# Patient Record
Sex: MALE | Race: WHITE | Employment: OTHER | URBAN - NONMETROPOLITAN AREA
[De-identification: names, ages, dates, MRNs, and addresses within clinical notes are randomized per-mention and may not be internally consistent; named-entity substitution may affect disease eponyms.]

---

## 2017-02-13 RX ORDER — ERGOCALCIFEROL 1.25 MG/1
CAPSULE ORAL
Qty: 4 CAPSULE | Refills: 5 | Status: SHIPPED | OUTPATIENT
Start: 2017-02-13 | End: 2017-08-29 | Stop reason: SDUPTHER

## 2017-02-20 ENCOUNTER — OFFICE VISIT (OUTPATIENT)
Dept: FAMILY MEDICINE CLINIC | Age: 57
End: 2017-02-20
Payer: MEDICAID

## 2017-02-20 ENCOUNTER — HOSPITAL ENCOUNTER (OUTPATIENT)
Dept: OTHER | Age: 57
Discharge: HOME | End: 2017-02-20
Attending: NURSE PRACTITIONER | Admitting: NURSE PRACTITIONER

## 2017-02-20 VITALS
BODY MASS INDEX: 33.41 KG/M2 | HEIGHT: 69 IN | OXYGEN SATURATION: 97 % | RESPIRATION RATE: 18 BRPM | DIASTOLIC BLOOD PRESSURE: 70 MMHG | HEART RATE: 88 BPM | WEIGHT: 225.6 LBS | SYSTOLIC BLOOD PRESSURE: 126 MMHG

## 2017-02-20 DIAGNOSIS — I10 ESSENTIAL HYPERTENSION: ICD-10-CM

## 2017-02-20 DIAGNOSIS — E78.2 MIXED HYPERLIPIDEMIA: ICD-10-CM

## 2017-02-20 DIAGNOSIS — E78.2 MIXED HYPERLIPIDEMIA: Primary | ICD-10-CM

## 2017-02-20 DIAGNOSIS — G62.9 PERIPHERAL POLYNEUROPATHY: ICD-10-CM

## 2017-02-20 DIAGNOSIS — F41.9 ANXIETY: ICD-10-CM

## 2017-02-20 PROCEDURE — 99214 OFFICE O/P EST MOD 30 MIN: CPT | Performed by: NURSE PRACTITIONER

## 2017-02-20 RX ORDER — LORAZEPAM 0.5 MG/1
0.5 TABLET ORAL EVERY 8 HOURS PRN
Qty: 30 TABLET | Refills: 0 | Status: SHIPPED | OUTPATIENT
Start: 2017-02-20 | End: 2017-03-27 | Stop reason: SDUPTHER

## 2017-02-20 RX ORDER — ROPINIROLE 2 MG/1
2 TABLET, FILM COATED ORAL NIGHTLY
Qty: 30 TABLET | Refills: 5 | Status: SHIPPED | OUTPATIENT
Start: 2017-02-20 | End: 2017-08-29 | Stop reason: SDUPTHER

## 2017-02-20 RX ORDER — LISINOPRIL 10 MG/1
10 TABLET ORAL DAILY
Qty: 30 TABLET | Refills: 5 | Status: SHIPPED | OUTPATIENT
Start: 2017-02-20 | End: 2017-04-24

## 2017-02-20 RX ORDER — CEPHALEXIN 500 MG/1
500 CAPSULE ORAL 3 TIMES DAILY
Qty: 30 CAPSULE | Refills: 0 | Status: CANCELLED | OUTPATIENT
Start: 2017-02-20

## 2017-02-20 RX ORDER — ESCITALOPRAM OXALATE 10 MG/1
10 TABLET ORAL DAILY
Qty: 30 TABLET | Refills: 5 | Status: SHIPPED | OUTPATIENT
Start: 2017-02-20 | End: 2017-08-29 | Stop reason: SDUPTHER

## 2017-02-20 RX ORDER — FLUTICASONE PROPIONATE 50 MCG
2 SPRAY, SUSPENSION (ML) NASAL NIGHTLY PRN
Qty: 1 BOTTLE | Refills: 5 | Status: SHIPPED | OUTPATIENT
Start: 2017-02-20 | End: 2017-08-29 | Stop reason: SDUPTHER

## 2017-02-20 RX ORDER — ASPIRIN 81 MG/1
81 TABLET, CHEWABLE ORAL DAILY
Qty: 30 TABLET | Refills: 11 | COMMUNITY
Start: 2017-02-20 | End: 2018-11-26 | Stop reason: SDUPTHER

## 2017-02-20 RX ORDER — SIMVASTATIN 10 MG
TABLET ORAL
Qty: 30 TABLET | Refills: 5 | Status: SHIPPED | OUTPATIENT
Start: 2017-02-20 | End: 2017-08-29 | Stop reason: SDUPTHER

## 2017-02-20 RX ORDER — LISINOPRIL 10 MG/1
1 TABLET ORAL DAILY
Refills: 0 | COMMUNITY
Start: 2017-01-24 | End: 2017-02-20 | Stop reason: SDUPTHER

## 2017-02-21 LAB
A/G RATIO: 1.7 (ref 0.8–2)
ALBUMIN SERPL-MCNC: 4.3 G/DL (ref 3.4–4.8)
ALP BLD-CCNC: 118 U/L (ref 25–100)
ALT SERPL-CCNC: 39 U/L (ref 4–36)
ANION GAP SERPL CALCULATED.3IONS-SCNC: 12 MMOL/L (ref 3–16)
AST SERPL-CCNC: 29 U/L (ref 8–33)
BILIRUB SERPL-MCNC: 0.3 MG/DL (ref 0.3–1.2)
BUN BLDV-MCNC: 10 MG/DL (ref 6–20)
CALCIUM SERPL-MCNC: 9.8 MG/DL (ref 8.5–10.5)
CHLORIDE BLD-SCNC: 102 MMOL/L (ref 98–107)
CHOLESTEROL, TOTAL: 168 MG/DL (ref 0–200)
CO2: 29 MMOL/L (ref 20–30)
CREAT SERPL-MCNC: 0.8 MG/DL (ref 0.4–1.2)
GFR AFRICAN AMERICAN: >59
GFR NON-AFRICAN AMERICAN: >59
GLOBULIN: 2.5 G/DL
GLUCOSE BLD-MCNC: 98 MG/DL (ref 74–106)
HDLC SERPL-MCNC: 56 MG/DL (ref 40–60)
LDL CHOLESTEROL CALCULATED: 86 MG/DL
POTASSIUM SERPL-SCNC: 5.1 MMOL/L (ref 3.4–5.1)
SODIUM BLD-SCNC: 143 MMOL/L (ref 136–145)
TOTAL PROTEIN: 6.8 G/DL (ref 6.4–8.3)
TRIGL SERPL-MCNC: 130 MG/DL (ref 0–249)
VLDLC SERPL CALC-MCNC: 26 MG/DL

## 2017-02-27 ASSESSMENT — ENCOUNTER SYMPTOMS
RESPIRATORY NEGATIVE: 1
EYES NEGATIVE: 1
HEARTBURN: 1
BACK PAIN: 1

## 2017-03-28 RX ORDER — LORAZEPAM 0.5 MG/1
TABLET ORAL
Qty: 30 TABLET | Refills: 0 | Status: SHIPPED | OUTPATIENT
Start: 2017-03-28 | End: 2017-04-27 | Stop reason: SDUPTHER

## 2017-04-03 ENCOUNTER — TELEPHONE (OUTPATIENT)
Dept: FAMILY MEDICINE CLINIC | Age: 57
End: 2017-04-03

## 2017-04-17 DIAGNOSIS — R19.5 POSITIVE FIT (FECAL IMMUNOCHEMICAL TEST): Primary | ICD-10-CM

## 2017-04-17 DIAGNOSIS — Z12.11 SCREENING FOR COLON CANCER: Primary | ICD-10-CM

## 2017-04-17 LAB
CONTROL: ABNORMAL
HEMOCCULT STL QL: ABNORMAL

## 2017-04-17 PROCEDURE — 82274 ASSAY TEST FOR BLOOD FECAL: CPT | Performed by: NURSE PRACTITIONER

## 2017-04-24 ENCOUNTER — OFFICE VISIT (OUTPATIENT)
Dept: SURGERY | Age: 57
End: 2017-04-24
Payer: MEDICAID

## 2017-04-24 ENCOUNTER — SURG/PROC ORDERS (OUTPATIENT)
Dept: SURGERY | Age: 57
End: 2017-04-24

## 2017-04-24 ENCOUNTER — HOSPITAL ENCOUNTER (OUTPATIENT)
Dept: OTHER | Age: 57
Discharge: HOME | End: 2017-04-24
Attending: SURGERY | Admitting: SURGERY

## 2017-04-24 VITALS
HEIGHT: 69 IN | HEART RATE: 90 BPM | WEIGHT: 230.9 LBS | BODY MASS INDEX: 34.2 KG/M2 | TEMPERATURE: 98.7 F | DIASTOLIC BLOOD PRESSURE: 89 MMHG | SYSTOLIC BLOOD PRESSURE: 154 MMHG

## 2017-04-24 DIAGNOSIS — R19.5 HEME POSITIVE STOOL: Primary | ICD-10-CM

## 2017-04-24 PROCEDURE — 99244 OFF/OP CNSLTJ NEW/EST MOD 40: CPT | Performed by: SURGERY

## 2017-04-24 RX ORDER — SODIUM CHLORIDE 0.9 % (FLUSH) 0.9 %
10 SYRINGE (ML) INJECTION PRN
Status: CANCELLED | OUTPATIENT
Start: 2017-04-24

## 2017-04-24 RX ORDER — SODIUM CHLORIDE 0.9 % (FLUSH) 0.9 %
10 SYRINGE (ML) INJECTION EVERY 12 HOURS SCHEDULED
Status: CANCELLED | OUTPATIENT
Start: 2017-04-24

## 2017-04-24 RX ORDER — SODIUM CHLORIDE, SODIUM LACTATE, POTASSIUM CHLORIDE, CALCIUM CHLORIDE 600; 310; 30; 20 MG/100ML; MG/100ML; MG/100ML; MG/100ML
INJECTION, SOLUTION INTRAVENOUS CONTINUOUS
Status: CANCELLED | OUTPATIENT
Start: 2017-04-24

## 2017-04-24 ASSESSMENT — ENCOUNTER SYMPTOMS
RESPIRATORY NEGATIVE: 1
GASTROINTESTINAL NEGATIVE: 1
EYES NEGATIVE: 1

## 2017-04-27 RX ORDER — LORAZEPAM 0.5 MG/1
TABLET ORAL
Qty: 30 TABLET | Refills: 0 | Status: SHIPPED | OUTPATIENT
Start: 2017-04-27 | End: 2017-05-29 | Stop reason: SDUPTHER

## 2017-05-26 RX ORDER — PANTOPRAZOLE SODIUM 40 MG/1
TABLET, DELAYED RELEASE ORAL
Qty: 30 TABLET | Refills: 5 | Status: SHIPPED | OUTPATIENT
Start: 2017-05-26 | End: 2017-11-24 | Stop reason: SDUPTHER

## 2017-05-30 RX ORDER — PANTOPRAZOLE SODIUM 40 MG/1
TABLET, DELAYED RELEASE ORAL
Qty: 30 TABLET | Refills: 5 | OUTPATIENT
Start: 2017-05-30

## 2017-05-30 RX ORDER — LORAZEPAM 0.5 MG/1
TABLET ORAL
Qty: 30 TABLET | Refills: 0 | Status: SHIPPED | OUTPATIENT
Start: 2017-05-30 | End: 2017-06-29 | Stop reason: SDUPTHER

## 2017-06-29 RX ORDER — LORAZEPAM 0.5 MG/1
TABLET ORAL
Qty: 30 TABLET | Refills: 0 | Status: SHIPPED | OUTPATIENT
Start: 2017-06-29 | End: 2017-08-29 | Stop reason: SDUPTHER

## 2017-08-29 ENCOUNTER — OFFICE VISIT (OUTPATIENT)
Dept: FAMILY MEDICINE CLINIC | Age: 57
End: 2017-08-29
Payer: MEDICAID

## 2017-08-29 ENCOUNTER — HOSPITAL ENCOUNTER (OUTPATIENT)
Dept: OTHER | Age: 57
Discharge: HOME | End: 2017-08-29
Attending: NURSE PRACTITIONER | Admitting: NURSE PRACTITIONER

## 2017-08-29 VITALS
HEIGHT: 69 IN | OXYGEN SATURATION: 98 % | BODY MASS INDEX: 35.58 KG/M2 | SYSTOLIC BLOOD PRESSURE: 100 MMHG | HEART RATE: 83 BPM | DIASTOLIC BLOOD PRESSURE: 70 MMHG | WEIGHT: 240.2 LBS | RESPIRATION RATE: 12 BRPM

## 2017-08-29 DIAGNOSIS — E78.2 MIXED HYPERLIPIDEMIA: ICD-10-CM

## 2017-08-29 DIAGNOSIS — M25.50 POLYARTHRALGIA: ICD-10-CM

## 2017-08-29 DIAGNOSIS — Z23 NEED FOR INFLUENZA VACCINATION: ICD-10-CM

## 2017-08-29 DIAGNOSIS — E78.2 MIXED HYPERLIPIDEMIA: Primary | ICD-10-CM

## 2017-08-29 DIAGNOSIS — R60.9 EDEMA, UNSPECIFIED TYPE: ICD-10-CM

## 2017-08-29 DIAGNOSIS — G62.9 PERIPHERAL POLYNEUROPATHY: ICD-10-CM

## 2017-08-29 LAB
A/G RATIO: 1.8 (ref 0.8–2)
ALBUMIN SERPL-MCNC: 4.4 G/DL (ref 3.4–4.8)
ALP BLD-CCNC: 141 U/L (ref 25–100)
ALT SERPL-CCNC: 48 U/L (ref 4–36)
ANION GAP SERPL CALCULATED.3IONS-SCNC: 10 MMOL/L (ref 3–16)
AST SERPL-CCNC: 27 U/L (ref 8–33)
BILIRUB SERPL-MCNC: 0.4 MG/DL (ref 0.3–1.2)
BUN BLDV-MCNC: 21 MG/DL (ref 6–20)
CALCIUM SERPL-MCNC: 9.7 MG/DL (ref 8.5–10.5)
CHLORIDE BLD-SCNC: 103 MMOL/L (ref 98–107)
CHOLESTEROL, TOTAL: 174 MG/DL (ref 0–200)
CO2: 27 MMOL/L (ref 20–30)
CREAT SERPL-MCNC: 0.9 MG/DL (ref 0.4–1.2)
GFR AFRICAN AMERICAN: >59
GFR NON-AFRICAN AMERICAN: >59
GLOBULIN: 2.4 G/DL
GLUCOSE BLD-MCNC: 106 MG/DL (ref 74–106)
HDLC SERPL-MCNC: 48 MG/DL (ref 40–60)
LDL CHOLESTEROL CALCULATED: 86 MG/DL
POTASSIUM SERPL-SCNC: 5.1 MMOL/L (ref 3.4–5.1)
SODIUM BLD-SCNC: 140 MMOL/L (ref 136–145)
TOTAL PROTEIN: 6.8 G/DL (ref 6.4–8.3)
TRIGL SERPL-MCNC: 201 MG/DL (ref 0–249)
TSH SERPL DL<=0.05 MIU/L-ACNC: 1.35 UIU/ML (ref 0.35–5.5)
VLDLC SERPL CALC-MCNC: 40 MG/DL

## 2017-08-29 PROCEDURE — 99214 OFFICE O/P EST MOD 30 MIN: CPT | Performed by: NURSE PRACTITIONER

## 2017-08-29 PROCEDURE — 90471 IMMUNIZATION ADMIN: CPT | Performed by: NURSE PRACTITIONER

## 2017-08-29 PROCEDURE — 90688 IIV4 VACCINE SPLT 0.5 ML IM: CPT | Performed by: NURSE PRACTITIONER

## 2017-08-29 RX ORDER — ERGOCALCIFEROL 1.25 MG/1
CAPSULE ORAL
Qty: 4 CAPSULE | Refills: 5 | Status: SHIPPED | OUTPATIENT
Start: 2017-08-29 | End: 2017-12-05 | Stop reason: SDUPTHER

## 2017-08-29 RX ORDER — ESCITALOPRAM OXALATE 10 MG/1
10 TABLET ORAL DAILY
Qty: 30 TABLET | Refills: 5 | Status: SHIPPED | OUTPATIENT
Start: 2017-08-29 | End: 2017-12-05 | Stop reason: SDUPTHER

## 2017-08-29 RX ORDER — FLUTICASONE PROPIONATE 50 MCG
2 SPRAY, SUSPENSION (ML) NASAL NIGHTLY PRN
Qty: 1 BOTTLE | Refills: 5 | Status: SHIPPED | OUTPATIENT
Start: 2017-08-29 | End: 2017-12-05 | Stop reason: SDUPTHER

## 2017-08-29 RX ORDER — SIMVASTATIN 10 MG
TABLET ORAL
Qty: 30 TABLET | Refills: 5 | Status: SHIPPED | OUTPATIENT
Start: 2017-08-29 | End: 2017-12-05 | Stop reason: SDUPTHER

## 2017-08-29 RX ORDER — LORAZEPAM 0.5 MG/1
TABLET ORAL
Qty: 30 TABLET | Refills: 0 | Status: SHIPPED | OUTPATIENT
Start: 2017-08-29 | End: 2017-10-30 | Stop reason: SDUPTHER

## 2017-08-29 RX ORDER — ROPINIROLE 2 MG/1
2 TABLET, FILM COATED ORAL NIGHTLY
Qty: 30 TABLET | Refills: 5 | Status: SHIPPED | OUTPATIENT
Start: 2017-08-29 | End: 2017-12-05 | Stop reason: SDUPTHER

## 2017-08-29 ASSESSMENT — PATIENT HEALTH QUESTIONNAIRE - PHQ9
SUM OF ALL RESPONSES TO PHQ QUESTIONS 1-9: 0
2. FEELING DOWN, DEPRESSED OR HOPELESS: 0
1. LITTLE INTEREST OR PLEASURE IN DOING THINGS: 0
SUM OF ALL RESPONSES TO PHQ9 QUESTIONS 1 & 2: 0

## 2017-09-02 ASSESSMENT — ENCOUNTER SYMPTOMS
EYES NEGATIVE: 1
BACK PAIN: 1
RESPIRATORY NEGATIVE: 1
HEARTBURN: 1

## 2017-10-31 RX ORDER — LORAZEPAM 0.5 MG/1
TABLET ORAL
Qty: 30 TABLET | Refills: 0 | Status: SHIPPED | OUTPATIENT
Start: 2017-10-31 | End: 2017-12-05 | Stop reason: SDUPTHER

## 2017-10-31 NOTE — TELEPHONE ENCOUNTER
Refill request received from pharmacy.  Medication pending for approval.     Patient information below:      Hemoglobin A1C (%)   Date Value   07/12/2016 5.7     LDL Calculated (mg/dL)   Date Value   08/29/2017 86     AST (U/L)   Date Value   08/29/2017 27     ALT (U/L)   Date Value   08/29/2017 48 (H)     BUN (mg/dL)   Date Value   08/29/2017 21 (H)      (goal A1C is < 7)   (goal LDL is <100) need 30-50% reduction from baseline     BP Readings from Last 3 Encounters:   08/29/17 100/70   05/03/17 121/86   04/24/17 (!) 154/89    (goal /80)      All Future Testing planned in [facility]:  Lab Frequency Next Occurrence   Protime         Last Office Visit With PCP:  8/29/2017      Next Visit Date:  Future Appointments  Date Time Provider Octavio Camargo   11/27/2017 8:45 AM VITO Tate [address] [place] [address]            Patient Active Problem List:     DVT (deep venous thrombosis) ([address]     Generalized OA     DDD (degenerative disc disease)     Dyspepsia     Incisional hernia     Peripheral neuropathy ([address]     Hyperkalemia     Hyperlipidemia     Polyarthralgia     Edema     Vitamin D deficiency     B12 deficiency     Colon cancer screening     Internal hemorrhoid     Diverticular disease of large intestine without perforation or abscess

## 2017-11-27 RX ORDER — PANTOPRAZOLE SODIUM 40 MG/1
TABLET, DELAYED RELEASE ORAL
Qty: 30 TABLET | Refills: 5 | Status: SHIPPED | OUTPATIENT
Start: 2017-11-27 | End: 2018-05-07 | Stop reason: SDUPTHER

## 2017-12-05 ENCOUNTER — OFFICE VISIT (OUTPATIENT)
Dept: FAMILY MEDICINE CLINIC | Age: 57
End: 2017-12-05
Payer: MEDICAID

## 2017-12-05 ENCOUNTER — HOSPITAL ENCOUNTER (OUTPATIENT)
Dept: OTHER | Age: 57
Discharge: HOME | End: 2017-12-05
Attending: NURSE PRACTITIONER | Admitting: NURSE PRACTITIONER

## 2017-12-05 VITALS
RESPIRATION RATE: 18 BRPM | DIASTOLIC BLOOD PRESSURE: 70 MMHG | HEART RATE: 88 BPM | WEIGHT: 244 LBS | BODY MASS INDEX: 36.14 KG/M2 | SYSTOLIC BLOOD PRESSURE: 120 MMHG | HEIGHT: 69 IN | OXYGEN SATURATION: 98 %

## 2017-12-05 DIAGNOSIS — E78.2 MIXED HYPERLIPIDEMIA: ICD-10-CM

## 2017-12-05 DIAGNOSIS — G89.29 CHRONIC BILATERAL LOW BACK PAIN WITH BILATERAL SCIATICA: ICD-10-CM

## 2017-12-05 DIAGNOSIS — M54.42 CHRONIC BILATERAL LOW BACK PAIN WITH BILATERAL SCIATICA: ICD-10-CM

## 2017-12-05 DIAGNOSIS — R53.83 FATIGUE, UNSPECIFIED TYPE: ICD-10-CM

## 2017-12-05 DIAGNOSIS — E78.2 MIXED HYPERLIPIDEMIA: Primary | ICD-10-CM

## 2017-12-05 DIAGNOSIS — M54.41 CHRONIC BILATERAL LOW BACK PAIN WITH BILATERAL SCIATICA: ICD-10-CM

## 2017-12-05 LAB
A/G RATIO: 1.7 (ref 0.8–2)
ALBUMIN SERPL-MCNC: 4.1 G/DL (ref 3.4–4.8)
ALP BLD-CCNC: 90 U/L (ref 25–100)
ALT SERPL-CCNC: 38 U/L (ref 4–36)
ANION GAP SERPL CALCULATED.3IONS-SCNC: 12 MMOL/L (ref 3–16)
AST SERPL-CCNC: 23 U/L (ref 8–33)
BASOPHILS ABSOLUTE: 0 K/UL (ref 0–0.1)
BASOPHILS RELATIVE PERCENT: 0.6 %
BILIRUB SERPL-MCNC: 0.4 MG/DL (ref 0.3–1.2)
BUN BLDV-MCNC: 19 MG/DL (ref 6–20)
CALCIUM SERPL-MCNC: 9.6 MG/DL (ref 8.5–10.5)
CHLORIDE BLD-SCNC: 99 MMOL/L (ref 98–107)
CHOLESTEROL, TOTAL: 192 MG/DL (ref 0–200)
CO2: 28 MMOL/L (ref 20–30)
CREAT SERPL-MCNC: 0.9 MG/DL (ref 0.4–1.2)
EOSINOPHILS ABSOLUTE: 0.2 K/UL (ref 0–0.4)
EOSINOPHILS RELATIVE PERCENT: 2.8 %
GFR AFRICAN AMERICAN: >59
GFR NON-AFRICAN AMERICAN: >59
GLOBULIN: 2.4 G/DL
GLUCOSE BLD-MCNC: 107 MG/DL (ref 74–106)
HCT VFR BLD CALC: 45.8 % (ref 40–54)
HDLC SERPL-MCNC: 40 MG/DL (ref 40–60)
HEMOGLOBIN: 14.6 G/DL (ref 13–18)
LDL CHOLESTEROL CALCULATED: 103 MG/DL
LYMPHOCYTES ABSOLUTE: 2.5 K/UL (ref 1.5–4)
LYMPHOCYTES RELATIVE PERCENT: 39.3 %
MCH RBC QN AUTO: 31.3 PG (ref 27–32)
MCHC RBC AUTO-ENTMCNC: 31.9 G/DL (ref 31–35)
MCV RBC AUTO: 98.1 FL (ref 80–100)
MONOCYTES ABSOLUTE: 0.8 K/UL (ref 0.2–0.8)
MONOCYTES RELATIVE PERCENT: 12.3 %
NEUTROPHILS ABSOLUTE: 2.8 K/UL (ref 2–7.5)
NEUTROPHILS RELATIVE PERCENT: 44.8 %
PDW BLD-RTO: 14.6 % (ref 11–16)
PLATELET # BLD: 402 K/UL (ref 150–400)
PMV BLD AUTO: 10.7 FL (ref 6–10)
POTASSIUM SERPL-SCNC: 4.7 MMOL/L (ref 3.4–5.1)
RBC # BLD: 4.67 M/UL (ref 4.5–6)
SODIUM BLD-SCNC: 139 MMOL/L (ref 136–145)
TOTAL PROTEIN: 6.5 G/DL (ref 6.4–8.3)
TRIGL SERPL-MCNC: 243 MG/DL (ref 0–249)
VLDLC SERPL CALC-MCNC: 49 MG/DL
WBC # BLD: 6.3 K/UL (ref 4–11)

## 2017-12-05 PROCEDURE — G8484 FLU IMMUNIZE NO ADMIN: HCPCS | Performed by: NURSE PRACTITIONER

## 2017-12-05 PROCEDURE — 1036F TOBACCO NON-USER: CPT | Performed by: NURSE PRACTITIONER

## 2017-12-05 PROCEDURE — 99214 OFFICE O/P EST MOD 30 MIN: CPT | Performed by: NURSE PRACTITIONER

## 2017-12-05 PROCEDURE — 3017F COLORECTAL CA SCREEN DOC REV: CPT | Performed by: NURSE PRACTITIONER

## 2017-12-05 PROCEDURE — G8417 CALC BMI ABV UP PARAM F/U: HCPCS | Performed by: NURSE PRACTITIONER

## 2017-12-05 PROCEDURE — G8427 DOCREV CUR MEDS BY ELIG CLIN: HCPCS | Performed by: NURSE PRACTITIONER

## 2017-12-05 RX ORDER — BUMETANIDE 2 MG/1
2 TABLET ORAL DAILY
Qty: 30 TABLET | Refills: 2 | Status: SHIPPED | OUTPATIENT
Start: 2017-12-05 | End: 2018-03-08 | Stop reason: SDUPTHER

## 2017-12-05 RX ORDER — BUMETANIDE 2 MG/1
2 TABLET ORAL 2 TIMES DAILY
Qty: 30 TABLET | Refills: 1 | Status: SHIPPED | OUTPATIENT
Start: 2017-12-05 | End: 2017-12-05 | Stop reason: CLARIF

## 2017-12-05 RX ORDER — FLUTICASONE PROPIONATE 50 MCG
2 SPRAY, SUSPENSION (ML) NASAL NIGHTLY PRN
Qty: 1 BOTTLE | Refills: 5 | Status: SHIPPED | OUTPATIENT
Start: 2017-12-05 | End: 2018-05-07 | Stop reason: SDUPTHER

## 2017-12-05 RX ORDER — ROPINIROLE 2 MG/1
2 TABLET, FILM COATED ORAL NIGHTLY
Qty: 30 TABLET | Refills: 5 | Status: SHIPPED | OUTPATIENT
Start: 2017-12-05 | End: 2018-05-07 | Stop reason: SDUPTHER

## 2017-12-05 RX ORDER — LISINOPRIL 10 MG/1
10 TABLET ORAL DAILY
Refills: 0 | COMMUNITY
Start: 2017-11-10 | End: 2018-01-09

## 2017-12-05 RX ORDER — ERGOCALCIFEROL 1.25 MG/1
CAPSULE ORAL
Qty: 4 CAPSULE | Refills: 5 | Status: SHIPPED | OUTPATIENT
Start: 2017-12-05 | End: 2018-05-07 | Stop reason: SDUPTHER

## 2017-12-05 RX ORDER — SIMVASTATIN 10 MG
TABLET ORAL
Qty: 30 TABLET | Refills: 5 | Status: SHIPPED | OUTPATIENT
Start: 2017-12-05 | End: 2018-05-07 | Stop reason: SDUPTHER

## 2017-12-05 RX ORDER — LORAZEPAM 0.5 MG/1
TABLET ORAL
Qty: 30 TABLET | Refills: 0 | Status: SHIPPED | OUTPATIENT
Start: 2017-12-05 | End: 2018-01-27 | Stop reason: SDUPTHER

## 2017-12-05 RX ORDER — MORPHINE SULFATE 15 MG/1
15 TABLET, FILM COATED, EXTENDED RELEASE ORAL 2 TIMES DAILY
Refills: 0 | COMMUNITY
Start: 2017-11-13 | End: 2018-05-07 | Stop reason: DRUGHIGH

## 2017-12-05 RX ORDER — ESCITALOPRAM OXALATE 10 MG/1
10 TABLET ORAL DAILY
Qty: 30 TABLET | Refills: 5 | Status: SHIPPED | OUTPATIENT
Start: 2017-12-05 | End: 2018-05-07 | Stop reason: SDUPTHER

## 2017-12-05 ASSESSMENT — ENCOUNTER SYMPTOMS
RESPIRATORY NEGATIVE: 1
BACK PAIN: 1
EYES NEGATIVE: 1
HEARTBURN: 1

## 2017-12-05 NOTE — PATIENT INSTRUCTIONS
We are committed to providing you with the best care possible. In order to help us achieve these goals please remember to bring all medications, herbal products, and over the counter supplements with you to each visit. If your provider has ordered testing for you, please be sure to follow up with our office if you have not received results within 7 days after the testing took place. *If you receive a survey after visiting one of our offices, please take time to share your experience concerning your physician office visit. These surveys are confidential and no health information about you is shared. We are eager to improve for you and we are counting on your feedback to help make that happen. · Keep a list of your medicines with you. List all of the prescription medicines, nonprescription medicines, supplements, natural remedies, and vitamins that you take. Tell your healthcare providers who treat you about all of the products you are taking. Your provider can provide you with a form to keep track of them. Just ask. · Follow the directions that come with your medicine, including information about food or alcohol. Make sure you know how and when to take your medicine. Do not take more or less than you are supposed to take. · Keep all medicines out of the reach of children. · Store medicines according to the directions on the label. · Monitor yourself. Learn to know how your body reacts to your new medicine and keep track of how it makes you feel before attempting (If your provider has allowed you to do so) to drive or go to work. · Seek emergency medical attention if you think you have used too much of this medicine. An overdose of any prescription medicine can be fatal. Overdose symptoms may include extreme drowsiness, muscle weakness, confusion, cold and clammy skin, pinpoint pupils, shallow breathing, slow heart rate, fainting, or coma.   · Don't share prescription medicines with others, even when they seem to have the same symptoms. What may be good for you may be harmful to others. · If you are no longer taking a prescribed medication and you have pills left please take your pills out of their original containers. Mix crushed pills with an undesirable substance, such as cat litter or used coffee grounds. Put the mixture into a disposable container with a lid, such as an empty margarine tub, or into a sealable bag. Cover up or remove any of your personal information on the empty containers by covering it with black permanent marker or duct tape. Place the sealed container with the mixture, and the empty drug containers, in the trash. · If you use a medication that is in the form of a patch, dispose of used patches by folding them in half so that the sticky sides meet, and then flushing them down a toilet. They should not be placed in the household trash where children or pets can find them. · If you have any questions, ask your provider or pharmacist for more information. · Be sure to keep all appointments for provider visits or tests.    Counseling given: Not Answered

## 2017-12-05 NOTE — PROGRESS NOTES
Have you seen any other physician or provider since your last visit yes - pain clinic    Have you had any other diagnostic tests since your last visit? yes - UDS    Have you changed or stopped any medications since your last visit?  yes - morphine adjustment

## 2017-12-05 NOTE — PROGRESS NOTES
SUBJECTIVE:  Loida Means is a 56 y.o. male that presents with   Chief Complaint   Patient presents with    Hyperlipidemia    Hypertension   . HPI:    Hyperlipidemia   This is a chronic problem. The current episode started more than 1 year ago. The problem is controlled. Recent lipid tests were reviewed and are normal. Factors aggravating his hyperlipidemia include fatty foods. Associated symptoms include leg pain and myalgias. Current antihyperlipidemic treatment includes statins and diet change. The current treatment provides moderate improvement of lipids. Compliance problems include adherence to exercise (very limited for exercise). Risk factors for coronary artery disease include dyslipidemia, family history, male sex, obesity, a sedentary lifestyle and stress. Gastroesophageal Reflux   He complains of early satiety and heartburn. This is a new problem. The current episode started 1 to 4 weeks ago. The problem occurs frequently. The problem has been gradually improving. The heartburn is located in the substernum. The heartburn is of moderate intensity. The heartburn does not wake him from sleep. The heartburn does not limit his activity. The heartburn doesn't change with position. The symptoms are aggravated by certain foods. Associated symptoms include fatigue. Back Pain   This is a chronic (sees pain management) problem. The current episode started more than 1 year ago. The problem occurs constantly. The pain is present in the lumbar spine, sacro-iliac, gluteal and thoracic spine. The quality of the pain is described as aching, burning, cramping and stabbing. The pain radiates to the left foot, left knee and left thigh. The pain is at a severity of 7/10. The pain is moderate. The pain is the same all the time. The symptoms are aggravated by bending, position and twisting. Stiffness is present all day, at night and in the morning. Associated symptoms include leg pain, numbness, paresis and weakness. and tenderness. Cervical back: He exhibits decreased range of motion, tenderness and pain. Thoracic back: He exhibits decreased range of motion and tenderness. Lumbar back: He exhibits decreased range of motion, tenderness and pain. Neurological: He is alert and oriented to person, place, and time. Skin: Skin is warm, dry and intact. Psychiatric: He has a normal mood and affect. His behavior is normal. Judgment and thought content normal.   Nursing note and vitals reviewed. No results found for requested labs within last 30 days. Hemoglobin A1C (%)   Date Value   07/12/2016 5.7     LDL Calculated (mg/dL)   Date Value   08/29/2017 86         Lab Results   Component Value Date    WBC 7.0 10/12/2016    NEUTROABS 3.6 10/12/2016    HGB 14.4 10/12/2016    HCT 44.1 10/12/2016    MCV 92.6 10/12/2016     10/12/2016       Lab Results   Component Value Date    TSH 1.35 08/29/2017         ASSESSMENT/PLAN:  1. Mixed hyperlipidemia  Lipid Panel    Comprehensive Metabolic Panel   2. Fatigue, unspecified type  CBC Auto Differential   3. Chronic bilateral low back pain with bilateral sciatica          Orders Placed This Encounter   Procedures    Lipid Panel     Standing Status:   Future     Number of Occurrences:   1     Standing Expiration Date:   12/5/2018     Order Specific Question:   Is Patient Fasting?/# of Hours     Answer:   6    Comprehensive Metabolic Panel     Standing Status:   Future     Number of Occurrences:   1     Standing Expiration Date:   12/5/2018    CBC Auto Differential     Standing Status:   Future     Number of Occurrences:   1     Standing Expiration Date:   12/5/2018        Outpatient Encounter Prescriptions as of 12/5/2017   Medication Sig Dispense Refill    lisinopril (PRINIVIL;ZESTRIL) 10 MG tablet Take 10 mg by mouth daily  0    morphine (MS CONTIN) 15 MG extended release tablet Take 15 mg by mouth 2 times daily .   0    LORazepam (ATIVAN) 0.5 MG

## 2017-12-14 ENCOUNTER — TELEPHONE (OUTPATIENT)
Dept: FAMILY MEDICINE CLINIC | Age: 57
End: 2017-12-14

## 2017-12-15 RX ORDER — LEVOFLOXACIN 500 MG/1
500 TABLET, FILM COATED ORAL DAILY
Qty: 10 TABLET | Refills: 0 | Status: SHIPPED | OUTPATIENT
Start: 2017-12-15 | End: 2017-12-25

## 2018-01-08 NOTE — TELEPHONE ENCOUNTER
Refill request received from pharmacy.  Medication pending for approval.     Patient information below:      Hemoglobin A1C (%)   Date Value   07/12/2016 5.7     LDL Calculated (mg/dL)   Date Value   12/05/2017 103     AST (U/L)   Date Value   12/05/2017 23     ALT (U/L)   Date Value   12/05/2017 38 (H)     BUN (mg/dL)   Date Value   12/05/2017 19      (goal A1C is < 7)   (goal LDL is <100) need 30-50% reduction from baseline     BP Readings from Last 3 Encounters:   12/05/17 120/70   08/29/17 100/70   05/03/17 121/86    (goal /80)      All Future Testing planned in [facility]:  Lab Frequency Next Occurrence   Protime         Last Office Visit With PCP:  12/14/2017      Next Visit Date:  Future Appointments  Date Time Provider Octavio Camargo   4/5/2018 8:30 AM VITO Daigle [address] [place] [address]            Patient Active Problem List:     DVT (deep venous thrombosis) ([address]     Generalized OA     DDD (degenerative disc disease)     Dyspepsia     Incisional hernia     Peripheral neuropathy ([address]     Hyperkalemia     Hyperlipidemia     Polyarthralgia     Edema     Vitamin D deficiency     B12 deficiency     Colon cancer screening     Internal hemorrhoid     Diverticular disease of large intestine without perforation or abscess

## 2018-01-09 RX ORDER — LISINOPRIL 10 MG/1
TABLET ORAL
Qty: 30 TABLET | Refills: 5 | Status: SHIPPED | OUTPATIENT
Start: 2018-01-09 | End: 2018-07-06 | Stop reason: SDUPTHER

## 2018-01-28 RX ORDER — LORAZEPAM 0.5 MG/1
TABLET ORAL
Qty: 30 TABLET | Refills: 0 | Status: SHIPPED | OUTPATIENT
Start: 2018-01-28 | End: 2018-03-22 | Stop reason: SDUPTHER

## 2018-03-08 RX ORDER — BUMETANIDE 2 MG/1
TABLET ORAL
Qty: 30 TABLET | Refills: 2 | Status: SHIPPED | OUTPATIENT
Start: 2018-03-08 | End: 2018-06-06 | Stop reason: SDUPTHER

## 2018-03-22 RX ORDER — LORAZEPAM 0.5 MG/1
TABLET ORAL
Qty: 30 TABLET | Refills: 0 | Status: SHIPPED | OUTPATIENT
Start: 2018-03-22 | End: 2018-05-09 | Stop reason: SDUPTHER

## 2018-05-07 ENCOUNTER — HOSPITAL ENCOUNTER (OUTPATIENT)
Dept: OTHER | Age: 58
Discharge: HOME | End: 2018-05-07
Attending: NURSE PRACTITIONER | Admitting: NURSE PRACTITIONER

## 2018-05-07 ENCOUNTER — OFFICE VISIT (OUTPATIENT)
Dept: FAMILY MEDICINE CLINIC | Age: 58
End: 2018-05-07
Payer: MEDICAID

## 2018-05-07 VITALS
HEART RATE: 80 BPM | DIASTOLIC BLOOD PRESSURE: 62 MMHG | OXYGEN SATURATION: 97 % | RESPIRATION RATE: 18 BRPM | SYSTOLIC BLOOD PRESSURE: 122 MMHG | HEIGHT: 69 IN | WEIGHT: 241 LBS | BODY MASS INDEX: 35.7 KG/M2

## 2018-05-07 DIAGNOSIS — G25.81 RESTLESS LEGS SYNDROME: ICD-10-CM

## 2018-05-07 DIAGNOSIS — I82.509 CHRONIC DEEP VEIN THROMBOSIS (DVT) OF LOWER EXTREMITY, UNSPECIFIED LATERALITY, UNSPECIFIED VEIN (HCC): ICD-10-CM

## 2018-05-07 DIAGNOSIS — G62.9 PERIPHERAL POLYNEUROPATHY: ICD-10-CM

## 2018-05-07 DIAGNOSIS — R06.83 SNORES: ICD-10-CM

## 2018-05-07 DIAGNOSIS — R60.9 EDEMA, UNSPECIFIED TYPE: ICD-10-CM

## 2018-05-07 DIAGNOSIS — E78.2 MIXED HYPERLIPIDEMIA: ICD-10-CM

## 2018-05-07 DIAGNOSIS — G47.10 HYPERSOMNIA: ICD-10-CM

## 2018-05-07 DIAGNOSIS — E78.2 MIXED HYPERLIPIDEMIA: Primary | ICD-10-CM

## 2018-05-07 DIAGNOSIS — M25.50 POLYARTHRALGIA: ICD-10-CM

## 2018-05-07 PROCEDURE — 3017F COLORECTAL CA SCREEN DOC REV: CPT | Performed by: NURSE PRACTITIONER

## 2018-05-07 PROCEDURE — 99214 OFFICE O/P EST MOD 30 MIN: CPT | Performed by: NURSE PRACTITIONER

## 2018-05-07 PROCEDURE — G8427 DOCREV CUR MEDS BY ELIG CLIN: HCPCS | Performed by: NURSE PRACTITIONER

## 2018-05-07 PROCEDURE — 1036F TOBACCO NON-USER: CPT | Performed by: NURSE PRACTITIONER

## 2018-05-07 PROCEDURE — G8417 CALC BMI ABV UP PARAM F/U: HCPCS | Performed by: NURSE PRACTITIONER

## 2018-05-07 RX ORDER — ROPINIROLE 2 MG/1
2 TABLET, FILM COATED ORAL NIGHTLY
Qty: 30 TABLET | Refills: 5 | Status: SHIPPED | OUTPATIENT
Start: 2018-05-07 | End: 2018-11-26 | Stop reason: SDUPTHER

## 2018-05-07 RX ORDER — PANTOPRAZOLE SODIUM 40 MG/1
TABLET, DELAYED RELEASE ORAL
Qty: 30 TABLET | Refills: 5 | Status: SHIPPED | OUTPATIENT
Start: 2018-05-07 | End: 2018-11-03 | Stop reason: SDUPTHER

## 2018-05-07 RX ORDER — ESCITALOPRAM OXALATE 10 MG/1
10 TABLET ORAL DAILY
Qty: 30 TABLET | Refills: 5 | Status: SHIPPED | OUTPATIENT
Start: 2018-05-07 | End: 2018-11-26 | Stop reason: SDUPTHER

## 2018-05-07 RX ORDER — ERGOCALCIFEROL 1.25 MG/1
CAPSULE ORAL
Qty: 4 CAPSULE | Refills: 5 | Status: SHIPPED | OUTPATIENT
Start: 2018-05-07 | End: 2018-11-26 | Stop reason: SDUPTHER

## 2018-05-07 RX ORDER — MORPHINE SULFATE 15 MG/1
15 TABLET ORAL DAILY
COMMUNITY
End: 2019-07-15 | Stop reason: ALTCHOICE

## 2018-05-07 RX ORDER — SIMVASTATIN 10 MG
TABLET ORAL
Qty: 30 TABLET | Refills: 5 | Status: SHIPPED | OUTPATIENT
Start: 2018-05-07 | End: 2018-05-22

## 2018-05-07 RX ORDER — FLUTICASONE PROPIONATE 50 MCG
2 SPRAY, SUSPENSION (ML) NASAL NIGHTLY PRN
Qty: 1 BOTTLE | Refills: 5 | Status: SHIPPED | OUTPATIENT
Start: 2018-05-07 | End: 2018-11-26 | Stop reason: SDUPTHER

## 2018-05-07 ASSESSMENT — ENCOUNTER SYMPTOMS
EYES NEGATIVE: 1
HEARTBURN: 1
BACK PAIN: 1
RESPIRATORY NEGATIVE: 1

## 2018-05-08 LAB
A/G RATIO: 1.5 (ref 0.8–2)
ALBUMIN SERPL-MCNC: 4 G/DL (ref 3.4–4.8)
ALP BLD-CCNC: 106 U/L (ref 25–100)
ALT SERPL-CCNC: 34 U/L (ref 4–36)
ANION GAP SERPL CALCULATED.3IONS-SCNC: 11 MMOL/L (ref 3–16)
AST SERPL-CCNC: 25 U/L (ref 8–33)
BILIRUB SERPL-MCNC: 0.3 MG/DL (ref 0.3–1.2)
BUN BLDV-MCNC: 21 MG/DL (ref 6–20)
CALCIUM SERPL-MCNC: 9.7 MG/DL (ref 8.5–10.5)
CHLORIDE BLD-SCNC: 101 MMOL/L (ref 98–107)
CHOLESTEROL, TOTAL: 217 MG/DL (ref 0–200)
CO2: 28 MMOL/L (ref 20–30)
CREAT SERPL-MCNC: 1 MG/DL (ref 0.4–1.2)
GFR AFRICAN AMERICAN: >59
GFR NON-AFRICAN AMERICAN: >59
GLOBULIN: 2.7 G/DL
GLUCOSE BLD-MCNC: 94 MG/DL (ref 74–106)
HDLC SERPL-MCNC: 39 MG/DL (ref 40–60)
LDL CHOLESTEROL CALCULATED: 119 MG/DL
POTASSIUM SERPL-SCNC: 4.9 MMOL/L (ref 3.4–5.1)
SODIUM BLD-SCNC: 140 MMOL/L (ref 136–145)
TOTAL PROTEIN: 6.7 G/DL (ref 6.4–8.3)
TRIGL SERPL-MCNC: 295 MG/DL (ref 0–249)
TSH SERPL DL<=0.05 MIU/L-ACNC: 2.39 UIU/ML (ref 0.35–5.5)
VLDLC SERPL CALC-MCNC: 59 MG/DL

## 2018-05-10 ENCOUNTER — TELEPHONE (OUTPATIENT)
Dept: FAMILY MEDICINE CLINIC | Age: 58
End: 2018-05-10

## 2018-05-10 RX ORDER — LORAZEPAM 0.5 MG/1
TABLET ORAL
Qty: 30 TABLET | Refills: 0 | Status: SHIPPED | OUTPATIENT
Start: 2018-05-10 | End: 2018-06-27 | Stop reason: SDUPTHER

## 2018-05-16 RX ORDER — LORAZEPAM 0.5 MG/1
TABLET ORAL
Qty: 30 TABLET | Refills: 0 | OUTPATIENT
Start: 2018-05-16 | End: 2018-06-15

## 2018-05-22 RX ORDER — PRAVASTATIN SODIUM 40 MG
40 TABLET ORAL EVERY EVENING
Qty: 30 TABLET | Refills: 3 | Status: SHIPPED | OUTPATIENT
Start: 2018-05-22 | End: 2018-09-16 | Stop reason: SDUPTHER

## 2018-05-22 RX ORDER — PRAVASTATIN SODIUM 40 MG
40 TABLET ORAL EVERY EVENING
Qty: 30 TABLET | Refills: 3 | Status: SHIPPED | OUTPATIENT
Start: 2018-05-22 | End: 2018-05-22 | Stop reason: SDUPTHER

## 2018-06-06 RX ORDER — BUMETANIDE 2 MG/1
TABLET ORAL
Qty: 30 TABLET | Refills: 2 | Status: SHIPPED | OUTPATIENT
Start: 2018-06-06 | End: 2018-09-04 | Stop reason: SDUPTHER

## 2018-06-22 ENCOUNTER — HOSPITAL ENCOUNTER (OUTPATIENT)
Dept: NEUROLOGY | Age: 58
Discharge: HOME | End: 2018-06-22
Attending: INTERNAL MEDICINE | Admitting: INTERNAL MEDICINE

## 2018-06-27 DIAGNOSIS — F41.1 GAD (GENERALIZED ANXIETY DISORDER): Primary | ICD-10-CM

## 2018-06-28 PROBLEM — F41.1 GAD (GENERALIZED ANXIETY DISORDER): Status: ACTIVE | Noted: 2018-06-28

## 2018-06-28 RX ORDER — LORAZEPAM 0.5 MG/1
TABLET ORAL
Qty: 30 TABLET | Refills: 0 | Status: SHIPPED | OUTPATIENT
Start: 2018-06-28 | End: 2018-09-11 | Stop reason: SDUPTHER

## 2018-07-08 RX ORDER — LISINOPRIL 10 MG/1
TABLET ORAL
Qty: 30 TABLET | Refills: 5 | Status: SHIPPED | OUTPATIENT
Start: 2018-07-08 | End: 2018-11-26 | Stop reason: SDUPTHER

## 2018-07-24 ENCOUNTER — OFFICE VISIT (OUTPATIENT)
Dept: FAMILY MEDICINE CLINIC | Age: 58
End: 2018-07-24
Payer: MEDICAID

## 2018-07-24 VITALS
WEIGHT: 236 LBS | OXYGEN SATURATION: 97 % | DIASTOLIC BLOOD PRESSURE: 62 MMHG | BODY MASS INDEX: 34.96 KG/M2 | SYSTOLIC BLOOD PRESSURE: 128 MMHG | HEIGHT: 69 IN | HEART RATE: 64 BPM

## 2018-07-24 DIAGNOSIS — M54.5 CHRONIC MIDLINE LOW BACK PAIN, WITH SCIATICA PRESENCE UNSPECIFIED: Primary | ICD-10-CM

## 2018-07-24 DIAGNOSIS — G89.29 CHRONIC MIDLINE LOW BACK PAIN, WITH SCIATICA PRESENCE UNSPECIFIED: Primary | ICD-10-CM

## 2018-07-24 PROCEDURE — G8427 DOCREV CUR MEDS BY ELIG CLIN: HCPCS | Performed by: NURSE PRACTITIONER

## 2018-07-24 PROCEDURE — 3017F COLORECTAL CA SCREEN DOC REV: CPT | Performed by: NURSE PRACTITIONER

## 2018-07-24 PROCEDURE — G8417 CALC BMI ABV UP PARAM F/U: HCPCS | Performed by: NURSE PRACTITIONER

## 2018-07-24 PROCEDURE — 99213 OFFICE O/P EST LOW 20 MIN: CPT | Performed by: NURSE PRACTITIONER

## 2018-07-24 PROCEDURE — 1036F TOBACCO NON-USER: CPT | Performed by: NURSE PRACTITIONER

## 2018-07-24 RX ORDER — KETOROLAC TROMETHAMINE 30 MG/ML
60 INJECTION, SOLUTION INTRAMUSCULAR; INTRAVENOUS ONCE
Status: COMPLETED | OUTPATIENT
Start: 2018-07-24 | End: 2018-07-24

## 2018-07-24 RX ORDER — METHYLPREDNISOLONE SODIUM SUCCINATE 125 MG/2ML
125 INJECTION, POWDER, LYOPHILIZED, FOR SOLUTION INTRAMUSCULAR; INTRAVENOUS ONCE
Status: COMPLETED | OUTPATIENT
Start: 2018-07-24 | End: 2018-07-24

## 2018-07-24 RX ADMIN — KETOROLAC TROMETHAMINE 60 MG: 30 INJECTION, SOLUTION INTRAMUSCULAR; INTRAVENOUS at 18:39

## 2018-07-24 RX ADMIN — METHYLPREDNISOLONE SODIUM SUCCINATE 125 MG: 125 INJECTION, POWDER, LYOPHILIZED, FOR SOLUTION INTRAMUSCULAR; INTRAVENOUS at 18:39

## 2018-07-24 NOTE — PROGRESS NOTES
Have you seen any other physician or provider since your last visit yes - Dr. Roula Sorensen    Have you had any other diagnostic tests since your last visit? yes - Sleep Study    Have you changed or stopped any medications since your last visit? no     Per Missy Roll, APRN orders Hu Briones was given 125mg  of Solumedrol IM, in the Right Gluteal. No complications or reactions were noted. Patient tolerated procedure well. Per Missy Osorio, APRN orders Hu Briones was given 60mg  of Toradol IM, in the Left Gluteal. No complications or reactions were noted. Patient tolerated procedure well.

## 2018-07-26 ENCOUNTER — NURSE ONLY (OUTPATIENT)
Dept: FAMILY MEDICINE CLINIC | Age: 58
End: 2018-07-26
Payer: MEDICAID

## 2018-07-26 DIAGNOSIS — M54.5 LOW BACK PAIN, UNSPECIFIED BACK PAIN LATERALITY, UNSPECIFIED CHRONICITY, WITH SCIATICA PRESENCE UNSPECIFIED: Primary | ICD-10-CM

## 2018-07-26 PROCEDURE — 96372 THER/PROPH/DIAG INJ SC/IM: CPT | Performed by: NURSE PRACTITIONER

## 2018-07-26 RX ORDER — KETOROLAC TROMETHAMINE 30 MG/ML
60 INJECTION, SOLUTION INTRAMUSCULAR; INTRAVENOUS ONCE
Status: COMPLETED | OUTPATIENT
Start: 2018-07-26 | End: 2018-07-26

## 2018-07-26 RX ORDER — METHYLPREDNISOLONE SODIUM SUCCINATE 125 MG/2ML
125 INJECTION, POWDER, LYOPHILIZED, FOR SOLUTION INTRAMUSCULAR; INTRAVENOUS ONCE
Status: COMPLETED | OUTPATIENT
Start: 2018-07-26 | End: 2018-07-26

## 2018-07-26 RX ADMIN — METHYLPREDNISOLONE SODIUM SUCCINATE 125 MG: 125 INJECTION, POWDER, LYOPHILIZED, FOR SOLUTION INTRAMUSCULAR; INTRAVENOUS at 16:43

## 2018-07-26 RX ADMIN — KETOROLAC TROMETHAMINE 60 MG: 30 INJECTION, SOLUTION INTRAMUSCULAR; INTRAVENOUS at 16:43

## 2018-07-26 NOTE — PROGRESS NOTES
Administrations This Visit     ketorolac (TORADOL) injection 60 mg     Admin Date  07/26/2018  16:43 Action  Given Dose  60 mg Route  Intramuscular Site  Ventrogluteal Right Administered By  Christina Merchant MA    Ordering Provider:  VITO Ortiz    NDC:  19870-556-73    Lot#:  [id number]    :  [facility]    Patient Supplied?:  No          methylPREDNISolone sodium (SOLU-MEDROL) injection 125 mg     Admin Date  07/26/2018  16:43 Action  Given Dose  125 mg Route  Intravenous Site  Ventrogluteal Left  Administered By  Christina Merchant MA    Ordering Provider:  VITO Ortiz    NDC:  1829-1137-89    Lot#:  [id number]    :  [id number]     Patient Supplied?:  No

## 2018-08-03 ENCOUNTER — OFFICE VISIT (OUTPATIENT)
Dept: FAMILY MEDICINE CLINIC | Age: 58
End: 2018-08-03
Payer: MEDICAID

## 2018-08-03 VITALS
SYSTOLIC BLOOD PRESSURE: 138 MMHG | BODY MASS INDEX: 34.96 KG/M2 | HEIGHT: 69 IN | HEART RATE: 88 BPM | RESPIRATION RATE: 18 BRPM | WEIGHT: 236 LBS | DIASTOLIC BLOOD PRESSURE: 78 MMHG | OXYGEN SATURATION: 97 %

## 2018-08-03 DIAGNOSIS — M25.551 PAIN OF BOTH HIP JOINTS: ICD-10-CM

## 2018-08-03 DIAGNOSIS — M25.552 PAIN OF BOTH HIP JOINTS: ICD-10-CM

## 2018-08-03 DIAGNOSIS — M54.16 LUMBAR BACK PAIN WITH RADICULOPATHY AFFECTING RIGHT LOWER EXTREMITY: Primary | ICD-10-CM

## 2018-08-03 PROCEDURE — 1036F TOBACCO NON-USER: CPT | Performed by: NURSE PRACTITIONER

## 2018-08-03 PROCEDURE — 3017F COLORECTAL CA SCREEN DOC REV: CPT | Performed by: NURSE PRACTITIONER

## 2018-08-03 PROCEDURE — G8427 DOCREV CUR MEDS BY ELIG CLIN: HCPCS | Performed by: NURSE PRACTITIONER

## 2018-08-03 PROCEDURE — 99213 OFFICE O/P EST LOW 20 MIN: CPT | Performed by: NURSE PRACTITIONER

## 2018-08-03 PROCEDURE — G8417 CALC BMI ABV UP PARAM F/U: HCPCS | Performed by: NURSE PRACTITIONER

## 2018-08-03 RX ORDER — METHYLPREDNISOLONE SODIUM SUCCINATE 125 MG/2ML
125 INJECTION, POWDER, LYOPHILIZED, FOR SOLUTION INTRAMUSCULAR; INTRAVENOUS ONCE
Status: COMPLETED | OUTPATIENT
Start: 2018-08-03 | End: 2018-08-03

## 2018-08-03 RX ORDER — KETOROLAC TROMETHAMINE 30 MG/ML
60 INJECTION, SOLUTION INTRAMUSCULAR; INTRAVENOUS ONCE
Status: COMPLETED | OUTPATIENT
Start: 2018-08-03 | End: 2018-08-03

## 2018-08-03 RX ADMIN — KETOROLAC TROMETHAMINE 60 MG: 30 INJECTION, SOLUTION INTRAMUSCULAR; INTRAVENOUS at 11:41

## 2018-08-03 RX ADMIN — METHYLPREDNISOLONE SODIUM SUCCINATE 125 MG: 125 INJECTION, POWDER, LYOPHILIZED, FOR SOLUTION INTRAMUSCULAR; INTRAVENOUS at 11:41

## 2018-08-03 NOTE — PATIENT INSTRUCTIONS
surgery (coronary artery bypass graft, or CABG). Ketorolac may also cause stomach or intestinal bleeding, which can be fatal. These conditions can occur without warning while you are using ketorolac, especially in older adults. You should not take this medicine if you already have bleeding in your stomach or intestines. What is ketorolac? Ketorolac is a nonsteroidal anti-inflammatory drug (NSAID). Ketorolac works by reducing hormones that cause inflammation and pain in the body. Ketorolac is used short-term (5 days or less) to treat moderate to severe pain. Ketorolac may also be used for purposes not listed in this medication guide. What should I discuss with my healthcare provider before taking ketorolac? Ketorolac can increase your risk of fatal heart attack or stroke, especially if you use it long term or take high doses, or if you have heart disease. Even people without heart disease or risk factors could have a stroke or heart attack while taking this medicine. Do not use this medicine just before or after heart bypass surgery (coronary artery bypass graft, or CABG). Ketorolac may also cause stomach or intestinal bleeding, which can be fatal. These conditions can occur without warning while you are using ketorolac, especially in older adults. You should not use ketorolac if you are allergic to it, or if you have:  · active or recent stomach ulcer, stomach bleeding, or intestinal bleeding;  · a bleeding or blood-clotting disorder;  · a closed head injury or bleeding in your brain;  · bleeding from a recent surgery;  · severe kidney disease or dehydration;  · a history of asthma or severe allergic reaction after taking aspirin or an NSAID;  · if you are scheduled to have surgery (especially bypass surgery); or  · if you are in late pregnancy or you are breast-feeding a baby. Some medicines can cause unwanted or dangerous effects when used with ketorolac.  Your doctor may need to change your treatment prevents the release of substances in the body that cause inflammation. Methylprednisolone is used to treat many different inflammatory conditions such as arthritis, lupus, psoriasis, ulcerative colitis, allergic disorders, gland (endocrine) disorders, and conditions that affect the skin, eyes, lungs, stomach, nervous system, or blood cells. Methylprednisolone may also be used for purposes not listed in this medication guide. What should I discuss with my healthcare provider before using methylprednisolone? You should not use methylprednisolone if you are allergic to it, or if you have a fungal infection anywhere in your body. Methylprednisolone injection should not be given to a premature baby. Methylprednisolone can weaken your immune system, making it easier for you to get an infection. Steroids can also worsen an infection you already have, or reactivate an infection you recently had. Tell your doctor about any illness or infection you have had within the past several weeks.   To make sure methylprednisolone is safe for you, tell your doctor if you have:  · heart disease, high blood pressure;  · kidney disease;  · cirrhosis or other liver disease;  · past or present tuberculosis;  · glaucoma or cataracts;  · herpes infection of the eyes;  · seizures, epilepsy or recent head injury;  · stomach ulcers, ulcerative colitis, diverticulitis, or recent intestinal surgery;  · a parasite infection that causes diarrhea (pinworms, or threadworms);  · a thyroid disorder;  · osteoporosis or low bone mineral density (steroid medication can increase your risk of bone loss);  · depression or mental illness;  · a muscle disorder such as myasthenia gravis;  · an electrolyte imbalance (such as low levels of potassium or magnesium in your blood);  · high levels of calcium in the blood related to cancer (also called hypercalcemia of malignancy);  · if you use insulin or oral diabetes medication; or  · if you take aspirin on a daily basis or at high doses. It is not known whether methylprednisolone will harm an unborn baby. Tell your doctor if you are pregnant or plan to become pregnant while using this medication. It is not known whether methylprednisolone passes into breast milk or if it could harm a nursing baby. Tell your doctor if you are breast-feeding a baby. Steroids can affect growth in children. Talk with your doctor if you think your child is not growing at a normal rate while using this medication. Do not give this medicine to a child (especially a baby) without medical advice. How is methylprednisolone given? Follow all directions on your prescription label. Your doctor may occasionally change your dose to make sure you get the best results. Do not use this medicine in larger or smaller amounts or for longer than recommended. Methylprednisolone is injected into a muscle or soft tissue, into a skin lesion, into the space around a joint, or into a vein through an IV. A healthcare provider will give you this injection. If you have major surgery or a severe injury or infection, your methylprednisolone dose needs may change. Make sure any doctor caring for you knows you are using this medicine. Steroid medication can weaken your immune system, making it easier for you to get an infection. Call your doctor if you have any signs of infection (fever, chills, body aches). You should not stop using methylprednisolone suddenly, or you could have unpleasant withdrawal symptoms. Follow your doctor's instructions about tapering your dose. Wear a medical alert tag or carry an ID card stating that you use methylprednisolone. Any medical care provider who treats you should know that you use steroid medication. What happens if I miss a dose? Call your doctor for instructions if you miss a dose of methylprednisolone. What happens if I overdose? Seek emergency medical attention or call the Poison Help line at 1-367.312.9389.   An overdose of methylprednisolone is not expected to produce life threatening symptoms. However, long term use of high steroid doses can lead to symptoms such as thinning skin, easy bruising, changes in the shape or location of body fat (especially in your face, neck, back, and waist), increased acne or facial hair, menstrual problems, impotence, or loss of interest in sex. What should I avoid while using methylprednisolone? Do not receive a \"live\" vaccine while using methylprednisolone. Live vaccines include measles, mumps, rubella (MMR), rotavirus, typhoid, yellow fever, varicella (chickenpox), zoster (shingles), and nasal flu (influenza) vaccine. Avoid being near people who are sick or have infections. Call your doctor for preventive treatment if you are exposed to chicken pox or measles. These conditions can be serious or even fatal in people who are using steroid medication. What are the possible side effects of methylprednisolone? Get emergency medical help if you have signs of an allergic reaction: hives; difficulty breathing; swelling of your face, lips, tongue, or throat. Call your doctor at once if you have:  · blurred vision, tunnel vision, eye pain, or seeing halos around lights;  · shortness of breath (even with mild exertion), swelling, rapid weight gain;  · severe depression, changes in personality, unusual thoughts or behavior;  · new or unusual pain in an arm or leg or in your back;  · bloody or tarry stools, coughing up blood or vomit that looks like coffee grounds;  · a seizure (convulsions); or  · low potassium --leg cramps, constipation, irregular heartbeats, fluttering in your chest, increased thirst or urination, numbness or tingling, muscle weakness or limp feeling. Common side effects may include:  · headache;  · mild muscle pain or weakness; or  · stomach discomfort, bloating. This is not a complete list of side effects and others may occur.  Call your doctor for medical advice about

## 2018-08-03 NOTE — PROGRESS NOTES
Administrations This Visit     ketorolac (TORADOL) injection 60 mg     Admin Date  08/03/2018 Action  Given Dose  60 mg Route  Intramuscular Administered By  Christina Merchant MA          methylPREDNISolone sodium (SOLU-MEDROL) injection 125 mg     Admin Date  08/03/2018 Action  Given Dose  125 mg Route  Intramuscular Administered By  Christina Merchant MA

## 2018-08-04 NOTE — PROGRESS NOTES
SUBJECTIVE:  Rebecca Mckeon is a 56 y.o. y/o male that presents with Back Pain (Lumbar)  . HPI:  Symptoms have been present for 4 days at this level of pain. he describes the pain as sharp, dull, aching, burning, throbbing  in the lumbar region. Inciting injury or history of trauma? No  Pain is relieved by - pain med helps but not as much as it usually does so he wants a shot. Pain is aggravated by - activity  Radiation of the pain? No  Paresthesias of the extremities? No  Saddle anesthesia? No  Bowel or bladder incontinence? No  Treatments tried - using pain med and rest        OBJECTIVE:  /62   Pulse 64   Ht 5' 9\" (1.753 m)   Wt 236 lb (107 kg)   SpO2 97% Comment: room air  BMI 34.85 kg/m²   Physical Examination: General appearance - alert, well appearing, and in no distress  Chest - clear to auscultation, no wheezes, rales or rhonchi, symmetric air entry  Heart - normal rate, regular rhythm, normal S1, S2, no murmurs, rubs, clicks or gallops  Back exam - antalgic gait, limited range of motion, pain with motion noted during exam, sacroiliac joints and sciatic notches nontender,  5/3 strength globally and symmetrically in the LEs, 1+ patellar reflexes b/l  Extremities - peripheral pulses normal, no pedal edema, no clubbing or cyanosis  Skin -  Skin color, texture, turgor normal. No rashes or lesions. Psych - Affect normal, no evidence of depression or anxiety    ASSESSMENT & PLAN  Ab Segovia was seen today for back pain. Diagnoses and all orders for this visit:    Chronic midline low back pain, with sciatica presence unspecified  -     methylPREDNISolone sodium (SOLU-MEDROL) injection 125 mg; Inject 2 mLs into the muscle once  -     ketorolac (TORADOL) injection 60 mg; Inject 2 mLs into the muscle once        Return in about 3 months (around 10/24/2018), or if symptoms worsen or fail to improve.       Ab Segovia received counseling on the following healthy behaviors: medication adherence  Reviewed prior labs and health maintenance. Continue current medications, diet and exercise. Discussed use, benefit, and side effects of prescribed medications. Barriers to medication compliance addressed. Patient given educational materials - see patient instructions. All patient questions answered. Patient voiced understanding.

## 2018-08-06 ENCOUNTER — HOSPITAL ENCOUNTER (OUTPATIENT)
Dept: GENERAL RADIOLOGY | Facility: HOSPITAL | Age: 58
Discharge: HOME | End: 2018-08-06
Payer: MEDICAID

## 2018-08-06 ENCOUNTER — HOSPITAL ENCOUNTER (OUTPATIENT)
Facility: HOSPITAL | Age: 58
Discharge: HOME | End: 2018-08-06
Payer: MEDICAID

## 2018-08-06 DIAGNOSIS — M25.552 PAIN OF BOTH HIP JOINTS: ICD-10-CM

## 2018-08-06 DIAGNOSIS — M25.551 PAIN OF BOTH HIP JOINTS: ICD-10-CM

## 2018-08-06 PROCEDURE — 73522 X-RAY EXAM HIPS BI 3-4 VIEWS: CPT

## 2018-08-07 ENCOUNTER — TELEPHONE (OUTPATIENT)
Dept: FAMILY MEDICINE CLINIC | Age: 58
End: 2018-08-07

## 2018-08-14 ENCOUNTER — HOSPITAL ENCOUNTER (OUTPATIENT)
Dept: MRI IMAGING | Facility: HOSPITAL | Age: 58
Discharge: HOME | End: 2018-08-14
Payer: MEDICAID

## 2018-08-14 DIAGNOSIS — M54.16 LUMBAR BACK PAIN WITH RADICULOPATHY AFFECTING RIGHT LOWER EXTREMITY: ICD-10-CM

## 2018-08-14 PROCEDURE — 72148 MRI LUMBAR SPINE W/O DYE: CPT

## 2018-09-03 DIAGNOSIS — M54.16 SPINAL STENOSIS OF LUMBAR REGION WITH RADICULOPATHY: Primary | ICD-10-CM

## 2018-09-03 DIAGNOSIS — M48.061 SPINAL STENOSIS OF LUMBAR REGION WITH RADICULOPATHY: Primary | ICD-10-CM

## 2018-09-03 DIAGNOSIS — M25.552 BILATERAL HIP PAIN: Primary | ICD-10-CM

## 2018-09-03 DIAGNOSIS — M53.3 PAIN OF BOTH SACROILIAC JOINTS: ICD-10-CM

## 2018-09-03 DIAGNOSIS — M25.551 BILATERAL HIP PAIN: Primary | ICD-10-CM

## 2018-09-04 RX ORDER — BUMETANIDE 2 MG/1
TABLET ORAL
Qty: 30 TABLET | Refills: 2 | Status: SHIPPED | OUTPATIENT
Start: 2018-09-04 | End: 2018-11-26 | Stop reason: SDUPTHER

## 2018-09-04 NOTE — TELEPHONE ENCOUNTER
Refill request received from pharmacy.  Medication pending for approval.     Patient information below:      Hemoglobin A1C (%)   Date Value   07/12/2016 5.7     LDL Calculated (mg/dL)   Date Value   05/07/2018 119     AST (U/L)   Date Value   05/07/2018 25     ALT (U/L)   Date Value   05/07/2018 34     BUN (mg/dL)   Date Value   05/07/2018 21 (H)      (goal A1C is < 7)   (goal LDL is <100) need 30-50% reduction from baseline     BP Readings from Last 3 Encounters:   08/03/18 138/78   07/24/18 128/62   05/07/18 122/62    (goal /80)      All Future Testing planned in [facility]:  Lab Frequency Next Occurrence   Protime         Last Office Visit With PCP:  8/7/2018      Next Visit Date:  Future Appointments  Date Time Provider Octavio Camargo   10/24/2018 9:30 AM VITO Simpson [facility]   11/7/2018 9:00 AM VITO Simpson [address] [place] [address]            Patient Active Problem List:     DVT (deep venous thrombosis) ([facility]     Generalized OA     DDD (degenerative disc disease)     Dyspepsia     Incisional hernia     Peripheral neuropathy     Hyperkalemia     Hyperlipidemia     Polyarthralgia     Edema     Vitamin D deficiency     B12 deficiency     Colon cancer screening     Internal hemorrhoid     Diverticular disease of large intestine without perforation or abscess     SANTINO (generalized anxiety disorder)

## 2018-09-11 DIAGNOSIS — F41.1 GAD (GENERALIZED ANXIETY DISORDER): ICD-10-CM

## 2018-09-11 RX ORDER — LORAZEPAM 0.5 MG/1
TABLET ORAL
Qty: 30 TABLET | Refills: 0 | Status: SHIPPED | OUTPATIENT
Start: 2018-09-11 | End: 2018-11-08 | Stop reason: SDUPTHER

## 2018-09-11 NOTE — TELEPHONE ENCOUNTER
Refill request received from pharmacy.  Medication pending for approval.     Shiraz Whipple scanned for review    Patient information below:      Hemoglobin A1C (%)   Date Value   07/12/2016 5.7     LDL Calculated (mg/dL)   Date Value   05/07/2018 119     AST (U/L)   Date Value   05/07/2018 25     ALT (U/L)   Date Value   05/07/2018 34     BUN (mg/dL)   Date Value   05/07/2018 21 (H)      (goal A1C is < 7)   (goal LDL is <100) need 30-50% reduction from baseline     BP Readings from Last 3 Encounters:   08/03/18 138/78   07/24/18 128/62   05/07/18 122/62    (goal /80)      All Future Testing planned in [facility]:  Lab Frequency Next Occurrence   Protime         Last Office Visit With PCP:  9/4/2018      Next Visit Date:  Future Appointments  Date Time Provider Octavio Camargo   10/24/2018 9:30 AM VITO Eduardo [facility]   11/7/2018 9:00 AM VITO Eduardo [address] [place] [address]            Patient Active Problem List:     DVT (deep venous thrombosis) ([facility] [address]     Generalized OA     DDD (degenerative disc disease)     Dyspepsia     Incisional hernia     Peripheral neuropathy     Hyperkalemia     Hyperlipidemia     Polyarthralgia     Edema     Vitamin D deficiency     B12 deficiency     Colon cancer screening     Internal hemorrhoid     Diverticular disease of large intestine without perforation or abscess     SANTINO (generalized anxiety disorder)

## 2018-09-17 ENCOUNTER — TELEPHONE (OUTPATIENT)
Dept: FAMILY MEDICINE CLINIC | Age: 58
End: 2018-09-17

## 2018-09-17 RX ORDER — PRAVASTATIN SODIUM 40 MG
40 TABLET ORAL EVERY EVENING
Qty: 30 TABLET | Refills: 3 | Status: SHIPPED | OUTPATIENT
Start: 2018-09-17 | End: 2018-11-26 | Stop reason: SDUPTHER

## 2018-09-17 NOTE — TELEPHONE ENCOUNTER
Patient's referral, along with all pertinent medical records faxed to the attention of [facility] on 09/17/18, they will contact the patient and our office with appointment date/time/location.

## 2018-09-18 ENCOUNTER — HOSPITAL ENCOUNTER (OUTPATIENT)
Facility: HOSPITAL | Age: 58
Discharge: HOME | End: 2018-09-18
Payer: MEDICAID

## 2018-09-18 ENCOUNTER — TELEPHONE (OUTPATIENT)
Dept: FAMILY MEDICINE CLINIC | Age: 58
End: 2018-09-18

## 2018-09-18 ENCOUNTER — NURSE ONLY (OUTPATIENT)
Dept: FAMILY MEDICINE CLINIC | Age: 58
End: 2018-09-18
Payer: MEDICAID

## 2018-09-18 DIAGNOSIS — Z23 NEED FOR INFLUENZA VACCINATION: Primary | ICD-10-CM

## 2018-09-18 LAB
ANION GAP SERPL CALCULATED.3IONS-SCNC: 11 MMOL/L (ref 3–16)
BUN BLDV-MCNC: 14 MG/DL (ref 6–20)
CALCIUM SERPL-MCNC: 10 MG/DL (ref 8.5–10.5)
CHLORIDE BLD-SCNC: 104 MMOL/L (ref 98–107)
CO2: 25 MMOL/L (ref 20–30)
CREAT SERPL-MCNC: 0.9 MG/DL (ref 0.4–1.2)
GFR AFRICAN AMERICAN: >59
GFR NON-AFRICAN AMERICAN: >59
GLUCOSE BLD-MCNC: 124 MG/DL (ref 74–106)
POTASSIUM SERPL-SCNC: 5.2 MMOL/L (ref 3.4–5.1)
SODIUM BLD-SCNC: 140 MMOL/L (ref 136–145)

## 2018-09-18 PROCEDURE — 36415 COLL VENOUS BLD VENIPUNCTURE: CPT

## 2018-09-18 PROCEDURE — 80048 BASIC METABOLIC PNL TOTAL CA: CPT

## 2018-09-18 PROCEDURE — 90688 IIV4 VACCINE SPLT 0.5 ML IM: CPT | Performed by: NURSE PRACTITIONER

## 2018-09-18 PROCEDURE — 90471 IMMUNIZATION ADMIN: CPT | Performed by: NURSE PRACTITIONER

## 2018-09-18 NOTE — PROGRESS NOTES
Immunizations     Name Date Dose Route    Influenza, Yovany Manley, 3 Years and older, IM (Fluzone 3 yrs and older or Afluria 5 yrs and older) 9/18/2018 0.5 mL Intramuscular    Site: Deltoid- Right    Lot: Hubert Specter: [id number]

## 2018-09-27 NOTE — TELEPHONE ENCOUNTER
Patient scheduled to see [facility] on 10/15/18 at 8:15. Patient's referral, along with all pertinent medical records faxed to the attention of scheduling on 09/17/18. Patient contacted advised of appointment date/time/location. Patient verbalized understanding of all instructions.  (Per Ortho office)

## 2018-10-03 ENCOUNTER — OFFICE VISIT (OUTPATIENT)
Dept: NEUROSURGERY | Facility: CLINIC | Age: 58
End: 2018-10-03

## 2018-10-03 VITALS — BODY MASS INDEX: 34.36 KG/M2 | HEIGHT: 69 IN | WEIGHT: 232 LBS | TEMPERATURE: 98.1 F

## 2018-10-03 DIAGNOSIS — M51.36 DEGENERATIVE DISC DISEASE, LUMBAR: ICD-10-CM

## 2018-10-03 DIAGNOSIS — M54.9 MECHANICAL BACK PAIN: Primary | ICD-10-CM

## 2018-10-03 DIAGNOSIS — M47.816 FACET ARTHRITIS OF LUMBAR REGION: ICD-10-CM

## 2018-10-03 PROCEDURE — 99243 OFF/OP CNSLTJ NEW/EST LOW 30: CPT | Performed by: NEUROLOGICAL SURGERY

## 2018-10-03 RX ORDER — LISINOPRIL 10 MG/1
TABLET ORAL
COMMUNITY
Start: 2018-07-08

## 2018-10-03 RX ORDER — MORPHINE SULFATE 15 MG/1
15 TABLET ORAL
COMMUNITY

## 2018-10-03 RX ORDER — ASPIRIN 81 MG/1
81 TABLET, CHEWABLE ORAL
COMMUNITY
Start: 2017-02-20

## 2018-10-03 RX ORDER — ROPINIROLE 2 MG/1
TABLET, FILM COATED ORAL
Refills: 0 | COMMUNITY
Start: 2018-09-17

## 2018-10-03 RX ORDER — BUMETANIDE 2 MG/1
2 TABLET ORAL DAILY
Refills: 0 | COMMUNITY
Start: 2018-09-04

## 2018-10-03 RX ORDER — OXYCODONE HYDROCHLORIDE 10 MG/1
TABLET ORAL
Refills: 0 | COMMUNITY
Start: 2018-09-09

## 2018-10-03 RX ORDER — PANTOPRAZOLE SODIUM 40 MG/1
TABLET, DELAYED RELEASE ORAL
Refills: 0 | COMMUNITY
Start: 2018-09-11

## 2018-10-03 RX ORDER — ESCITALOPRAM OXALATE 10 MG/1
10 TABLET ORAL
COMMUNITY
Start: 2018-05-07

## 2018-10-03 RX ORDER — LORAZEPAM 0.5 MG/1
0.5 TABLET ORAL EVERY 8 HOURS PRN
Refills: 0 | COMMUNITY
Start: 2018-09-11

## 2018-10-03 NOTE — PROGRESS NOTES
Patient: Edgar Mendez  : 1960    Primary Care Provider: Tonia Gaona APRN    Requesting Provider: As above        History    Chief Complaint: Low back pain.    History of Present Illness: Mr. Mendez is a 58-year-old disabled former who describes a 6-7 month history of low back pain.  He denies any inciting or precipitating events.  He's not undergone any formal treatment.  He is worse with bending and walking.  Sometimes he gets some numbness in his left leg.  Occasionally he has some bowel incontinence.  He is not really better with sitting or lying down.    Review of Systems   Constitutional: Negative for activity change, appetite change, chills, diaphoresis, fatigue, fever and unexpected weight change.   HENT: Negative for congestion, dental problem, drooling, ear discharge, ear pain, facial swelling, hearing loss, mouth sores, nosebleeds, postnasal drip, rhinorrhea, sinus pressure, sneezing, sore throat, tinnitus, trouble swallowing and voice change.    Eyes: Negative for photophobia, pain, discharge, redness, itching and visual disturbance.   Respiratory: Positive for apnea. Negative for cough, choking, chest tightness, shortness of breath, wheezing and stridor.    Cardiovascular: Positive for leg swelling. Negative for chest pain and palpitations.   Gastrointestinal: Negative for abdominal distention, abdominal pain, anal bleeding, blood in stool, constipation, diarrhea, nausea, rectal pain and vomiting.   Endocrine: Negative for cold intolerance, heat intolerance, polydipsia, polyphagia and polyuria.   Genitourinary: Negative for decreased urine volume, difficulty urinating, dysuria, enuresis, flank pain, frequency, genital sores, hematuria and urgency.   Musculoskeletal: Positive for arthralgias, back pain, myalgias and neck pain. Negative for gait problem, joint swelling and neck stiffness.   Skin: Negative for color change, pallor, rash and wound.   Allergic/Immunologic: Negative for  "environmental allergies, food allergies and immunocompromised state.   Neurological: Negative for dizziness, tremors, seizures, syncope, facial asymmetry, speech difficulty, weakness, light-headedness, numbness and headaches.   Hematological: Negative for adenopathy. Does not bruise/bleed easily.   Psychiatric/Behavioral: Positive for dysphoric mood. Negative for agitation, behavioral problems, confusion, decreased concentration, hallucinations, self-injury, sleep disturbance and suicidal ideas. The patient is nervous/anxious. The patient is not hyperactive.        The patient's past medical history, past surgical history, family history, and social history have been reviewed at length in the electronic medical record.    Physical Exam:   Temp 98.1 °F (36.7 °C) (Temporal Artery )   Ht 175.3 cm (69\")   Wt 105 kg (232 lb)   BMI 34.26 kg/m²   CONSTITUTIONAL: Patient is well-nourished, pleasant and appears stated age.  CV: Heart regular rate and rhythm without murmur, rub, or gallop.  PULMONARY: Lungs are clear to ascultation.  MUSCULOSKELETAL:  Straight leg raising is negative.  Dionisio's Sign is negative.  ROM in back is limited in all directions.  Tenderness in the back to palpation is not observed.  NEUROLOGICAL:  Orientation, memory, attention span, language function, and cognition have been examined and are intact.  Strength is intact in the lower extremities to direct testing.  Muscle tone is normal throughout.  Station and gait are normal.  Sensation is intact to light touch testing throughout.  Deep tendon reflexes are 1+ and symmetrical.  Coordination is intact.      Medical Decision Making    Data Review:   MRI of the lumbar spine dated 8/14/18 demonstrates multilevel degenerative disc disease and diffuse facet arthropathy.  There is some mild bilateral recess narrowing at L3-4 and L4-5.  High-grade canal stenosis is not noted.    Diagnosis:   The patient harbors mechanical back pain due to degenerative " disc and degenerative joint disease.    Treatment Options:   There is no role for surgical intervention in this setting.  I'm going to refer the patient to physical therapy to try and lessen his symptoms.  He'll follow-up on an as-needed basis.       Diagnosis Plan   1. Mechanical back pain  Ambulatory Referral to Physical Therapy Evaluate and treat   2. Degenerative disc disease, lumbar  Ambulatory Referral to Physical Therapy Evaluate and treat   3. Facet arthritis of lumbar region ([facility])         Scribed for Luis Guevara MD by Kayleigh Pierce CMA on 10/03/2018 at 2:56 PM      I, Dr. Guevara, personally performed the services described in the documentation, as scribed in my presence, and it is both accurate and complete.

## 2018-10-14 NOTE — TELEPHONE ENCOUNTER
Patient scheduled for MRI Lumbar Spine WO at [facility] on 08/14/18 at 10:15. Patient needs to arrive at 9:45. Patient's order is in epic. Patient notified of appointment date/time/location and any special instructions involved with procedure. Patient verbalized understanding of all instructions.
14-Oct-2018 22:53

## 2018-11-04 RX ORDER — PANTOPRAZOLE SODIUM 40 MG/1
TABLET, DELAYED RELEASE ORAL
Qty: 30 TABLET | Refills: 5 | Status: SHIPPED | OUTPATIENT
Start: 2018-11-04 | End: 2018-11-26 | Stop reason: SDUPTHER

## 2018-11-26 ENCOUNTER — HOSPITAL ENCOUNTER (OUTPATIENT)
Facility: HOSPITAL | Age: 58
Discharge: HOME | End: 2018-11-26
Payer: MEDICAID

## 2018-11-26 ENCOUNTER — OFFICE VISIT (OUTPATIENT)
Dept: FAMILY MEDICINE CLINIC | Age: 58
End: 2018-11-26
Payer: MEDICAID

## 2018-11-26 VITALS
OXYGEN SATURATION: 98 % | WEIGHT: 234.1 LBS | HEIGHT: 69 IN | SYSTOLIC BLOOD PRESSURE: 148 MMHG | HEART RATE: 79 BPM | RESPIRATION RATE: 18 BRPM | DIASTOLIC BLOOD PRESSURE: 86 MMHG | BODY MASS INDEX: 34.67 KG/M2

## 2018-11-26 DIAGNOSIS — M15.9 GENERALIZED OA: ICD-10-CM

## 2018-11-26 DIAGNOSIS — E78.2 MIXED HYPERLIPIDEMIA: ICD-10-CM

## 2018-11-26 DIAGNOSIS — G62.9 PERIPHERAL POLYNEUROPATHY: ICD-10-CM

## 2018-11-26 DIAGNOSIS — E87.5 HYPERKALEMIA: ICD-10-CM

## 2018-11-26 DIAGNOSIS — F41.1 GAD (GENERALIZED ANXIETY DISORDER): ICD-10-CM

## 2018-11-26 DIAGNOSIS — E78.2 MIXED HYPERLIPIDEMIA: Primary | ICD-10-CM

## 2018-11-26 LAB
A/G RATIO: 1.8 (ref 0.8–2)
ALBUMIN SERPL-MCNC: 4.2 G/DL (ref 3.4–4.8)
ALP BLD-CCNC: 107 U/L (ref 25–100)
ALT SERPL-CCNC: 29 U/L (ref 4–36)
ANION GAP SERPL CALCULATED.3IONS-SCNC: 10 MMOL/L (ref 3–16)
AST SERPL-CCNC: 22 U/L (ref 8–33)
BILIRUB SERPL-MCNC: 0.3 MG/DL (ref 0.3–1.2)
BUN BLDV-MCNC: 13 MG/DL (ref 6–20)
CALCIUM SERPL-MCNC: 9.9 MG/DL (ref 8.5–10.5)
CHLORIDE BLD-SCNC: 104 MMOL/L (ref 98–107)
CHOLESTEROL, TOTAL: 148 MG/DL (ref 0–200)
CO2: 27 MMOL/L (ref 20–30)
CREAT SERPL-MCNC: 0.9 MG/DL (ref 0.4–1.2)
GFR AFRICAN AMERICAN: >59
GFR NON-AFRICAN AMERICAN: >59
GLOBULIN: 2.4 G/DL
GLUCOSE BLD-MCNC: 106 MG/DL (ref 74–106)
HDLC SERPL-MCNC: 42 MG/DL (ref 40–60)
LDL CHOLESTEROL CALCULATED: 81 MG/DL
POTASSIUM SERPL-SCNC: 5.6 MMOL/L (ref 3.4–5.1)
SODIUM BLD-SCNC: 141 MMOL/L (ref 136–145)
TOTAL PROTEIN: 6.6 G/DL (ref 6.4–8.3)
TRIGL SERPL-MCNC: 124 MG/DL (ref 0–249)
VLDLC SERPL CALC-MCNC: 25 MG/DL

## 2018-11-26 PROCEDURE — 1036F TOBACCO NON-USER: CPT | Performed by: NURSE PRACTITIONER

## 2018-11-26 PROCEDURE — 80053 COMPREHEN METABOLIC PANEL: CPT

## 2018-11-26 PROCEDURE — G8482 FLU IMMUNIZE ORDER/ADMIN: HCPCS | Performed by: NURSE PRACTITIONER

## 2018-11-26 PROCEDURE — 3017F COLORECTAL CA SCREEN DOC REV: CPT | Performed by: NURSE PRACTITIONER

## 2018-11-26 PROCEDURE — G8417 CALC BMI ABV UP PARAM F/U: HCPCS | Performed by: NURSE PRACTITIONER

## 2018-11-26 PROCEDURE — 36415 COLL VENOUS BLD VENIPUNCTURE: CPT

## 2018-11-26 PROCEDURE — 99214 OFFICE O/P EST MOD 30 MIN: CPT | Performed by: NURSE PRACTITIONER

## 2018-11-26 PROCEDURE — G8427 DOCREV CUR MEDS BY ELIG CLIN: HCPCS | Performed by: NURSE PRACTITIONER

## 2018-11-26 PROCEDURE — 80061 LIPID PANEL: CPT

## 2018-11-26 RX ORDER — ESCITALOPRAM OXALATE 10 MG/1
10 TABLET ORAL DAILY
Qty: 30 TABLET | Refills: 5 | Status: SHIPPED | OUTPATIENT
Start: 2018-11-26 | End: 2019-03-20 | Stop reason: SDUPTHER

## 2018-11-26 RX ORDER — ERGOCALCIFEROL 1.25 MG/1
CAPSULE ORAL
Qty: 4 CAPSULE | Refills: 5 | Status: SHIPPED | OUTPATIENT
Start: 2018-11-26 | End: 2019-03-20 | Stop reason: SDUPTHER

## 2018-11-26 RX ORDER — LORAZEPAM 0.5 MG/1
TABLET ORAL
Qty: 30 TABLET | Refills: 0 | Status: SHIPPED | OUTPATIENT
Start: 2018-11-26 | End: 2019-02-18 | Stop reason: SDUPTHER

## 2018-11-26 RX ORDER — PANTOPRAZOLE SODIUM 40 MG/1
TABLET, DELAYED RELEASE ORAL
Qty: 30 TABLET | Refills: 5 | Status: SHIPPED | OUTPATIENT
Start: 2018-11-26 | End: 2019-03-20 | Stop reason: SDUPTHER

## 2018-11-26 RX ORDER — FLUTICASONE PROPIONATE 50 MCG
2 SPRAY, SUSPENSION (ML) NASAL NIGHTLY PRN
Qty: 1 BOTTLE | Refills: 5 | Status: SHIPPED | OUTPATIENT
Start: 2018-11-26 | End: 2019-03-20 | Stop reason: SDUPTHER

## 2018-11-26 RX ORDER — ASPIRIN 81 MG/1
81 TABLET, CHEWABLE ORAL DAILY
Qty: 30 TABLET | Refills: 11 | Status: SHIPPED | OUTPATIENT
Start: 2018-11-26 | End: 2020-03-10 | Stop reason: SDUPTHER

## 2018-11-26 RX ORDER — PRAVASTATIN SODIUM 40 MG
40 TABLET ORAL EVERY EVENING
Qty: 30 TABLET | Refills: 3 | Status: SHIPPED | OUTPATIENT
Start: 2018-11-26 | End: 2019-03-20 | Stop reason: SDUPTHER

## 2018-11-26 RX ORDER — BUMETANIDE 2 MG/1
TABLET ORAL
Qty: 30 TABLET | Refills: 2 | Status: SHIPPED | OUTPATIENT
Start: 2018-11-26 | End: 2019-03-20 | Stop reason: SDUPTHER

## 2018-11-26 RX ORDER — ROPINIROLE 2 MG/1
2 TABLET, FILM COATED ORAL NIGHTLY
Qty: 30 TABLET | Refills: 5 | Status: SHIPPED | OUTPATIENT
Start: 2018-11-26 | End: 2019-03-20 | Stop reason: SDUPTHER

## 2018-11-26 RX ORDER — LISINOPRIL 10 MG/1
TABLET ORAL
Qty: 30 TABLET | Refills: 5 | Status: SHIPPED | OUTPATIENT
Start: 2018-11-26 | End: 2019-03-20 | Stop reason: SDUPTHER

## 2018-11-26 NOTE — LETTER
MEDICATION AGREEMENT    Patient Name:  Deven Salomon  Patient :          1960    Physician:  Layla Vyas, VITO      [id number] Date:  2018    To assist patients with certain conditions multiple classes of medications may be used to help manage your treatment better and to help improve your social and work activities. Because of the choice of medications you are taking, you are at a higher risk for developing addiction or dependency. The following prescribed need monitored more frequently and will require you to partner and assist in your healthcare. This alternative type of treatment does have risks and these will be discussed with you. Medication Dose Instructions Quantity Per Month   LORazepam (ATIVAN) 0.5 MG tablet      5 mg Take one pill daily 30                         Benefits and goals of Controlled Substance Medications: There are two potential goals for your treatment: (1) lower pain (2) improved daily life functions. There are many possible treatments for your chronic condition and we will try alternatives to medication as well. These may include: physical therapy, yoga, massage, home daily exercise, meditation, relaxation techniques, injections, chiropractic manipulations, surgery, cognitive therapy, hypnosis and many medications that are not addicting. Management of chronic pain specifically via medications can help but, it will not remove all of your pain and may not restore all function. Risks of Controlled Substance Medications: These medications can lead to problems such as addiction, sedation, falls, constipation, nausea, vomiting or overdose. They may cause sleepiness, lightheadedness, slow thinking and may impair you from driving or using equipment. They may cause problems with breathing, sleep apnea, reduced coughing, these are especially dangerous for patients with lung disease.  The medications may also lower testosterone, loss of bone strength, stamina and sex drive. For opiate medications, women who are of child bearing age 14-53 who could become pregnant should weigh the risks carefully with their physician as these medications make pregnancy more risky and the baby could be born sick and or addicted and have complications. For opiate medications, its possible to have dangerous interactions with alcohol and other medications. You may have an increase in pain called hyperalgesia, opioid medications can affect the brain and nerves that may cause increased pain and you may have trouble getting pain relief. To reduce the risks of harm to patients, we work to address lowering pain medication and improving your function. Dependence withdrawal symptoms may include; feelings of uneasiness, increased pain, irritability, belly pain, diarrhea, sweats and goose-flesh. 1. I understand that I have the following responsibilities:    ? I will take medications at the dose and frequency prescribed. ? I will not increase or change how I take my medications without the approval of the health care provider who signs this Medication Agreement. ? I will arrange for refills at the prescribed interval ONLY during regular office hours. I will not ask for refills earlier than agreed, after-hours, on holidays or on weekends. ? I will obtain all refills for medications at [facility] , with full consent for my provider and pharmacist to exchange information in writing or verbally. ? I will not request any pain medications or controlled substances from other providers and will inform this provider of all other medications I am taking. ? I will inform my other health care providers that I am taking these medications and of the existence of this [id number]. In the event of an emergency, I will provide the same information to the emergency department providers. ? I will protect my prescriptions and medications.  I understand that lost 6. I understand that this provider may stop prescribing the medications listed if:    ? I do not show any improvement in pain or my activity has not improved    ? I develop rapid tolerance or loss of improvement as described in my treatment plan    ? I develop significant side effects from the medication    ? My behavior is inconsistent with the responsibilities outlined above, which may also result in being prevented from receiving further care from this office. AGREEMENT: I have read the above and have had all my questions answered. For chronic pain management, I know that chronic pain can be managed with many types of treatments. A chronic opioid trial may be part of my treatment but I must be an active participant in my care. Opioid medication is only one part of my pain management. There is limited scientific data to suggest that using opioids over 4 months will lower my pain and or improve my daily function. There is some scientific information that suggests using chronic opioids can increase my pain, make me feel less well, and increase my risk of unintentional death directly related to the opioid medication. I know that if my provider feels my risk from controlled medications and or opioid therapy is greater than my benefit, I will have my controlled substance medications and or opioid medication compassionately lowered or removed altogether. I agree to a controlled substance medication or chronic opioid trial.     I further agree to allow this office to contact family or friends if there are concerns about my safety and use of the controlled medications. I Cande Ledezma have agreed to use the medications listed on this MEDICATION AGREEMENT as instructed by my physician and as stated in this Medication Agreement.        Patient Signature___________________________________________Date_________    Patient Printed Name_______________________________________ Provider Signature_________________________________________Date_________

## 2018-12-01 RX ORDER — SODIUM POLYSTYRENE SULFONATE 15 G/60ML
30 SUSPENSION ORAL; RECTAL ONCE
Qty: 1 BOTTLE | Refills: 0 | Status: SHIPPED | OUTPATIENT
Start: 2018-12-01 | End: 2019-01-29 | Stop reason: ALTCHOICE

## 2018-12-01 ASSESSMENT — ENCOUNTER SYMPTOMS
HEARTBURN: 1
BACK PAIN: 1
RESPIRATORY NEGATIVE: 1
EYES NEGATIVE: 1

## 2018-12-02 NOTE — PROGRESS NOTES
Have you seen any other physician or provider since your last visit yes - Dr. Mike Grey    Have you had any other diagnostic tests since your last visit? no    Have you changed or stopped any medications since your last visit? no
12/05/2017    MCV 98.1 12/05/2017     12/05/2017       Lab Results   Component Value Date    TSH 2.39 05/07/2018         ASSESSMENT/PLAN:   Diagnosis Orders   1. Mixed hyperlipidemia  Lipid Panel    Comprehensive Metabolic Panel   2. Hyperkalemia  Comprehensive Metabolic Panel   3. Peripheral polyneuropathy     4. Generalized OA     5. SANTINO (generalized anxiety disorder)  LORazepam (ATIVAN) 0.5 MG tablet        Orders Placed This Encounter   Procedures    Lipid Panel     Standing Status:   Future     Number of Occurrences:   1     Standing Expiration Date:   11/26/2019     Order Specific Question:   Is Patient Fasting?/# of Hours     Answer:   6    Comprehensive Metabolic Panel     Standing Status:   Future     Number of Occurrences:   1     Standing Expiration Date:   11/26/2019        Outpatient Encounter Prescriptions as of 11/26/2018   Medication Sig Dispense Refill    bumetanide (BUMEX) 2 MG tablet take 1 tablet by mouth once daily 30 tablet 2    pravastatin (PRAVACHOL) 40 MG tablet Take 1 tablet by mouth every evening 30 tablet 3    pantoprazole (PROTONIX) 40 MG tablet take 1 tablet by mouth once daily 30 tablet 5    LORazepam (ATIVAN) 0.5 MG tablet take 1 tablet by mouth every 8 hours if needed for anxiety. 30 tablet 0    lisinopril (PRINIVIL;ZESTRIL) 10 MG tablet take 1 tablet by mouth once daily 30 tablet 5    escitalopram (LEXAPRO) 10 MG tablet Take 1 tablet by mouth daily 30 tablet 5    vitamin D (ERGOCALCIFEROL) 69190 units CAPS capsule TAKE 1 CAPSULE BY MOUTH ONCE A WEEK 4 capsule 5    fluticasone (FLONASE) 50 MCG/ACT nasal spray 2 sprays by Nasal route nightly as needed for Rhinitis or Allergies 1 Bottle 5    rOPINIRole (REQUIP) 2 MG tablet Take 1 tablet by mouth nightly 30 tablet 5    aspirin (ASPIRIN CHILDRENS) 81 MG chewable tablet Take 1 tablet by mouth daily 30 tablet 11    morphine (MSIR) 15 MG tablet Take 15 mg by mouth daily. Fatuma Lyon lactulose (CHRONULAC) 10 GM/15ML solution

## 2018-12-03 ENCOUNTER — HOSPITAL ENCOUNTER (OUTPATIENT)
Facility: HOSPITAL | Age: 58
Discharge: HOME | End: 2018-12-03
Payer: MEDICAID

## 2018-12-03 DIAGNOSIS — E87.5 SERUM POTASSIUM ELEVATED: ICD-10-CM

## 2018-12-03 DIAGNOSIS — E87.5 SERUM POTASSIUM ELEVATED: Primary | ICD-10-CM

## 2018-12-03 LAB
ANION GAP SERPL CALCULATED.3IONS-SCNC: 10 MMOL/L (ref 3–16)
BUN BLDV-MCNC: 17 MG/DL (ref 6–20)
CALCIUM SERPL-MCNC: 9.7 MG/DL (ref 8.5–10.5)
CHLORIDE BLD-SCNC: 104 MMOL/L (ref 98–107)
CO2: 27 MMOL/L (ref 20–30)
CREAT SERPL-MCNC: 1 MG/DL (ref 0.4–1.2)
GFR AFRICAN AMERICAN: >59
GFR NON-AFRICAN AMERICAN: >59
GLUCOSE BLD-MCNC: 96 MG/DL (ref 74–106)
POTASSIUM SERPL-SCNC: 5.2 MMOL/L (ref 3.4–5.1)
SODIUM BLD-SCNC: 141 MMOL/L (ref 136–145)

## 2018-12-03 PROCEDURE — 80048 BASIC METABOLIC PNL TOTAL CA: CPT

## 2018-12-03 PROCEDURE — 36415 COLL VENOUS BLD VENIPUNCTURE: CPT

## 2019-01-29 ENCOUNTER — OFFICE VISIT (OUTPATIENT)
Dept: FAMILY MEDICINE CLINIC | Age: 59
End: 2019-01-29
Payer: MEDICAID

## 2019-01-29 VITALS
SYSTOLIC BLOOD PRESSURE: 132 MMHG | RESPIRATION RATE: 18 BRPM | WEIGHT: 220 LBS | HEIGHT: 69 IN | DIASTOLIC BLOOD PRESSURE: 74 MMHG | HEART RATE: 95 BPM | BODY MASS INDEX: 32.58 KG/M2 | OXYGEN SATURATION: 98 %

## 2019-01-29 DIAGNOSIS — M16.11 OSTEOARTHRITIS OF RIGHT HIP, UNSPECIFIED OSTEOARTHRITIS TYPE: Primary | ICD-10-CM

## 2019-01-29 DIAGNOSIS — Z01.818 PRE-OP EXAMINATION: ICD-10-CM

## 2019-01-29 PROCEDURE — 99213 OFFICE O/P EST LOW 20 MIN: CPT | Performed by: NURSE PRACTITIONER

## 2019-01-29 ASSESSMENT — PATIENT HEALTH QUESTIONNAIRE - PHQ9
SUM OF ALL RESPONSES TO PHQ9 QUESTIONS 1 & 2: 0
SUM OF ALL RESPONSES TO PHQ QUESTIONS 1-9: 0
2. FEELING DOWN, DEPRESSED OR HOPELESS: 0
1. LITTLE INTEREST OR PLEASURE IN DOING THINGS: 0
SUM OF ALL RESPONSES TO PHQ QUESTIONS 1-9: 0

## 2019-02-18 DIAGNOSIS — F41.1 GAD (GENERALIZED ANXIETY DISORDER): ICD-10-CM

## 2019-02-18 RX ORDER — LORAZEPAM 0.5 MG/1
TABLET ORAL
Qty: 30 TABLET | Refills: 0 | Status: SHIPPED | OUTPATIENT
Start: 2019-02-18 | End: 2019-03-20 | Stop reason: SDUPTHER

## 2019-03-20 ENCOUNTER — OFFICE VISIT (OUTPATIENT)
Dept: FAMILY MEDICINE CLINIC | Age: 59
End: 2019-03-20
Payer: MEDICAID

## 2019-03-20 ENCOUNTER — HOSPITAL ENCOUNTER (OUTPATIENT)
Facility: HOSPITAL | Age: 59
Discharge: HOME | End: 2019-03-20
Payer: MEDICAID

## 2019-03-20 VITALS
WEIGHT: 213.4 LBS | SYSTOLIC BLOOD PRESSURE: 102 MMHG | RESPIRATION RATE: 18 BRPM | BODY MASS INDEX: 31.61 KG/M2 | OXYGEN SATURATION: 97 % | HEIGHT: 69 IN | HEART RATE: 89 BPM | DIASTOLIC BLOOD PRESSURE: 70 MMHG

## 2019-03-20 DIAGNOSIS — F41.1 GAD (GENERALIZED ANXIETY DISORDER): ICD-10-CM

## 2019-03-20 DIAGNOSIS — E78.2 MIXED HYPERLIPIDEMIA: ICD-10-CM

## 2019-03-20 DIAGNOSIS — R73.9 HYPERGLYCEMIA: ICD-10-CM

## 2019-03-20 DIAGNOSIS — M25.50 POLYARTHRALGIA: ICD-10-CM

## 2019-03-20 DIAGNOSIS — R53.83 FATIGUE, UNSPECIFIED TYPE: ICD-10-CM

## 2019-03-20 DIAGNOSIS — E78.2 MIXED HYPERLIPIDEMIA: Primary | ICD-10-CM

## 2019-03-20 DIAGNOSIS — G62.9 PERIPHERAL POLYNEUROPATHY: ICD-10-CM

## 2019-03-20 PROCEDURE — 80061 LIPID PANEL: CPT

## 2019-03-20 PROCEDURE — G8417 CALC BMI ABV UP PARAM F/U: HCPCS | Performed by: NURSE PRACTITIONER

## 2019-03-20 PROCEDURE — 1036F TOBACCO NON-USER: CPT | Performed by: NURSE PRACTITIONER

## 2019-03-20 PROCEDURE — 80053 COMPREHEN METABOLIC PANEL: CPT

## 2019-03-20 PROCEDURE — 3017F COLORECTAL CA SCREEN DOC REV: CPT | Performed by: NURSE PRACTITIONER

## 2019-03-20 PROCEDURE — G8482 FLU IMMUNIZE ORDER/ADMIN: HCPCS | Performed by: NURSE PRACTITIONER

## 2019-03-20 PROCEDURE — 99214 OFFICE O/P EST MOD 30 MIN: CPT | Performed by: NURSE PRACTITIONER

## 2019-03-20 PROCEDURE — G8427 DOCREV CUR MEDS BY ELIG CLIN: HCPCS | Performed by: NURSE PRACTITIONER

## 2019-03-20 PROCEDURE — 84443 ASSAY THYROID STIM HORMONE: CPT

## 2019-03-20 PROCEDURE — 83036 HEMOGLOBIN GLYCOSYLATED A1C: CPT

## 2019-03-20 RX ORDER — LORAZEPAM 0.5 MG/1
TABLET ORAL
Qty: 30 TABLET | Refills: 0 | Status: SHIPPED | OUTPATIENT
Start: 2019-03-20 | End: 2019-05-16 | Stop reason: SDUPTHER

## 2019-03-20 RX ORDER — LISINOPRIL 10 MG/1
TABLET ORAL
Qty: 30 TABLET | Refills: 5 | Status: SHIPPED | OUTPATIENT
Start: 2019-03-20 | End: 2019-09-15 | Stop reason: SDUPTHER

## 2019-03-20 RX ORDER — ERGOCALCIFEROL 1.25 MG/1
CAPSULE ORAL
Qty: 4 CAPSULE | Refills: 5 | Status: SHIPPED | OUTPATIENT
Start: 2019-03-20 | End: 2019-11-12 | Stop reason: SDUPTHER

## 2019-03-20 RX ORDER — PANTOPRAZOLE SODIUM 40 MG/1
TABLET, DELAYED RELEASE ORAL
Qty: 30 TABLET | Refills: 5 | Status: SHIPPED | OUTPATIENT
Start: 2019-03-20 | End: 2020-03-10 | Stop reason: SDUPTHER

## 2019-03-20 RX ORDER — ROPINIROLE 2 MG/1
2 TABLET, FILM COATED ORAL NIGHTLY
Qty: 30 TABLET | Refills: 5 | Status: SHIPPED | OUTPATIENT
Start: 2019-03-20 | End: 2019-09-15 | Stop reason: SDUPTHER

## 2019-03-20 RX ORDER — ESCITALOPRAM OXALATE 10 MG/1
10 TABLET ORAL DAILY
Qty: 30 TABLET | Refills: 5 | Status: SHIPPED | OUTPATIENT
Start: 2019-03-20 | End: 2019-09-15 | Stop reason: SDUPTHER

## 2019-03-20 RX ORDER — FLUTICASONE PROPIONATE 50 MCG
2 SPRAY, SUSPENSION (ML) NASAL NIGHTLY PRN
Qty: 1 BOTTLE | Refills: 5 | Status: SHIPPED | OUTPATIENT
Start: 2019-03-20 | End: 2020-03-10 | Stop reason: SDUPTHER

## 2019-03-20 RX ORDER — PRAVASTATIN SODIUM 40 MG
40 TABLET ORAL EVERY EVENING
Qty: 30 TABLET | Refills: 3 | Status: SHIPPED | OUTPATIENT
Start: 2019-03-20 | End: 2019-07-16 | Stop reason: SDUPTHER

## 2019-03-20 RX ORDER — BUMETANIDE 2 MG/1
TABLET ORAL
Qty: 30 TABLET | Refills: 2 | Status: SHIPPED | OUTPATIENT
Start: 2019-03-20 | End: 2019-06-15 | Stop reason: SDUPTHER

## 2019-03-20 ASSESSMENT — ENCOUNTER SYMPTOMS
BACK PAIN: 1
RESPIRATORY NEGATIVE: 1
EYES NEGATIVE: 1
HEARTBURN: 1

## 2019-03-21 LAB
A/G RATIO: 1.4 (ref 0.8–2)
ALBUMIN SERPL-MCNC: 3.8 G/DL (ref 3.4–4.8)
ALP BLD-CCNC: 162 U/L (ref 25–100)
ALT SERPL-CCNC: 16 U/L (ref 4–36)
ANION GAP SERPL CALCULATED.3IONS-SCNC: 11 MMOL/L (ref 3–16)
AST SERPL-CCNC: 17 U/L (ref 8–33)
BILIRUB SERPL-MCNC: 0.4 MG/DL (ref 0.3–1.2)
BUN BLDV-MCNC: 15 MG/DL (ref 6–20)
CALCIUM SERPL-MCNC: 9.5 MG/DL (ref 8.5–10.5)
CHLORIDE BLD-SCNC: 104 MMOL/L (ref 98–107)
CHOLESTEROL, TOTAL: 132 MG/DL (ref 0–200)
CO2: 25 MMOL/L (ref 20–30)
CREAT SERPL-MCNC: 1 MG/DL (ref 0.4–1.2)
GFR AFRICAN AMERICAN: >59
GFR NON-AFRICAN AMERICAN: >59
GLOBULIN: 2.7 G/DL
GLUCOSE BLD-MCNC: 98 MG/DL (ref 74–106)
HBA1C MFR BLD: 5.3 %
HDLC SERPL-MCNC: 41 MG/DL (ref 40–60)
LDL CHOLESTEROL CALCULATED: 60 MG/DL
POTASSIUM SERPL-SCNC: 5.1 MMOL/L (ref 3.4–5.1)
SODIUM BLD-SCNC: 140 MMOL/L (ref 136–145)
TOTAL PROTEIN: 6.5 G/DL (ref 6.4–8.3)
TRIGL SERPL-MCNC: 155 MG/DL (ref 0–249)
TSH SERPL DL<=0.05 MIU/L-ACNC: 1.61 UIU/ML (ref 0.35–5.5)
VLDLC SERPL CALC-MCNC: 31 MG/DL

## 2019-05-06 ENCOUNTER — OFFICE VISIT (OUTPATIENT)
Dept: FAMILY MEDICINE CLINIC | Age: 59
End: 2019-05-06
Payer: MEDICAID

## 2019-05-06 VITALS
HEART RATE: 90 BPM | RESPIRATION RATE: 18 BRPM | SYSTOLIC BLOOD PRESSURE: 130 MMHG | OXYGEN SATURATION: 97 % | DIASTOLIC BLOOD PRESSURE: 70 MMHG | TEMPERATURE: 98.1 F | WEIGHT: 215 LBS | BODY MASS INDEX: 31.84 KG/M2 | HEIGHT: 69 IN

## 2019-05-06 DIAGNOSIS — B02.9 HERPES ZOSTER WITHOUT COMPLICATION: Primary | ICD-10-CM

## 2019-05-06 PROCEDURE — 1036F TOBACCO NON-USER: CPT | Performed by: NURSE PRACTITIONER

## 2019-05-06 PROCEDURE — G8417 CALC BMI ABV UP PARAM F/U: HCPCS | Performed by: NURSE PRACTITIONER

## 2019-05-06 PROCEDURE — 3017F COLORECTAL CA SCREEN DOC REV: CPT | Performed by: NURSE PRACTITIONER

## 2019-05-06 PROCEDURE — G8427 DOCREV CUR MEDS BY ELIG CLIN: HCPCS | Performed by: NURSE PRACTITIONER

## 2019-05-06 PROCEDURE — 99213 OFFICE O/P EST LOW 20 MIN: CPT | Performed by: NURSE PRACTITIONER

## 2019-05-06 RX ORDER — GABAPENTIN 300 MG/1
300 CAPSULE ORAL 3 TIMES DAILY
Qty: 90 CAPSULE | Refills: 1 | Status: SHIPPED | OUTPATIENT
Start: 2019-05-06 | End: 2019-07-09 | Stop reason: SDUPTHER

## 2019-05-06 RX ORDER — DEXAMETHASONE SODIUM PHOSPHATE 4 MG/ML
8 INJECTION, SOLUTION INTRA-ARTICULAR; INTRALESIONAL; INTRAMUSCULAR; INTRAVENOUS; SOFT TISSUE ONCE
Status: COMPLETED | OUTPATIENT
Start: 2019-05-06 | End: 2019-05-06

## 2019-05-06 RX ORDER — OXYCODONE AND ACETAMINOPHEN 10; 325 MG/1; MG/1
TABLET ORAL
Refills: 0 | COMMUNITY
Start: 2019-04-07 | End: 2019-07-15 | Stop reason: SDUPTHER

## 2019-05-06 RX ADMIN — DEXAMETHASONE SODIUM PHOSPHATE 8 MG: 4 INJECTION, SOLUTION INTRA-ARTICULAR; INTRALESIONAL; INTRAMUSCULAR; INTRAVENOUS; SOFT TISSUE at 14:27

## 2019-05-06 NOTE — PROGRESS NOTES
Administrations This Visit     dexamethasone (DECADRON) injection 8 mg     Admin Date  05/06/2019  14:27 Action  Given Dose  8 mg Route  Intramuscular Site  Dorsogluteal Left Administered By  Rosalva Wilson RN    Ordering Provider:  VITO Fry    NDC:  78209-347-81    Lot#:  [id number]    :  [address] [facility]    Patient Supplied?:  No

## 2019-05-06 NOTE — PROGRESS NOTES
SUBJECTIVE:    Mario Price is a 66 y.o. male . Chief Complaint   Patient presents with    Rash     painful rash on right side of back spreading around to stomach x 1 week        HPI:   Present with painful rash to right back, side and abd that has a typical shingles rash appearance. Started over 1 week ago on back and progress around to mid right abdomen. At first he thought is was poison ivy, but is very painful. Reports pain is at 8/10. Rash   This is a new problem. The current episode started in the past 7 days. The problem is unchanged. The affected locations include the back and abdomen. The rash is characterized by burning (painful, mostly scabbed. ). He was exposed to nothing. Past treatments include nothing. The treatment provided no relief.         No Known Allergies   Past Medical History:   Diagnosis Date    Chronic back pain     DDD (degenerative disc disease)     DVT (deep venous thrombosis) ([facility])     Dyspepsia     Hyperlipidemia     Hypertension 2017    Osteoarthritis       Family History   Problem Relation Age of Onset    Diabetes Mother     Heart Disease Mother     High Blood Pressure Mother     Arthritis Mother     High Blood Pressure Father     Heart Disease Father       Social History     Socioeconomic History    Marital status:      Spouse name: Not on file    Number of children: Not on file    Years of education: Not on file    Highest education level: Not on file   Occupational History    Not on file   Social Needs    Financial resource strain: Not on file    Food insecurity:     Worry: Not on file     Inability: Not on file    Transportation needs:     Medical: Not on file     Non-medical: Not on file   Tobacco Use    Smoking status: Former Smoker     Packs/day: 1.00     Years: 42.00     Pack years: 42.00     Start date:      Last attempt to quit:      Years since quittin.4    Smokeless tobacco: Never Used   Substance and Sexual Activity  Alcohol use: No    Drug use: No    Sexual activity: Yes     Partners: Female   Lifestyle    Physical activity:     Days per week: Not on file     Minutes per session: Not on file    Stress: Not on file   Relationships    Social connections:     Talks on phone: Not on file     Gets together: Not on file     Attends [employer]ism service: Not on file     Active member of club or organization: Not on file     Attends meetings of clubs or organizations: Not on file     Relationship status: Not on file    Intimate partner violence:     Fear of current or ex partner: Not on file     Emotionally abused: Not on file     Physically abused: Not on file     Forced sexual activity: Not on file   Other Topics Concern    Not on file   Social History Narrative    Not on file        Patient's medications, allergies, past medical, surgical, social and family history were reviewed and updated as appropirate. Review of Systems   Skin: Positive for rash (scabbed rash, back, right side and abdomen ). Pallor:  painful shingles rash. All other systems reviewed and are negative. OBJECTIVE:    /70   Pulse 90   Temp 98.1 °F (36.7 °C)   Resp 18   Ht 5' 9\" (1.753 m)   Wt 215 lb (97.5 kg)   SpO2 97% Comment: room air  BMI 31.75 kg/m²   BP Readings from Last 3 Encounters:   05/06/19 130/70   03/20/19 102/70   01/29/19 132/74     Body mass index is 31.75 kg/m². Physical Exam   Constitutional: He is oriented to person, place, and time. He appears well-developed and well-nourished. HENT:   Head: Normocephalic and atraumatic. Right Ear: External ear normal.   Left Ear: External ear normal.   Nose: Nose normal.   Mouth/Throat: Oropharynx is clear and moist.   Eyes: Pupils are equal, round, and reactive to light. Conjunctivae and EOM are normal.   Neck: Normal range of motion. Neck supple. Cardiovascular: Normal rate, regular rhythm, normal heart sounds and intact distal pulses.    Pulmonary/Chest: Effort normal and breath sounds normal.   Abdominal: Soft. Bowel sounds are normal.   Musculoskeletal: Normal range of motion. Neurological: He is alert and oriented to person, place, and time. He has normal reflexes. Skin: Skin is warm and dry. Capillary refill takes less than 2 seconds. Rash noted. Psychiatric: He has a normal mood and affect. His behavior is normal. Judgment and thought content normal.   Nursing note and vitals reviewed. No results found for requested labs within last 30 days. Hemoglobin A1C (%)   Date Value   03/20/2019 5.3     LDL Calculated (mg/dL)   Date Value   03/20/2019 60         Lab Results   Component Value Date    WBC 6.3 12/05/2017    NEUTROABS 2.8 12/05/2017    HGB 14.6 12/05/2017    HCT 45.8 12/05/2017    MCV 98.1 12/05/2017     12/05/2017       Lab Results   Component Value Date    TSH 1.61 03/20/2019       ASSESSMENT / Bradley Robison was seen today for rash. Diagnoses and all orders for this visit:    Herpes zoster without complication  Initial break out period was approx 1 week ago. Initiating anti-viral at this point would provided no added benefit. -     dexamethasone (DECADRON) injection 8 mg  -     gabapentin (NEURONTIN) 300 MG capsule; Take 1 capsule by mouth 3 times daily for 30 days. Intended supply: 90 days  -  Monitor for symptoms of infection. There are no discontinued medications. PATIENT COUNSELING     Counseling was provided today regarding the following topics: Healthy eating habits, Regular exercise, substance abuse and healthy sleep habits. Discussed use, benefit, and side effects of prescribed medications. Barriers to medication compliance addressed. All patient questions answered. Patient voices understanding. Return if symptoms worsen or fail to improve.     VITO Riley

## 2019-05-16 DIAGNOSIS — F41.1 GAD (GENERALIZED ANXIETY DISORDER): ICD-10-CM

## 2019-05-16 RX ORDER — LORAZEPAM 0.5 MG/1
TABLET ORAL
Qty: 30 TABLET | Refills: 0 | Status: SHIPPED | OUTPATIENT
Start: 2019-05-16 | End: 2019-06-15

## 2019-06-17 RX ORDER — BUMETANIDE 2 MG/1
TABLET ORAL
Qty: 30 TABLET | Refills: 2 | Status: SHIPPED | OUTPATIENT
Start: 2019-06-17 | End: 2019-09-15 | Stop reason: SDUPTHER

## 2019-07-09 DIAGNOSIS — B02.9 HERPES ZOSTER WITHOUT COMPLICATION: ICD-10-CM

## 2019-07-09 RX ORDER — GABAPENTIN 300 MG/1
300 CAPSULE ORAL 3 TIMES DAILY
Qty: 90 CAPSULE | Refills: 2 | Status: SHIPPED
Start: 2019-07-09 | End: 2019-07-15 | Stop reason: ALTCHOICE

## 2019-07-09 NOTE — TELEPHONE ENCOUNTER
Refill request received from pharmacy.  Medication pending for approval.     Patient information below:      Hemoglobin A1C (%)   Date Value   03/20/2019 5.3   07/12/2016 5.7     LDL Calculated (mg/dL)   Date Value   03/20/2019 60     AST (U/L)   Date Value   03/20/2019 17     ALT (U/L)   Date Value   03/20/2019 16     BUN (mg/dL)   Date Value   03/20/2019 15      (goal A1C is < 7)   (goal LDL is <100) need 30-50% reduction from baseline     BP Readings from Last 3 Encounters:   05/06/19 130/70   03/20/19 102/70   01/29/19 132/74    (goal /80)      All Future Testing planned in [facility]:  Lab Frequency Next Occurrence   Protime         Last Office Visit With PCP:  6/15/2019      Next Visit Date:  Future Appointments   Date Time Provider Octavio Camargo   7/15/2019 10:30 AM VITO Ruelas [address] [place] [address]            Patient Active Problem List:     DVT (deep venous thrombosis) ([facility]CC)     Generalized OA     DDD (degenerative disc disease)     Dyspepsia     Incisional hernia     Peripheral neuropathy     Hyperkalemia     Hyperlipidemia     Polyarthralgia     Edema     Vitamin D deficiency     B12 deficiency     Internal hemorrhoid     Diverticular disease of large intestine without perforation or abscess     SANTINO (generalized anxiety disorder)

## 2019-07-15 ENCOUNTER — OFFICE VISIT (OUTPATIENT)
Dept: FAMILY MEDICINE CLINIC | Age: 59
End: 2019-07-15
Payer: MEDICAID

## 2019-07-15 ENCOUNTER — HOSPITAL ENCOUNTER (OUTPATIENT)
Facility: HOSPITAL | Age: 59
Discharge: HOME | End: 2019-07-15
Payer: MEDICAID

## 2019-07-15 VITALS
HEART RATE: 87 BPM | HEIGHT: 69 IN | WEIGHT: 224.1 LBS | DIASTOLIC BLOOD PRESSURE: 80 MMHG | OXYGEN SATURATION: 98 % | SYSTOLIC BLOOD PRESSURE: 138 MMHG | RESPIRATION RATE: 18 BRPM | BODY MASS INDEX: 33.19 KG/M2

## 2019-07-15 DIAGNOSIS — M25.50 POLYARTHRALGIA: ICD-10-CM

## 2019-07-15 DIAGNOSIS — G62.9 PERIPHERAL POLYNEUROPATHY: ICD-10-CM

## 2019-07-15 DIAGNOSIS — M54.16 LUMBAR BACK PAIN WITH RADICULOPATHY AFFECTING RIGHT LOWER EXTREMITY: ICD-10-CM

## 2019-07-15 DIAGNOSIS — R73.9 HYPERGLYCEMIA: ICD-10-CM

## 2019-07-15 DIAGNOSIS — R53.83 FATIGUE, UNSPECIFIED TYPE: ICD-10-CM

## 2019-07-15 DIAGNOSIS — F41.1 GAD (GENERALIZED ANXIETY DISORDER): ICD-10-CM

## 2019-07-15 DIAGNOSIS — E78.2 MIXED HYPERLIPIDEMIA: ICD-10-CM

## 2019-07-15 DIAGNOSIS — E78.2 MIXED HYPERLIPIDEMIA: Primary | ICD-10-CM

## 2019-07-15 LAB
A/G RATIO: 1.7 (ref 0.8–2)
ALBUMIN SERPL-MCNC: 4.3 G/DL (ref 3.4–4.8)
ALP BLD-CCNC: 160 U/L (ref 25–100)
ALT SERPL-CCNC: 29 U/L (ref 4–36)
ANION GAP SERPL CALCULATED.3IONS-SCNC: 10 MMOL/L (ref 3–16)
AST SERPL-CCNC: 19 U/L (ref 8–33)
BILIRUB SERPL-MCNC: 0.4 MG/DL (ref 0.3–1.2)
BUN BLDV-MCNC: 18 MG/DL (ref 6–20)
CALCIUM SERPL-MCNC: 9.5 MG/DL (ref 8.5–10.5)
CHLORIDE BLD-SCNC: 102 MMOL/L (ref 98–107)
CHOLESTEROL, TOTAL: 178 MG/DL (ref 0–200)
CO2: 27 MMOL/L (ref 20–30)
CREAT SERPL-MCNC: 0.8 MG/DL (ref 0.4–1.2)
GFR AFRICAN AMERICAN: >59
GFR NON-AFRICAN AMERICAN: >59
GLOBULIN: 2.5 G/DL
GLUCOSE BLD-MCNC: 99 MG/DL (ref 74–106)
HBA1C MFR BLD: 5.8 %
HDLC SERPL-MCNC: 46 MG/DL (ref 40–60)
LDL CHOLESTEROL CALCULATED: 87 MG/DL
POTASSIUM SERPL-SCNC: 5 MMOL/L (ref 3.4–5.1)
SODIUM BLD-SCNC: 139 MMOL/L (ref 136–145)
TOTAL PROTEIN: 6.8 G/DL (ref 6.4–8.3)
TRIGL SERPL-MCNC: 224 MG/DL (ref 0–249)
TSH SERPL DL<=0.05 MIU/L-ACNC: 1.87 UIU/ML (ref 0.35–5.5)
URIC ACID, SERUM: 7.7 MG/DL (ref 3.7–8.6)
VLDLC SERPL CALC-MCNC: 45 MG/DL

## 2019-07-15 PROCEDURE — 83036 HEMOGLOBIN GLYCOSYLATED A1C: CPT

## 2019-07-15 PROCEDURE — 80053 COMPREHEN METABOLIC PANEL: CPT

## 2019-07-15 PROCEDURE — 84550 ASSAY OF BLOOD/URIC ACID: CPT

## 2019-07-15 PROCEDURE — 80061 LIPID PANEL: CPT

## 2019-07-15 PROCEDURE — 84443 ASSAY THYROID STIM HORMONE: CPT

## 2019-07-15 PROCEDURE — G8427 DOCREV CUR MEDS BY ELIG CLIN: HCPCS | Performed by: NURSE PRACTITIONER

## 2019-07-15 PROCEDURE — 99214 OFFICE O/P EST MOD 30 MIN: CPT | Performed by: NURSE PRACTITIONER

## 2019-07-15 PROCEDURE — 1036F TOBACCO NON-USER: CPT | Performed by: NURSE PRACTITIONER

## 2019-07-15 PROCEDURE — 3017F COLORECTAL CA SCREEN DOC REV: CPT | Performed by: NURSE PRACTITIONER

## 2019-07-15 PROCEDURE — G8417 CALC BMI ABV UP PARAM F/U: HCPCS | Performed by: NURSE PRACTITIONER

## 2019-07-18 RX ORDER — PRAVASTATIN SODIUM 40 MG
TABLET ORAL
Qty: 30 TABLET | Refills: 3 | Status: SHIPPED | OUTPATIENT
Start: 2019-07-18 | End: 2019-11-14 | Stop reason: SDUPTHER

## 2019-07-28 ASSESSMENT — ENCOUNTER SYMPTOMS
HEARTBURN: 1
BACK PAIN: 1
RESPIRATORY NEGATIVE: 1
EYES NEGATIVE: 1

## 2019-07-28 NOTE — PROGRESS NOTES
Have you seen any other physician or provider since your last visit yes - Pain management, Dr. Nicholas Redmond     Have you had any other diagnostic tests since your last visit? no    Have you changed or stopped any medications since your last visit?  yes -  Chart updated
exhibits decreased range of motion, tenderness and pain. Thoracic back: He exhibits decreased range of motion and tenderness. Lumbar back: He exhibits decreased range of motion, tenderness and pain. Neurological: He is alert and oriented to person, place, and time. Skin: Skin is warm, dry and intact. Psychiatric: He has a normal mood and affect. His behavior is normal. Judgment and thought content normal.   Nursing note and vitals reviewed.     Results in Past 30 Days  Result Component Current Result Ref Range Previous Result Ref Range   Alb 4.3 (7/15/2019) 3.4 - 4.8 g/dL Not in Time Range    Albumin/Globulin Ratio 1.7 (7/15/2019) 0.8 - 2.0 Not in Time Range    Alkaline Phosphatase 160 (H) (7/15/2019) 25 - 100 U/L Not in Time Range    ALT 29 (7/15/2019) 4 - 36 U/L Not in Time Range    AST 19 (7/15/2019) 8 - 33 U/L Not in Time Range    BUN 18 (7/15/2019) 6 - 20 mg/dL Not in Time Range    Calcium 9.5 (7/15/2019) 8.5 - 10.5 mg/dL Not in Time Range    Chloride 102 (7/15/2019) 98 - 107 mmol/L Not in Time Range    CO2 27 (7/15/2019) 20 - 30 mmol/L Not in Time Range    CREATININE 0.8 (7/15/2019) 0.4 - 1.2 mg/dL Not in Time Range    GFR  >59 (7/15/2019) >59 Not in Time Range    GFR Non- >59 (7/15/2019) >59 Not in Time Range    Globulin 2.5 (7/15/2019) g/dL Not in Time Range    Glucose 99 (7/15/2019) 74 - 106 mg/dL Not in Time Range    Potassium 5.0 (7/15/2019) 3.4 - 5.1 mmol/L Not in Time Range    Sodium 139 (7/15/2019) 136 - 145 mmol/L Not in Time Range    Total Bilirubin 0.4 (7/15/2019) 0.3 - 1.2 mg/dL Not in Time Range    Total Protein 6.8 (7/15/2019) 6.4 - 8.3 g/dL Not in Time Range        Hemoglobin A1C (%)   Date Value   07/15/2019 5.8     LDL Calculated (mg/dL)   Date Value   07/15/2019 87         Lab Results   Component Value Date    WBC 6.3 12/05/2017    NEUTROABS 2.8 12/05/2017    HGB 14.6 12/05/2017    HCT 45.8 12/05/2017    MCV 98.1 12/05/2017

## 2019-09-16 RX ORDER — ESCITALOPRAM OXALATE 10 MG/1
TABLET ORAL
Qty: 30 TABLET | Refills: 5 | Status: SHIPPED | OUTPATIENT
Start: 2019-09-16 | End: 2020-06-02

## 2019-09-16 RX ORDER — LISINOPRIL 10 MG/1
TABLET ORAL
Qty: 30 TABLET | Refills: 5 | Status: SHIPPED | OUTPATIENT
Start: 2019-09-16 | End: 2020-03-10 | Stop reason: SDUPTHER

## 2019-09-16 RX ORDER — ROPINIROLE 2 MG/1
TABLET, FILM COATED ORAL
Qty: 30 TABLET | Refills: 5 | Status: SHIPPED | OUTPATIENT
Start: 2019-09-16 | End: 2020-03-10 | Stop reason: SDUPTHER

## 2019-09-16 RX ORDER — BUMETANIDE 2 MG/1
TABLET ORAL
Qty: 30 TABLET | Refills: 2 | Status: SHIPPED | OUTPATIENT
Start: 2019-09-16 | End: 2020-03-10 | Stop reason: SDUPTHER

## 2019-11-12 RX ORDER — ERGOCALCIFEROL 1.25 MG/1
CAPSULE ORAL
Qty: 4 CAPSULE | Refills: 5 | Status: SHIPPED | OUTPATIENT
Start: 2019-11-12 | End: 2020-03-10 | Stop reason: SDUPTHER

## 2019-11-14 RX ORDER — PRAVASTATIN SODIUM 40 MG
TABLET ORAL
Qty: 30 TABLET | Refills: 5 | Status: SHIPPED | OUTPATIENT
Start: 2019-11-14 | End: 2020-03-10 | Stop reason: SDUPTHER

## 2020-03-10 ENCOUNTER — OFFICE VISIT (OUTPATIENT)
Dept: FAMILY MEDICINE CLINIC | Age: 60
End: 2020-03-10
Payer: MEDICAID

## 2020-03-10 ENCOUNTER — HOSPITAL ENCOUNTER (OUTPATIENT)
Facility: HOSPITAL | Age: 60
Discharge: HOME | End: 2020-03-10
Payer: MEDICAID

## 2020-03-10 VITALS
RESPIRATION RATE: 18 BRPM | HEART RATE: 86 BPM | WEIGHT: 225.3 LBS | BODY MASS INDEX: 33.37 KG/M2 | SYSTOLIC BLOOD PRESSURE: 138 MMHG | HEIGHT: 69 IN | DIASTOLIC BLOOD PRESSURE: 78 MMHG

## 2020-03-10 LAB
A/G RATIO: 1.7 (ref 0.8–2)
ALBUMIN SERPL-MCNC: 4.2 G/DL (ref 3.4–4.8)
ALP BLD-CCNC: 112 U/L (ref 25–100)
ALT SERPL-CCNC: 23 U/L (ref 4–36)
ANION GAP SERPL CALCULATED.3IONS-SCNC: 12 MMOL/L (ref 3–16)
AST SERPL-CCNC: 18 U/L (ref 8–33)
BILIRUB SERPL-MCNC: 0.4 MG/DL (ref 0.3–1.2)
BUN BLDV-MCNC: 14 MG/DL (ref 6–20)
CALCIUM SERPL-MCNC: 9.5 MG/DL (ref 8.5–10.5)
CHLORIDE BLD-SCNC: 101 MMOL/L (ref 98–107)
CHOLESTEROL, TOTAL: 190 MG/DL (ref 0–200)
CO2: 28 MMOL/L (ref 20–30)
CREAT SERPL-MCNC: 1 MG/DL (ref 0.4–1.2)
GFR AFRICAN AMERICAN: >59
GFR NON-AFRICAN AMERICAN: >59
GLOBULIN: 2.5 G/DL
GLUCOSE BLD-MCNC: 108 MG/DL (ref 74–106)
HDLC SERPL-MCNC: 50 MG/DL (ref 40–60)
LDL CHOLESTEROL CALCULATED: 115 MG/DL
POTASSIUM SERPL-SCNC: 5.4 MMOL/L (ref 3.4–5.1)
SODIUM BLD-SCNC: 141 MMOL/L (ref 136–145)
TOTAL PROTEIN: 6.7 G/DL (ref 6.4–8.3)
TRIGL SERPL-MCNC: 127 MG/DL (ref 0–249)
TSH SERPL DL<=0.05 MIU/L-ACNC: 1.52 UIU/ML (ref 0.35–5.5)
VLDLC SERPL CALC-MCNC: 25 MG/DL

## 2020-03-10 PROCEDURE — 1036F TOBACCO NON-USER: CPT | Performed by: NURSE PRACTITIONER

## 2020-03-10 PROCEDURE — 84443 ASSAY THYROID STIM HORMONE: CPT

## 2020-03-10 PROCEDURE — G8482 FLU IMMUNIZE ORDER/ADMIN: HCPCS | Performed by: NURSE PRACTITIONER

## 2020-03-10 PROCEDURE — G8427 DOCREV CUR MEDS BY ELIG CLIN: HCPCS | Performed by: NURSE PRACTITIONER

## 2020-03-10 PROCEDURE — 90471 IMMUNIZATION ADMIN: CPT | Performed by: NURSE PRACTITIONER

## 2020-03-10 PROCEDURE — 90686 IIV4 VACC NO PRSV 0.5 ML IM: CPT | Performed by: NURSE PRACTITIONER

## 2020-03-10 PROCEDURE — 80061 LIPID PANEL: CPT

## 2020-03-10 PROCEDURE — 80053 COMPREHEN METABOLIC PANEL: CPT

## 2020-03-10 PROCEDURE — 99214 OFFICE O/P EST MOD 30 MIN: CPT | Performed by: NURSE PRACTITIONER

## 2020-03-10 PROCEDURE — G8417 CALC BMI ABV UP PARAM F/U: HCPCS | Performed by: NURSE PRACTITIONER

## 2020-03-10 PROCEDURE — 3017F COLORECTAL CA SCREEN DOC REV: CPT | Performed by: NURSE PRACTITIONER

## 2020-03-10 RX ORDER — LACTULOSE 10 G/15ML
10 SOLUTION ORAL NIGHTLY
Qty: 3 BOTTLE | Refills: 11 | Status: SHIPPED | OUTPATIENT
Start: 2020-03-10 | End: 2022-03-14 | Stop reason: SDUPTHER

## 2020-03-10 RX ORDER — PRAVASTATIN SODIUM 40 MG
TABLET ORAL
Qty: 30 TABLET | Refills: 11 | Status: SHIPPED | OUTPATIENT
Start: 2020-03-10 | End: 2020-06-16

## 2020-03-10 RX ORDER — ASPIRIN 81 MG/1
81 TABLET, CHEWABLE ORAL DAILY
Qty: 30 TABLET | Refills: 11 | Status: SHIPPED | OUTPATIENT
Start: 2020-03-10 | End: 2021-02-22

## 2020-03-10 RX ORDER — FLUTICASONE PROPIONATE 50 MCG
2 SPRAY, SUSPENSION (ML) NASAL NIGHTLY PRN
Qty: 1 BOTTLE | Refills: 11 | Status: SHIPPED | OUTPATIENT
Start: 2020-03-10 | End: 2022-03-14 | Stop reason: SDUPTHER

## 2020-03-10 RX ORDER — ROPINIROLE 2 MG/1
TABLET, FILM COATED ORAL
Qty: 30 TABLET | Refills: 11 | Status: SHIPPED | OUTPATIENT
Start: 2020-03-10 | End: 2021-02-22

## 2020-03-10 RX ORDER — ERGOCALCIFEROL 1.25 MG/1
CAPSULE ORAL
Qty: 4 CAPSULE | Refills: 11 | Status: SHIPPED | OUTPATIENT
Start: 2020-03-10 | End: 2021-04-06

## 2020-03-10 RX ORDER — PANTOPRAZOLE SODIUM 40 MG/1
TABLET, DELAYED RELEASE ORAL
Qty: 30 TABLET | Refills: 11 | Status: SHIPPED | OUTPATIENT
Start: 2020-03-10 | End: 2021-02-22

## 2020-03-10 RX ORDER — LISINOPRIL 10 MG/1
TABLET ORAL
Qty: 30 TABLET | Refills: 11 | Status: SHIPPED | OUTPATIENT
Start: 2020-03-10 | End: 2021-02-22

## 2020-03-10 RX ORDER — ESCITALOPRAM OXALATE 20 MG/1
20 TABLET ORAL DAILY
Qty: 30 TABLET | Refills: 11 | Status: SHIPPED | OUTPATIENT
Start: 2020-03-10 | End: 2021-02-22

## 2020-03-10 RX ORDER — BUMETANIDE 2 MG/1
TABLET ORAL
Qty: 30 TABLET | Refills: 11 | Status: SHIPPED | OUTPATIENT
Start: 2020-03-10 | End: 2021-02-22

## 2020-03-10 ASSESSMENT — ENCOUNTER SYMPTOMS
BACK PAIN: 1
EYES NEGATIVE: 1
RESPIRATORY NEGATIVE: 1

## 2020-03-10 ASSESSMENT — PATIENT HEALTH QUESTIONNAIRE - PHQ9
2. FEELING DOWN, DEPRESSED OR HOPELESS: 1
SUM OF ALL RESPONSES TO PHQ QUESTIONS 1-9: 2
1. LITTLE INTEREST OR PLEASURE IN DOING THINGS: 1
SUM OF ALL RESPONSES TO PHQ QUESTIONS 1-9: 2
SUM OF ALL RESPONSES TO PHQ9 QUESTIONS 1 & 2: 2

## 2020-03-10 NOTE — PROGRESS NOTES
motion, tenderness and pain. Skin:     General: Skin is warm and dry. Neurological:      Mental Status: He is alert and oriented to person, place, and time. Psychiatric:         Behavior: Behavior normal.         Thought Content: Thought content normal.         Judgment: Judgment normal.       Results in Past 30 Days  Result Component Current Result Ref Range Previous Result Ref Range   Alb 4.2 (3/10/2020) 3.4 - 4.8 g/dL Not in Time Range    Albumin/Globulin Ratio 1.7 (3/10/2020) 0.8 - 2.0 Not in Time Range    Alkaline Phosphatase 112 (H) (3/10/2020) 25 - 100 U/L Not in Time Range    ALT 23 (3/10/2020) 4 - 36 U/L Not in Time Range    AST 18 (3/10/2020) 8 - 33 U/L Not in Time Range    BUN 14 (3/10/2020) 6 - 20 mg/dL Not in Time Range    Calcium 9.5 (3/10/2020) 8.5 - 10.5 mg/dL Not in Time Range    Chloride 101 (3/10/2020) 98 - 107 mmol/L Not in Time Range    CO2 28 (3/10/2020) 20 - 30 mmol/L Not in Time Range    CREATININE 1.0 (3/10/2020) 0.4 - 1.2 mg/dL Not in Time Range    GFR  >59 (3/10/2020) >59 Not in Time Range    GFR Non- >59 (3/10/2020) >59 Not in Time Range    Globulin 2.5 (3/10/2020) g/dL Not in Time Range    Glucose 108 (H) (3/10/2020) 74 - 106 mg/dL Not in Time Range    Potassium 5.4 (H) (3/10/2020) 3.4 - 5.1 mmol/L Not in Time Range    Sodium 141 (3/10/2020) 136 - 145 mmol/L Not in Time Range    Total Bilirubin 0.4 (3/10/2020) 0.3 - 1.2 mg/dL Not in Time Range    Total Protein 6.7 (3/10/2020) 6.4 - 8.3 g/dL Not in Time Range        Hemoglobin A1C (%)   Date Value   07/15/2019 5.8     LDL Calculated (mg/dL)   Date Value   03/10/2020 115         Lab Results   Component Value Date    WBC 6.3 12/05/2017    NEUTROABS 2.8 12/05/2017    HGB 14.6 12/05/2017    HCT 45.8 12/05/2017    MCV 98.1 12/05/2017     12/05/2017       Lab Results   Component Value Date    TSH 1.52 03/10/2020         ASSESSMENT/PLAN:   Diagnosis Orders   1.  Mixed hyperlipidemia  Comprehensive Metabolic Panel    Lipid Panel    TSH without Reflex   2. Essential hypertension     3. Polyarthralgia     4. Peripheral polyneuropathy     5. Lumbar back pain with radiculopathy affecting right lower extremity     6.  Flu vaccine need  INFLUENZA, QUADV, 0.5ML, 6 MO AND OLDER, IM, PF, PREFILL SYR OR SDV (FLUZONE QUADV, PF)        Orders Placed This Encounter   Procedures    INFLUENZA, QUADV, 0.5ML, 6 MO AND OLDER, IM, PF, PREFILL SYR OR SDV (FLUZONE QUADV, PF)    Comprehensive Metabolic Panel     Standing Status:   Future     Number of Occurrences:   1     Standing Expiration Date:   3/10/2021    Lipid Panel     Standing Status:   Future     Number of Occurrences:   1     Standing Expiration Date:   3/10/2021     Order Specific Question:   Is Patient Fasting?/# of Hours     Answer:   6    TSH without Reflex     Standing Status:   Future     Number of Occurrences:   1     Standing Expiration Date:   3/10/2021        Outpatient Encounter Medications as of 3/10/2020   Medication Sig Dispense Refill    aspirin (ASPIRIN CHILDRENS) 81 MG chewable tablet Take 1 tablet by mouth daily 30 tablet 11    pravastatin (PRAVACHOL) 40 MG tablet take 1 tablet by mouth at bedtime 30 tablet 11    vitamin D (ERGOCALCIFEROL) 1.25 MG (87950 UT) CAPS capsule take 1 capsule by mouth every week 4 capsule 11    rOPINIRole (REQUIP) 2 MG tablet take 1 tablet by mouth at bedtime 30 tablet 11    lisinopril (PRINIVIL;ZESTRIL) 10 MG tablet Take 1 tablet by mouth once daily 30 tablet 11    bumetanide (BUMEX) 2 MG tablet Take one tablet daily by mouth 30 tablet 11    pantoprazole (PROTONIX) 40 MG tablet take 1 tablet by mouth once daily 30 tablet 11    fluticasone (FLONASE) 50 MCG/ACT nasal spray 2 sprays by Nasal route nightly as needed for Rhinitis or Allergies 1 Bottle 11    lactulose (CHRONULAC) 10 GM/15ML solution Take 15 mLs by mouth nightly 3 Bottle 11    escitalopram (LEXAPRO) 20 MG tablet Take 1 tablet by mouth daily 30 tablet 11    oxyCODONE HCl (OXY-IR) 10 MG immediate release tablet Take 10 mg by mouth every 6 hours as needed. 0    [DISCONTINUED] pravastatin (PRAVACHOL) 40 MG tablet take 1 tablet by mouth at bedtime (Patient not taking: Reported on 3/10/2020) 30 tablet 5    [DISCONTINUED] vitamin D (ERGOCALCIFEROL) 1.25 MG (71786 UT) CAPS capsule take 1 capsule by mouth every week (Patient not taking: Reported on 3/10/2020) 4 capsule 5    escitalopram (LEXAPRO) 10 MG tablet take 1 tablet by mouth once daily (Patient not taking: Reported on 3/10/2020) 30 tablet 5    [DISCONTINUED] rOPINIRole (REQUIP) 2 MG tablet take 1 tablet by mouth at bedtime (Patient not taking: Reported on 3/10/2020) 30 tablet 5    [DISCONTINUED] lisinopril (PRINIVIL;ZESTRIL) 10 MG tablet take 1 tablet by mouth once daily (Patient not taking: Reported on 3/10/2020) 30 tablet 5    [DISCONTINUED] bumetanide (BUMEX) 2 MG tablet take 1 tablet by mouth once daily (Patient not taking: Reported on 3/10/2020) 30 tablet 2    [DISCONTINUED] pantoprazole (PROTONIX) 40 MG tablet take 1 tablet by mouth once daily (Patient not taking: Reported on 3/10/2020) 30 tablet 5    [DISCONTINUED] fluticasone (FLONASE) 50 MCG/ACT nasal spray 2 sprays by Nasal route nightly as needed for Rhinitis or Allergies (Patient not taking: Reported on 3/10/2020) 1 Bottle 5    [DISCONTINUED] aspirin (ASPIRIN CHILDRENS) 81 MG chewable tablet Take 1 tablet by mouth daily 30 tablet 11    [DISCONTINUED] lactulose (CHRONULAC) 10 GM/15ML solution Take 15 mLs by mouth nightly (Patient not taking: Reported on 3/10/2020) 3 Bottle 3     No facility-administered encounter medications on file as of 3/10/2020. Return in about 3 months (around 6/10/2020), or if symptoms worsen or fail to improve. PATIENT COUNSELING    Counseling was provided today regardingthe following topics: Healthy eating habits, Regular exercise, substance abuse and healthy sleep habits.     Discussed use, benefit, and

## 2020-03-10 NOTE — PROGRESS NOTES
Chief Complaint   Patient presents with    Hyperlipidemia     f/u    Arthritis     f/u       Have you seen any other physician or provider since your last visit yes - Pain Management    Have you had any other diagnostic tests since your last visit? no    Have you changed or stopped any medications since your last visit? no          Immunizations Administered     Name Date Dose Route    Influenza, Quadv, IM, PF (6 mo and older Fluzone, Flulaval, Fluarix, and 3 yrs and older Afluria) 3/10/2020 0.5 mL Intramuscular    Site: Deltoid- Right    Ariana Kraus    NDC: 14099-616-11

## 2020-03-11 RX ORDER — SODIUM POLYSTYRENE SULFONATE 15 G/60ML
15 SUSPENSION ORAL; RECTAL ONCE
Qty: 1 BOTTLE | Refills: 0 | Status: SHIPPED | OUTPATIENT
Start: 2020-03-11 | End: 2020-06-16 | Stop reason: ALTCHOICE

## 2020-03-20 ENCOUNTER — HOSPITAL ENCOUNTER (OUTPATIENT)
Facility: HOSPITAL | Age: 60
Discharge: HOME | End: 2020-03-20
Payer: MEDICAID

## 2020-03-20 LAB
A/G RATIO: 1.6 (ref 0.8–2)
ALBUMIN SERPL-MCNC: 4.2 G/DL (ref 3.4–4.8)
ALP BLD-CCNC: 105 U/L (ref 25–100)
ALT SERPL-CCNC: 17 U/L (ref 4–36)
ANION GAP SERPL CALCULATED.3IONS-SCNC: 11 MMOL/L (ref 3–16)
AST SERPL-CCNC: 15 U/L (ref 8–33)
BILIRUB SERPL-MCNC: <0.2 MG/DL (ref 0.3–1.2)
BUN BLDV-MCNC: 25 MG/DL (ref 6–20)
CALCIUM SERPL-MCNC: 9.7 MG/DL (ref 8.5–10.5)
CHLORIDE BLD-SCNC: 102 MMOL/L (ref 98–107)
CO2: 27 MMOL/L (ref 20–30)
CREAT SERPL-MCNC: 1.2 MG/DL (ref 0.4–1.2)
GFR AFRICAN AMERICAN: >59
GFR NON-AFRICAN AMERICAN: >59
GLOBULIN: 2.6 G/DL
GLUCOSE BLD-MCNC: 109 MG/DL (ref 74–106)
POTASSIUM SERPL-SCNC: 5.2 MMOL/L (ref 3.4–5.1)
SODIUM BLD-SCNC: 140 MMOL/L (ref 136–145)
TOTAL PROTEIN: 6.8 G/DL (ref 6.4–8.3)

## 2020-03-20 PROCEDURE — 80053 COMPREHEN METABOLIC PANEL: CPT

## 2020-03-20 PROCEDURE — 36415 COLL VENOUS BLD VENIPUNCTURE: CPT

## 2020-06-16 ENCOUNTER — HOSPITAL ENCOUNTER (OUTPATIENT)
Facility: HOSPITAL | Age: 60
Discharge: HOME | End: 2020-06-16
Payer: MEDICAID

## 2020-06-16 ENCOUNTER — OFFICE VISIT (OUTPATIENT)
Dept: FAMILY MEDICINE CLINIC | Age: 60
End: 2020-06-16
Payer: MEDICAID

## 2020-06-16 VITALS
OXYGEN SATURATION: 98 % | HEIGHT: 69 IN | HEART RATE: 82 BPM | DIASTOLIC BLOOD PRESSURE: 80 MMHG | SYSTOLIC BLOOD PRESSURE: 138 MMHG | BODY MASS INDEX: 34.64 KG/M2 | WEIGHT: 233.9 LBS | RESPIRATION RATE: 18 BRPM

## 2020-06-16 LAB
A/G RATIO: 1.8 (ref 0.8–2)
ALBUMIN SERPL-MCNC: 4.2 G/DL (ref 3.4–4.8)
ALP BLD-CCNC: 117 U/L (ref 25–100)
ALT SERPL-CCNC: 24 U/L (ref 4–36)
ANION GAP SERPL CALCULATED.3IONS-SCNC: 14 MMOL/L (ref 3–16)
AST SERPL-CCNC: 18 U/L (ref 8–33)
BILIRUB SERPL-MCNC: 0.3 MG/DL (ref 0.3–1.2)
BUN BLDV-MCNC: 16 MG/DL (ref 6–20)
CALCIUM SERPL-MCNC: 9.4 MG/DL (ref 8.5–10.5)
CHLORIDE BLD-SCNC: 103 MMOL/L (ref 98–107)
CHOLESTEROL, TOTAL: 196 MG/DL (ref 0–200)
CO2: 23 MMOL/L (ref 20–30)
CREAT SERPL-MCNC: 1 MG/DL (ref 0.4–1.2)
GFR AFRICAN AMERICAN: >59
GFR NON-AFRICAN AMERICAN: >59
GLOBULIN: 2.3 G/DL
GLUCOSE BLD-MCNC: 111 MG/DL (ref 74–106)
HDLC SERPL-MCNC: 47 MG/DL (ref 40–60)
LDL CHOLESTEROL CALCULATED: 122 MG/DL
POTASSIUM SERPL-SCNC: 4.6 MMOL/L (ref 3.4–5.1)
SODIUM BLD-SCNC: 140 MMOL/L (ref 136–145)
TOTAL PROTEIN: 6.5 G/DL (ref 6.4–8.3)
TRIGL SERPL-MCNC: 135 MG/DL (ref 0–249)
VLDLC SERPL CALC-MCNC: 27 MG/DL

## 2020-06-16 PROCEDURE — G8427 DOCREV CUR MEDS BY ELIG CLIN: HCPCS | Performed by: NURSE PRACTITIONER

## 2020-06-16 PROCEDURE — 36415 COLL VENOUS BLD VENIPUNCTURE: CPT

## 2020-06-16 PROCEDURE — 80061 LIPID PANEL: CPT

## 2020-06-16 PROCEDURE — 3017F COLORECTAL CA SCREEN DOC REV: CPT | Performed by: NURSE PRACTITIONER

## 2020-06-16 PROCEDURE — 1036F TOBACCO NON-USER: CPT | Performed by: NURSE PRACTITIONER

## 2020-06-16 PROCEDURE — 99214 OFFICE O/P EST MOD 30 MIN: CPT | Performed by: NURSE PRACTITIONER

## 2020-06-16 PROCEDURE — 80053 COMPREHEN METABOLIC PANEL: CPT

## 2020-06-16 PROCEDURE — G8417 CALC BMI ABV UP PARAM F/U: HCPCS | Performed by: NURSE PRACTITIONER

## 2020-06-16 RX ORDER — ROSUVASTATIN CALCIUM 20 MG/1
20 TABLET, COATED ORAL NIGHTLY
Qty: 30 TABLET | Refills: 11 | Status: SHIPPED | OUTPATIENT
Start: 2020-06-16 | End: 2022-03-14 | Stop reason: SDUPTHER

## 2020-06-16 ASSESSMENT — ENCOUNTER SYMPTOMS
RESPIRATORY NEGATIVE: 1
BACK PAIN: 1
GASTROINTESTINAL NEGATIVE: 1
EYES NEGATIVE: 1

## 2020-06-16 NOTE — PROGRESS NOTES
Allergies 1 Bottle 11    lactulose (CHRONULAC) 10 GM/15ML solution Take 15 mLs by mouth nightly 3 Bottle 11    escitalopram (LEXAPRO) 20 MG tablet Take 1 tablet by mouth daily 30 tablet 11    oxyCODONE HCl (OXY-IR) 10 MG immediate release tablet Take 10 mg by mouth every 6 hours as needed. 0    [DISCONTINUED] sodium polystyrene (SPS) 15 GM/60ML suspension Take 60 mLs by mouth once for 1 dose 1 Bottle 0     No facility-administered encounter medications on file as of 6/16/2020. Return in about 3 months (around 9/16/2020), or if symptoms worsen or fail to improve. PATIENT COUNSELING    Counseling was provided today regardingthe following topics: Healthy eating habits, Regular exercise, substance abuse and healthy sleep habits. Discussed use, benefit, and side effectsof prescribed medications. Barriers to medication compliance addressed. All patient questions answered. Pt voiced understanding.

## 2020-09-15 ENCOUNTER — OFFICE VISIT (OUTPATIENT)
Dept: FAMILY MEDICINE CLINIC | Age: 60
End: 2020-09-15
Payer: MEDICAID

## 2020-09-15 ENCOUNTER — HOSPITAL ENCOUNTER (OUTPATIENT)
Facility: HOSPITAL | Age: 60
Discharge: HOME | End: 2020-09-15
Payer: MEDICAID

## 2020-09-15 VITALS
DIASTOLIC BLOOD PRESSURE: 84 MMHG | HEART RATE: 85 BPM | SYSTOLIC BLOOD PRESSURE: 130 MMHG | OXYGEN SATURATION: 96 % | WEIGHT: 236.4 LBS | BODY MASS INDEX: 34.91 KG/M2 | RESPIRATION RATE: 18 BRPM

## 2020-09-15 LAB
REASON FOR REJECTION: NORMAL
REJECTED TEST: NORMAL

## 2020-09-15 PROCEDURE — 3017F COLORECTAL CA SCREEN DOC REV: CPT | Performed by: NURSE PRACTITIONER

## 2020-09-15 PROCEDURE — G8427 DOCREV CUR MEDS BY ELIG CLIN: HCPCS | Performed by: NURSE PRACTITIONER

## 2020-09-15 PROCEDURE — 99214 OFFICE O/P EST MOD 30 MIN: CPT | Performed by: NURSE PRACTITIONER

## 2020-09-15 PROCEDURE — G8417 CALC BMI ABV UP PARAM F/U: HCPCS | Performed by: NURSE PRACTITIONER

## 2020-09-15 PROCEDURE — 1036F TOBACCO NON-USER: CPT | Performed by: NURSE PRACTITIONER

## 2020-09-15 ASSESSMENT — ENCOUNTER SYMPTOMS
EYES NEGATIVE: 1
BACK PAIN: 1
GASTROINTESTINAL NEGATIVE: 1
RESPIRATORY NEGATIVE: 1

## 2020-09-15 NOTE — PROGRESS NOTES
Chief Complaint   Patient presents with    3 Month Follow-Up       Have you seen any other physician or provider since your last visit yes - pain clinic    Have you had any other diagnostic tests since your last visit? no    Have you changed or stopped any medications since your last visit? no     Diabetic retinal exam completed this year?  Yes- Dr. Asiya Arcos in [address] [place] [address]                       * If yes please have patient sign a records release to obtain record to update Health Maintenance                       * If no, please order referral for patient to be scheduled

## 2020-09-15 NOTE — PROGRESS NOTES
SUBJECTIVE:  Alma Ruiz is a 64 y.o. male that presents with   Chief Complaint   Patient presents with    3 Month Follow-Up   . HPI:    Is a 37-year-old white male who presents today for follow-up with hyperlipidemia hypertension back pain. Tells me that he is doing well taking his medications tolerating the medications without problems. Continues to have bilateral pedal edema. He denies chest pain shortness of breath fever chills. Hyperlipidemia   This is a chronic problem. The current episode started more than 1 year ago. The problem is controlled. Recent lipid tests were reviewed and are normal. Factors aggravating his hyperlipidemia include fatty foods. Associated symptoms include leg pain and myalgias. Current antihyperlipidemic treatment includes statins and diet change. The current treatment provides moderate improvement of lipids. Compliance problems include adherence to exercise (very limited for exercise). Risk factors for coronary artery disease include dyslipidemia, family history, male sex, obesity, a sedentary lifestyle and stress. Back Pain   This is a chronic (sees pain management) problem. The current episode started more than 1 year ago. The problem occurs constantly. The pain is present in the lumbar spine, sacro-iliac, gluteal and thoracic spine. The quality of the pain is described as aching, burning, cramping and stabbing. The pain radiates to the left foot, left knee and left thigh. The pain is at a severity of 7/10. The pain is moderate. The pain is the same all the time. The symptoms are aggravated by bending, position and twisting. Stiffness is present all day, at night and in the morning. Associated symptoms include leg pain and paresis. Risk factors include lack of exercise and sedentary lifestyle. He has tried NSAIDs, home exercises, heat and analgesics for the symptoms. The treatment provided mild relief. Hypertension   This is a chronic problem.  The current episode started more than 1 year ago. The problem is unchanged. The problem is controlled. Associated symptoms include peripheral edema. There are no associated agents to hypertension. Risk factors for coronary artery disease include dyslipidemia, family history, male gender, obesity, stress and sedentary lifestyle. Past treatments include ACE inhibitors. The current treatment provides significant improvement. Compliance problems include exercise. Review of Systems   Constitutional: Negative. HENT: Negative. Eyes: Negative. Respiratory: Negative. Cardiovascular: Negative. Gastrointestinal: Negative. Genitourinary: Negative. Musculoskeletal: Positive for arthralgias, back pain, gait problem and myalgias. Skin: Negative. Hematological: Negative. Psychiatric/Behavioral: Negative. All other systems reviewed and are negative. OBJECTIVE:  /84   Pulse 85   Resp 18   Wt 236 lb 6.4 oz (107.2 kg)   SpO2 96% Comment: room air  BMI 34.91 kg/m²   Physical Exam  Vitals signs and nursing note reviewed. Constitutional:       Appearance: Normal appearance. HENT:      Head: Normocephalic and atraumatic. Eyes:      Conjunctiva/sclera: Conjunctivae normal.   Neck:      Musculoskeletal: Normal range of motion and neck supple. Cardiovascular:      Rate and Rhythm: Normal rate and regular rhythm. Pulses: Normal pulses. Heart sounds: Normal heart sounds. Pulmonary:      Effort: Pulmonary effort is normal.      Breath sounds: Normal breath sounds. Musculoskeletal:      Right ankle: He exhibits swelling. Left ankle: He exhibits swelling. Lumbar back: He exhibits decreased range of motion, tenderness and pain. Right lower leg: Edema present. Left lower leg: Edema present. Right foot: Swelling present. Left foot: Swelling present. Skin:     Capillary Refill: Capillary refill takes less than 2 seconds.    Neurological:      Mental Status: He is alert and oriented to person, place, and time. Psychiatric:         Mood and Affect: Mood normal.         Thought Content: Thought content normal.         Judgment: Judgment normal.       No results found for requested labs within last 30 days. Hemoglobin A1C (%)   Date Value   07/15/2019 5.8     LDL Calculated (mg/dL)   Date Value   06/16/2020 122         Lab Results   Component Value Date    WBC 6.3 12/05/2017    NEUTROABS 2.8 12/05/2017    HGB 14.6 12/05/2017    HCT 45.8 12/05/2017    MCV 98.1 12/05/2017     12/05/2017       Lab Results   Component Value Date    TSH 1.52 03/10/2020         ASSESSMENT/PLAN:   Diagnosis Orders   1. Essential hypertension  Comprehensive Metabolic Panel   2. Mixed hyperlipidemia  Lipid Panel    TSH without Reflex   3. Peripheral polyneuropathy     4. Lumbar back pain with radiculopathy affecting right lower extremity     5.  Screening for prostate cancer  Psa screening           Orders Placed This Encounter   Procedures    Lipid Panel     Standing Status:   Future     Number of Occurrences:   1     Standing Expiration Date:   9/15/2021     Order Specific Question:   Is Patient Fasting?/# of Hours     Answer:   6    Comprehensive Metabolic Panel     Standing Status:   Future     Number of Occurrences:   1     Standing Expiration Date:   9/15/2021    TSH without Reflex     Standing Status:   Future     Number of Occurrences:   1     Standing Expiration Date:   9/15/2021    Psa screening     Standing Status:   Future     Number of Occurrences:   1     Standing Expiration Date:   9/15/2021        Outpatient Encounter Medications as of 9/15/2020   Medication Sig Dispense Refill    rosuvastatin (CRESTOR) 20 MG tablet Take 1 tablet by mouth nightly 30 tablet 11    aspirin (ASPIRIN CHILDRENS) 81 MG chewable tablet Take 1 tablet by mouth daily 30 tablet 11    vitamin D (ERGOCALCIFEROL) 1.25 MG (50101 UT) CAPS capsule take 1 capsule by mouth every week 4 capsule 11    rOPINIRole (REQUIP) 2 MG tablet take 1 tablet by mouth at bedtime 30 tablet 11    lisinopril (PRINIVIL;ZESTRIL) 10 MG tablet Take 1 tablet by mouth once daily 30 tablet 11    bumetanide (BUMEX) 2 MG tablet Take one tablet daily by mouth 30 tablet 11    pantoprazole (PROTONIX) 40 MG tablet take 1 tablet by mouth once daily 30 tablet 11    fluticasone (FLONASE) 50 MCG/ACT nasal spray 2 sprays by Nasal route nightly as needed for Rhinitis or Allergies 1 Bottle 11    lactulose (CHRONULAC) 10 GM/15ML solution Take 15 mLs by mouth nightly 3 Bottle 11    escitalopram (LEXAPRO) 20 MG tablet Take 1 tablet by mouth daily 30 tablet 11    oxyCODONE HCl (OXY-IR) 10 MG immediate release tablet Take 10 mg by mouth every 6 hours as needed. 0     No facility-administered encounter medications on file as of 9/15/2020. Return in about 3 months (around 12/15/2020), or if symptoms worsen or fail to improve. PATIENT COUNSELING    Counseling was provided today regardingthe following topics: Healthy eating habits, Regular exercise, substance abuse and healthy sleep habits. Discussed use, benefit, and side effectsof prescribed medications. Barriers to medication compliance addressed. All patient questions answered. Pt voiced understanding.

## 2020-12-14 ENCOUNTER — VIRTUAL VISIT (OUTPATIENT)
Dept: FAMILY MEDICINE CLINIC | Age: 60
End: 2020-12-14
Payer: MEDICAID

## 2020-12-14 PROCEDURE — 99213 OFFICE O/P EST LOW 20 MIN: CPT | Performed by: NURSE PRACTITIONER

## 2020-12-14 PROCEDURE — 3017F COLORECTAL CA SCREEN DOC REV: CPT | Performed by: NURSE PRACTITIONER

## 2020-12-14 PROCEDURE — G8427 DOCREV CUR MEDS BY ELIG CLIN: HCPCS | Performed by: NURSE PRACTITIONER

## 2020-12-14 ASSESSMENT — ENCOUNTER SYMPTOMS
GASTROINTESTINAL NEGATIVE: 1
RESPIRATORY NEGATIVE: 1
BACK PAIN: 1
EYES NEGATIVE: 1

## 2020-12-14 NOTE — PROGRESS NOTES
Chief Complaint   Patient presents with    Hypertension     f/u    Hyperlipidemia     f/u       Have you seen any other physician or provider since your last visit yes - Pain Management    Have you had any other diagnostic tests since your last visit? no    Have you changed or stopped any medications since your last visit? no

## 2020-12-14 NOTE — PROGRESS NOTES
SUBJECTIVE:  Inge Hahn is a 64 y.o. male that presents with   Chief Complaint   Patient presents with    Hypertension     f/u    Hyperlipidemia     f/u   . HPI:    This is a very pleasant 53-year-old white male who presents today for follow-up with hypertension hyperlipidemia and back pain. He goes to pain management for his back pain. Tells me that he is taking all his medications and feeling good he has no complaints at this time is tolerating everything well. He denies chest pain shortness of breath fever or chills. Hypertension  This is a chronic problem. The current episode started more than 1 year ago. The problem is unchanged. The problem is controlled. Associated symptoms include peripheral edema. There are no associated agents to hypertension. Risk factors for coronary artery disease include dyslipidemia, family history, male gender, obesity, stress and sedentary lifestyle. Past treatments include ACE inhibitors. The current treatment provides significant improvement. Compliance problems include exercise. Hyperlipidemia  This is a chronic problem. The current episode started more than 1 year ago. The problem is controlled. Recent lipid tests were reviewed and are normal. Factors aggravating his hyperlipidemia include fatty foods. Associated symptoms include leg pain and myalgias. Current antihyperlipidemic treatment includes statins and diet change. The current treatment provides moderate improvement of lipids. Compliance problems include adherence to exercise (very limited for exercise). Risk factors for coronary artery disease include dyslipidemia, family history, male sex, obesity, a sedentary lifestyle and stress. Back Pain  This is a chronic (sees pain management) problem. The current episode started more than 1 year ago. The problem occurs constantly. The pain is present in the lumbar spine, sacro-iliac, gluteal and thoracic spine.  The quality of the pain is described as aching, burning, cramping and stabbing. The pain radiates to the left foot, left knee and left thigh. The pain is at a severity of 6/10. The pain is moderate. The pain is the same all the time. The symptoms are aggravated by bending, position and twisting. Stiffness is present all day, at night and in the morning. Associated symptoms include leg pain and paresis. Risk factors include lack of exercise and sedentary lifestyle. He has tried NSAIDs, home exercises, heat and analgesics for the symptoms. The treatment provided mild relief. Review of Systems   Constitutional: Negative. HENT: Negative. Eyes: Negative. Respiratory: Negative. Cardiovascular: Negative. Gastrointestinal: Negative. Endocrine: Negative. Genitourinary: Negative. Musculoskeletal: Positive for arthralgias, back pain, gait problem and myalgias. Walks with a cane   Psychiatric/Behavioral: Negative. OBJECTIVE:  There were no vitals taken for this visit. Physical Exam  Constitutional:       Appearance: Normal appearance. HENT:      Head: Normocephalic and atraumatic. Pulmonary:      Effort: Pulmonary effort is normal.   Neurological:      Mental Status: He is alert and oriented to person, place, and time. Psychiatric:         Mood and Affect: Mood normal.         Behavior: Behavior normal.         Thought Content: Thought content normal.         Judgment: Judgment normal.       No results found for requested labs within last 30 days. Hemoglobin A1C (%)   Date Value   07/15/2019 5.8     LDL Calculated (mg/dL)   Date Value   06/16/2020 122         Lab Results   Component Value Date    WBC 6.3 12/05/2017    NEUTROABS 2.8 12/05/2017    HGB 14.6 12/05/2017    HCT 45.8 12/05/2017    MCV 98.1 12/05/2017     12/05/2017       Lab Results   Component Value Date    TSH 1.52 03/10/2020         ASSESSMENT/PLAN:   Diagnosis Orders   1. Essential hypertension     2. Mixed hyperlipidemia     3.  Lumbar back pain with radiculopathy affecting right lower extremity           No orders of the defined types were placed in this encounter. Outpatient Encounter Medications as of 12/14/2020   Medication Sig Dispense Refill    rosuvastatin (CRESTOR) 20 MG tablet Take 1 tablet by mouth nightly 30 tablet 11    aspirin (ASPIRIN CHILDRENS) 81 MG chewable tablet Take 1 tablet by mouth daily 30 tablet 11    vitamin D (ERGOCALCIFEROL) 1.25 MG (08027 UT) CAPS capsule take 1 capsule by mouth every week 4 capsule 11    rOPINIRole (REQUIP) 2 MG tablet take 1 tablet by mouth at bedtime 30 tablet 11    lisinopril (PRINIVIL;ZESTRIL) 10 MG tablet Take 1 tablet by mouth once daily 30 tablet 11    bumetanide (BUMEX) 2 MG tablet Take one tablet daily by mouth 30 tablet 11    pantoprazole (PROTONIX) 40 MG tablet take 1 tablet by mouth once daily 30 tablet 11    fluticasone (FLONASE) 50 MCG/ACT nasal spray 2 sprays by Nasal route nightly as needed for Rhinitis or Allergies 1 Bottle 11    lactulose (CHRONULAC) 10 GM/15ML solution Take 15 mLs by mouth nightly 3 Bottle 11    escitalopram (LEXAPRO) 20 MG tablet Take 1 tablet by mouth daily 30 tablet 11    oxyCODONE HCl (OXY-IR) 10 MG immediate release tablet Take 10 mg by mouth every 6 hours as needed. 0     No facility-administered encounter medications on file as of 12/14/2020. Dorian Ormond is a 64 y.o. male being evaluated by a Virtual Visit (video visit) encounter to address concerns as mentioned above. A caregiver was present when appropriate. Due to this being a TeleHealth encounter (During KTUCD-44 public health emergency), evaluation of the following organ systems was limited: Vitals/Constitutional/EENT/Resp/CV/GI//MS/Neuro/Skin/Heme-Lymph-Imm.   Pursuant to the emergency declaration under the [facility] waiver authority and the [employer] General Act, this Virtual Visit was conducted with patient's (and/or legal guardian's) consent, to reduce the patient's risk of exposure to COVID-19 and provide necessary medical care. The patient (and/or legal guardian) has also been advised to contact this office for worsening conditions or problems, and seek emergency medical treatment and/or call 911 if deemed necessary. Patient identification was verified at the start of the visit: Yes    Total time spent for this encounter: Not billed by time    Services were provided through a video synchronous discussion virtually to substitute for in-person clinic visit. Patient and provider were located at their individual homes. --VITO Dunaway on 12/14/2020 at 9:31 AM    An electronic signature was used to authenticate this note. Return in about 3 months (around 3/14/2021), or if symptoms worsen or fail to improve. PATIENT COUNSELING    Counseling was provided today regardingthe following topics: Healthy eating habits, Regular exercise, substance abuse and healthy sleep habits. Discussed use, benefit, and side effectsof prescribed medications. Barriers to medication compliance addressed. All patient questions answered. Pt voiced understanding.

## 2021-02-22 RX ORDER — PANTOPRAZOLE SODIUM 40 MG/1
TABLET, DELAYED RELEASE ORAL
Qty: 60 TABLET | Refills: 11 | Status: SHIPPED | OUTPATIENT
Start: 2021-02-22 | End: 2022-03-14 | Stop reason: SDUPTHER

## 2021-02-22 RX ORDER — ASPIRIN 81 MG/1
TABLET ORAL
Qty: 60 TABLET | Refills: 11 | Status: SHIPPED | OUTPATIENT
Start: 2021-02-22 | End: 2022-03-14 | Stop reason: SDUPTHER

## 2021-02-22 RX ORDER — BUMETANIDE 2 MG/1
TABLET ORAL
Qty: 60 TABLET | Refills: 11 | Status: SHIPPED | OUTPATIENT
Start: 2021-02-22 | End: 2022-03-14 | Stop reason: SDUPTHER

## 2021-02-22 RX ORDER — CHOLECALCIFEROL (VITAMIN D3) 1250 MCG
CAPSULE ORAL
Qty: 8 CAPSULE | Refills: 11 | Status: SHIPPED | OUTPATIENT
Start: 2021-02-22 | End: 2022-03-14 | Stop reason: SDUPTHER

## 2021-02-22 RX ORDER — ESCITALOPRAM OXALATE 20 MG/1
20 TABLET ORAL DAILY
Qty: 60 TABLET | Refills: 11 | Status: SHIPPED | OUTPATIENT
Start: 2021-02-22 | End: 2022-03-14 | Stop reason: SDUPTHER

## 2021-02-22 RX ORDER — ROPINIROLE 2 MG/1
TABLET, FILM COATED ORAL
Qty: 60 TABLET | Refills: 11 | Status: SHIPPED | OUTPATIENT
Start: 2021-02-22 | End: 2022-03-14 | Stop reason: SDUPTHER

## 2021-02-22 RX ORDER — LISINOPRIL 10 MG/1
TABLET ORAL
Qty: 60 TABLET | Refills: 11 | Status: SHIPPED | OUTPATIENT
Start: 2021-02-22 | End: 2021-04-20

## 2021-02-22 NOTE — TELEPHONE ENCOUNTER
Refill request received from pharmacy.  Medication pending for approval.       Last Office Visit With PCP:  12/14/2020    Next Visit Date:  Future Appointments   Date Time Provider Octavio Camargo   3/11/2021  9:15 AM VITO Jeff [address] [facility]

## 2021-03-23 ENCOUNTER — OFFICE VISIT (OUTPATIENT)
Dept: FAMILY MEDICINE CLINIC | Age: 61
End: 2021-03-23
Payer: MEDICAID

## 2021-03-23 ENCOUNTER — HOSPITAL ENCOUNTER (OUTPATIENT)
Facility: HOSPITAL | Age: 61
Discharge: HOME | End: 2021-03-23
Payer: MEDICAID

## 2021-03-23 VITALS
DIASTOLIC BLOOD PRESSURE: 88 MMHG | TEMPERATURE: 97.9 F | OXYGEN SATURATION: 98 % | BODY MASS INDEX: 35.55 KG/M2 | RESPIRATION RATE: 18 BRPM | WEIGHT: 240 LBS | HEIGHT: 69 IN | HEART RATE: 72 BPM | SYSTOLIC BLOOD PRESSURE: 148 MMHG

## 2021-03-23 DIAGNOSIS — G62.9 PERIPHERAL POLYNEUROPATHY: ICD-10-CM

## 2021-03-23 DIAGNOSIS — I10 ESSENTIAL HYPERTENSION: Primary | ICD-10-CM

## 2021-03-23 DIAGNOSIS — E78.2 MIXED HYPERLIPIDEMIA: ICD-10-CM

## 2021-03-23 DIAGNOSIS — R73.03 PREDIABETES: ICD-10-CM

## 2021-03-23 DIAGNOSIS — M54.16 LUMBAR BACK PAIN WITH RADICULOPATHY AFFECTING RIGHT LOWER EXTREMITY: ICD-10-CM

## 2021-03-23 DIAGNOSIS — I10 ESSENTIAL HYPERTENSION: ICD-10-CM

## 2021-03-23 LAB
A/G RATIO: 1.5 (ref 0.8–2)
ALBUMIN SERPL-MCNC: 4.3 G/DL (ref 3.4–4.8)
ALP BLD-CCNC: 139 U/L (ref 25–100)
ALT SERPL-CCNC: 32 U/L (ref 4–36)
ANION GAP SERPL CALCULATED.3IONS-SCNC: 10 MMOL/L (ref 3–16)
AST SERPL-CCNC: 21 U/L (ref 8–33)
BILIRUB SERPL-MCNC: 0.4 MG/DL (ref 0.3–1.2)
BUN BLDV-MCNC: 23 MG/DL (ref 6–20)
CALCIUM SERPL-MCNC: 9.9 MG/DL (ref 8.5–10.5)
CHLORIDE BLD-SCNC: 102 MMOL/L (ref 98–107)
CHOLESTEROL, TOTAL: 230 MG/DL (ref 0–200)
CO2: 30 MMOL/L (ref 20–30)
CREAT SERPL-MCNC: 1 MG/DL (ref 0.4–1.2)
GFR AFRICAN AMERICAN: >59
GFR NON-AFRICAN AMERICAN: >59
GLOBULIN: 2.8 G/DL
GLUCOSE BLD-MCNC: 114 MG/DL (ref 74–106)
HBA1C MFR BLD: 5.6 %
HDLC SERPL-MCNC: 46 MG/DL (ref 40–60)
LDL CHOLESTEROL CALCULATED: 142 MG/DL
POTASSIUM SERPL-SCNC: 6 MMOL/L (ref 3.4–5.1)
SODIUM BLD-SCNC: 142 MMOL/L (ref 136–145)
TOTAL PROTEIN: 7.1 G/DL (ref 6.4–8.3)
TRIGL SERPL-MCNC: 211 MG/DL (ref 0–249)
VLDLC SERPL CALC-MCNC: 42 MG/DL

## 2021-03-23 PROCEDURE — 80061 LIPID PANEL: CPT

## 2021-03-23 PROCEDURE — G8427 DOCREV CUR MEDS BY ELIG CLIN: HCPCS | Performed by: NURSE PRACTITIONER

## 2021-03-23 PROCEDURE — 80053 COMPREHEN METABOLIC PANEL: CPT

## 2021-03-23 PROCEDURE — 83036 HEMOGLOBIN GLYCOSYLATED A1C: CPT

## 2021-03-23 PROCEDURE — G8484 FLU IMMUNIZE NO ADMIN: HCPCS | Performed by: NURSE PRACTITIONER

## 2021-03-23 PROCEDURE — 1036F TOBACCO NON-USER: CPT | Performed by: NURSE PRACTITIONER

## 2021-03-23 PROCEDURE — 99214 OFFICE O/P EST MOD 30 MIN: CPT | Performed by: NURSE PRACTITIONER

## 2021-03-23 PROCEDURE — G8417 CALC BMI ABV UP PARAM F/U: HCPCS | Performed by: NURSE PRACTITIONER

## 2021-03-23 PROCEDURE — 3017F COLORECTAL CA SCREEN DOC REV: CPT | Performed by: NURSE PRACTITIONER

## 2021-03-23 RX ORDER — LATANOPROST 50 UG/ML
SOLUTION/ DROPS OPHTHALMIC
COMMUNITY
Start: 2021-03-02

## 2021-03-23 RX ORDER — BRIMONIDINE TARTRATE 2 MG/ML
SOLUTION/ DROPS OPHTHALMIC
COMMUNITY
Start: 2021-03-02

## 2021-03-23 ASSESSMENT — PATIENT HEALTH QUESTIONNAIRE - PHQ9
1. LITTLE INTEREST OR PLEASURE IN DOING THINGS: 0
SUM OF ALL RESPONSES TO PHQ9 QUESTIONS 1 & 2: 0
SUM OF ALL RESPONSES TO PHQ QUESTIONS 1-9: 0
SUM OF ALL RESPONSES TO PHQ QUESTIONS 1-9: 0

## 2021-03-23 ASSESSMENT — ENCOUNTER SYMPTOMS
RESPIRATORY NEGATIVE: 1
BACK PAIN: 1
GASTROINTESTINAL NEGATIVE: 1
EYES NEGATIVE: 1

## 2021-03-23 NOTE — PROGRESS NOTES
episode started more than 1 year ago. The problem occurs constantly. The problem is unchanged. The pain is present in the lumbar spine, sacro-iliac, gluteal and thoracic spine. The quality of the pain is described as aching, burning, cramping and stabbing. The pain radiates to the left foot, left knee and left thigh. The pain is at a severity of 6/10. The pain is moderate. The pain is the same all the time. The symptoms are aggravated by bending, position and twisting. Stiffness is present all day, at night and in the morning. Associated symptoms include leg pain and paresis. Risk factors include lack of exercise, sedentary lifestyle and obesity. He has tried NSAIDs, home exercises, heat and analgesics (oxycodone per pain management) for the symptoms. The treatment provided moderate relief. Review of Systems   Constitutional: Negative. HENT: Negative. Eyes: Negative. Respiratory: Negative. Cardiovascular: Positive for leg swelling. Gastrointestinal: Negative. Endocrine: Negative. Genitourinary: Negative. Musculoskeletal: Positive for arthralgias, back pain and myalgias. Neurological: Negative. Hematological: Negative. Psychiatric/Behavioral: Negative. All other systems reviewed and are negative. OBJECTIVE:  BP (!) 148/88   Pulse 72   Temp 97.9 °F (36.6 °C) (Temporal)   Resp 18   Ht 5' 9\" (1.753 m)   Wt 240 lb (108.9 kg)   SpO2 98% Comment: room air  BMI 35.44 kg/m²   Physical Exam  Vitals signs and nursing note reviewed. Constitutional:       Appearance: Normal appearance. He is obese. HENT:      Head: Normocephalic and atraumatic. Eyes:      Conjunctiva/sclera: Conjunctivae normal.   Neck:      Musculoskeletal: Normal range of motion and neck supple. Cardiovascular:      Rate and Rhythm: Normal rate and regular rhythm. Pulses: Normal pulses. Heart sounds: Normal heart sounds.    Pulmonary:      Effort: Pulmonary effort is normal.      Breath sounds: Normal breath sounds. Musculoskeletal:      Lumbar back: He exhibits decreased range of motion, tenderness and pain. Right lower leg: Edema present. Left lower leg: Edema present. Skin:     General: Skin is warm and dry. Capillary Refill: Capillary refill takes less than 2 seconds. Neurological:      Mental Status: He is alert and oriented to person, place, and time. Psychiatric:         Mood and Affect: Mood normal.         Behavior: Behavior normal.         Thought Content: Thought content normal.         Judgment: Judgment normal.       No results found for requested labs within last 30 days. Hemoglobin A1C (%)   Date Value   07/15/2019 5.8     LDL Calculated (mg/dL)   Date Value   06/16/2020 122         Lab Results   Component Value Date    WBC 6.3 12/05/2017    NEUTROABS 2.8 12/05/2017    HGB 14.6 12/05/2017    HCT 45.8 12/05/2017    MCV 98.1 12/05/2017     12/05/2017       Lab Results   Component Value Date    TSH 1.52 03/10/2020         ASSESSMENT/PLAN:   Diagnosis Orders   1. Essential hypertension  Comprehensive Metabolic Panel   2. Mixed hyperlipidemia  Lipid Panel   3. Lumbar back pain with radiculopathy affecting right lower extremity     4. Peripheral polyneuropathy     5.  Prediabetes  Hemoglobin A1C           Orders Placed This Encounter   Procedures    Lipid Panel     Standing Status:   Future     Standing Expiration Date:   3/23/2022     Order Specific Question:   Is Patient Fasting?/# of Hours     Answer:   6    Hemoglobin A1C     Standing Status:   Future     Standing Expiration Date:   3/23/2022    Comprehensive Metabolic Panel     Standing Status:   Future     Standing Expiration Date:   3/23/2022        Outpatient Encounter Medications as of 3/23/2021   Medication Sig Dispense Refill    brimonidine (ALPHAGAN) 0.2 % ophthalmic solution       latanoprost (XALATAN) 0.005 % ophthalmic solution       lisinopril (PRINIVIL;ZESTRIL) 10 MG tablet TAKE ONE TABLET BY MOUTH DAILY FOR BLOOD PRESSURE 60 tablet 11    Cholecalciferol (VITAMIN D3) 1.25 MG (72818 UT) CAPS TAKE 1 CAPSULE BY MOUTH EVERY WEEK 8 capsule 11    escitalopram (LEXAPRO) 20 MG tablet TAKE 1 TABLET BY MOUTH DAILY 60 tablet 11    bumetanide (BUMEX) 2 MG tablet TAKE ONE TABLET BY MOUTH EVERY MORNING 60 tablet 11    rOPINIRole (REQUIP) 2 MG tablet TAKE 1 TABLET BY MOUTH AT BEDTIME FOR RESTLESS LEGS 60 tablet 11    ASPIRIN ADULT LOW STRENGTH 81 MG EC tablet TAKE 1 TABLET BY MOUTH DAILY 60 tablet 11    pantoprazole (PROTONIX) 40 MG tablet TAKE ONE TABLET BY MOUTH DAILY FOR STOMACH 60 tablet 11    rosuvastatin (CRESTOR) 20 MG tablet Take 1 tablet by mouth nightly 30 tablet 11    fluticasone (FLONASE) 50 MCG/ACT nasal spray 2 sprays by Nasal route nightly as needed for Rhinitis or Allergies 1 Bottle 11    lactulose (CHRONULAC) 10 GM/15ML solution Take 15 mLs by mouth nightly 3 Bottle 11    oxyCODONE HCl (OXY-IR) 10 MG immediate release tablet Take 10 mg by mouth every 6 hours as needed. 0    [DISCONTINUED] vitamin D (ERGOCALCIFEROL) 1.25 MG (89587 UT) CAPS capsule take 1 capsule by mouth every week 4 capsule 11     No facility-administered encounter medications on file as of 3/23/2021. Return in about 6 months (around 9/23/2021), or if symptoms worsen or fail to improve. PATIENT COUNSELING    Counseling was provided today regardingthe following topics: Healthy eating habits, Regular exercise, substance abuse and healthy sleep habits. Discussed use, benefit, and side effectsof prescribed medications. Barriers to medication compliance addressed. All patient questions answered. Pt voiced understanding.

## 2021-03-24 DIAGNOSIS — E87.5 HYPERKALEMIA: Primary | ICD-10-CM

## 2021-03-24 RX ORDER — SODIUM POLYSTYRENE SULFONATE 15 G/60ML
30 SUSPENSION ORAL; RECTAL ONCE
Qty: 1 BOTTLE | Refills: 0 | Status: SHIPPED | OUTPATIENT
Start: 2021-03-24 | End: 2021-04-06 | Stop reason: ALTCHOICE

## 2021-03-24 RX ORDER — ATORVASTATIN CALCIUM 20 MG/1
20 TABLET, FILM COATED ORAL DAILY
Qty: 30 TABLET | Refills: 5 | Status: SHIPPED | OUTPATIENT
Start: 2021-03-24 | End: 2022-03-14 | Stop reason: SDUPTHER

## 2021-03-29 ENCOUNTER — HOSPITAL ENCOUNTER (OUTPATIENT)
Facility: HOSPITAL | Age: 61
Discharge: HOME | End: 2021-03-29
Payer: MEDICAID

## 2021-03-29 DIAGNOSIS — E87.5 HYPERKALEMIA: Primary | ICD-10-CM

## 2021-03-29 DIAGNOSIS — E87.5 HYPERKALEMIA: ICD-10-CM

## 2021-03-29 LAB — POTASSIUM SERPL-SCNC: 5.2 MMOL/L (ref 3.4–5.1)

## 2021-03-29 PROCEDURE — 84132 ASSAY OF SERUM POTASSIUM: CPT

## 2021-04-06 ENCOUNTER — OFFICE VISIT (OUTPATIENT)
Dept: FAMILY MEDICINE CLINIC | Age: 61
End: 2021-04-06
Payer: MEDICAID

## 2021-04-06 ENCOUNTER — HOSPITAL ENCOUNTER (OUTPATIENT)
Facility: HOSPITAL | Age: 61
Discharge: HOME | End: 2021-04-06
Payer: MEDICAID

## 2021-04-06 VITALS
TEMPERATURE: 98.6 F | WEIGHT: 243.4 LBS | HEART RATE: 78 BPM | DIASTOLIC BLOOD PRESSURE: 92 MMHG | SYSTOLIC BLOOD PRESSURE: 178 MMHG | RESPIRATION RATE: 18 BRPM | OXYGEN SATURATION: 98 % | HEIGHT: 69 IN | BODY MASS INDEX: 36.05 KG/M2

## 2021-04-06 DIAGNOSIS — E87.5 HYPERKALEMIA: Primary | ICD-10-CM

## 2021-04-06 DIAGNOSIS — I10 ESSENTIAL HYPERTENSION: Primary | ICD-10-CM

## 2021-04-06 DIAGNOSIS — E87.5 HYPERKALEMIA: ICD-10-CM

## 2021-04-06 LAB
ANION GAP SERPL CALCULATED.3IONS-SCNC: 11 MMOL/L (ref 3–16)
BUN BLDV-MCNC: 14 MG/DL (ref 6–20)
CALCIUM SERPL-MCNC: 10.2 MG/DL (ref 8.5–10.5)
CHLORIDE BLD-SCNC: 105 MMOL/L (ref 98–107)
CO2: 35 MMOL/L (ref 20–30)
CREAT SERPL-MCNC: 1 MG/DL (ref 0.4–1.2)
GFR AFRICAN AMERICAN: >59
GFR NON-AFRICAN AMERICAN: >59
GLUCOSE BLD-MCNC: 111 MG/DL (ref 74–106)
POTASSIUM SERPL-SCNC: 5.7 MMOL/L (ref 3.4–5.1)
SODIUM BLD-SCNC: 151 MMOL/L (ref 136–145)

## 2021-04-06 PROCEDURE — G8417 CALC BMI ABV UP PARAM F/U: HCPCS | Performed by: NURSE PRACTITIONER

## 2021-04-06 PROCEDURE — 80048 BASIC METABOLIC PNL TOTAL CA: CPT

## 2021-04-06 PROCEDURE — G8427 DOCREV CUR MEDS BY ELIG CLIN: HCPCS | Performed by: NURSE PRACTITIONER

## 2021-04-06 PROCEDURE — 3017F COLORECTAL CA SCREEN DOC REV: CPT | Performed by: NURSE PRACTITIONER

## 2021-04-06 PROCEDURE — 99213 OFFICE O/P EST LOW 20 MIN: CPT | Performed by: NURSE PRACTITIONER

## 2021-04-06 PROCEDURE — 1036F TOBACCO NON-USER: CPT | Performed by: NURSE PRACTITIONER

## 2021-04-06 RX ORDER — SODIUM POLYSTYRENE SULFONATE 15 G/60ML
30 SUSPENSION ORAL; RECTAL ONCE
Qty: 1 BOTTLE | Refills: 0 | Status: SHIPPED | OUTPATIENT
Start: 2021-04-06 | End: 2021-04-20

## 2021-04-06 RX ORDER — METOPROLOL SUCCINATE 50 MG/1
50 TABLET, EXTENDED RELEASE ORAL DAILY
Qty: 30 TABLET | Refills: 3 | Status: SHIPPED | OUTPATIENT
Start: 2021-04-06 | End: 2022-03-14 | Stop reason: SDUPTHER

## 2021-04-06 ASSESSMENT — ENCOUNTER SYMPTOMS
RESPIRATORY NEGATIVE: 1
EYES NEGATIVE: 1
BACK PAIN: 1
GASTROINTESTINAL NEGATIVE: 1

## 2021-04-06 NOTE — PROGRESS NOTES
Rhythm: Normal rate and regular rhythm. Pulses: Normal pulses. Heart sounds: Normal heart sounds. Pulmonary:      Effort: Pulmonary effort is normal.      Breath sounds: Normal breath sounds. Musculoskeletal:      Lumbar back: He exhibits decreased range of motion, tenderness and pain. Right lower leg: Edema present. Left lower leg: Edema present. Skin:     General: Skin is warm and dry. Capillary Refill: Capillary refill takes less than 2 seconds. Neurological:      Mental Status: He is alert and oriented to person, place, and time. Psychiatric:         Mood and Affect: Mood normal.         Behavior: Behavior normal.         Thought Content:  Thought content normal.         Judgment: Judgment normal.       Results in Past 30 Days  Result Component Current Result Ref Range Previous Result Ref Range   Albumin/Globulin Ratio 1.5 (3/23/2021) 0.8 - 2.0 Not in Time Range    Albumin 4.3 (3/23/2021) 3.4 - 4.8 g/dL Not in Time Range    Alkaline Phosphatase 139 (H) (3/23/2021) 25 - 100 U/L Not in Time Range    ALT 32 (3/23/2021) 4 - 36 U/L Not in Time Range    AST 21 (3/23/2021) 8 - 33 U/L Not in Time Range    BUN 23 (H) (3/23/2021) 6 - 20 mg/dL Not in Time Range    Calcium 9.9 (3/23/2021) 8.5 - 10.5 mg/dL Not in Time Range    Chloride 102 (3/23/2021) 98 - 107 mmol/L Not in Time Range    CO2 30 (3/23/2021) 20 - 30 mmol/L Not in Time Range    CREATININE 1.0 (3/23/2021) 0.4 - 1.2 mg/dL Not in Time Range    GFR  >59 (3/23/2021) >59 Not in Time Range    GFR Non- >59 (3/23/2021) >59 Not in Time Range    Globulin 2.8 (3/23/2021) g/dL Not in Time Range    Glucose 114 (H) (3/23/2021) 74 - 106 mg/dL Not in Time Range    Potassium 5.2 (H) (3/29/2021) 3.4 - 5.1 mmol/L 6.0 (H) (3/23/2021) 3.4 - 5.1 mmol/L   Sodium 142 (3/23/2021) 136 - 145 mmol/L Not in Time Range    Total Bilirubin 0.4 (3/23/2021) 0.3 - 1.2 mg/dL Not in Time Range    Total Protein 7.1 (3/23/2021) 6.4 - 8.3 g/dL Not in Time Range        Hemoglobin A1C (%)   Date Value   03/23/2021 5.6     LDL Calculated (mg/dL)   Date Value   03/23/2021 142 (H)         Lab Results   Component Value Date    WBC 6.3 12/05/2017    NEUTROABS 2.8 12/05/2017    HGB 14.6 12/05/2017    HCT 45.8 12/05/2017    MCV 98.1 12/05/2017     12/05/2017       Lab Results   Component Value Date    TSH 1.52 03/10/2020         ASSESSMENT/PLAN:   Diagnosis Orders   1. Essential hypertension     2.  Hyperkalemia  Basic Metabolic Panel           Orders Placed This Encounter   Procedures    Basic Metabolic Panel     Standing Status:   Future     Standing Expiration Date:   4/6/2022        Outpatient Encounter Medications as of 4/6/2021   Medication Sig Dispense Refill    metoprolol succinate (TOPROL XL) 50 MG extended release tablet Take 1 tablet by mouth daily 30 tablet 3    atorvastatin (LIPITOR) 20 MG tablet Take 1 tablet by mouth daily 30 tablet 5    brimonidine (ALPHAGAN) 0.2 % ophthalmic solution       latanoprost (XALATAN) 0.005 % ophthalmic solution       lisinopril (PRINIVIL;ZESTRIL) 10 MG tablet TAKE ONE TABLET BY MOUTH DAILY FOR BLOOD PRESSURE 60 tablet 11    Cholecalciferol (VITAMIN D3) 1.25 MG (20616 UT) CAPS TAKE 1 CAPSULE BY MOUTH EVERY WEEK 8 capsule 11    escitalopram (LEXAPRO) 20 MG tablet TAKE 1 TABLET BY MOUTH DAILY 60 tablet 11    bumetanide (BUMEX) 2 MG tablet TAKE ONE TABLET BY MOUTH EVERY MORNING 60 tablet 11    rOPINIRole (REQUIP) 2 MG tablet TAKE 1 TABLET BY MOUTH AT BEDTIME FOR RESTLESS LEGS 60 tablet 11    ASPIRIN ADULT LOW STRENGTH 81 MG EC tablet TAKE 1 TABLET BY MOUTH DAILY 60 tablet 11    pantoprazole (PROTONIX) 40 MG tablet TAKE ONE TABLET BY MOUTH DAILY FOR STOMACH 60 tablet 11    rosuvastatin (CRESTOR) 20 MG tablet Take 1 tablet by mouth nightly 30 tablet 11    fluticasone (FLONASE) 50 MCG/ACT nasal spray 2 sprays by Nasal route nightly as needed for Rhinitis or Allergies 1 Bottle 11    lactulose (CHRONULAC) 10 GM/15ML solution Take 15 mLs by mouth nightly 3 Bottle 11    [DISCONTINUED] vitamin D (ERGOCALCIFEROL) 1.25 MG (46389 UT) CAPS capsule take 1 capsule by mouth every week 4 capsule 11    oxyCODONE HCl (OXY-IR) 10 MG immediate release tablet Take 10 mg by mouth every 6 hours as needed. 0    [DISCONTINUED] sodium polystyrene (SPS) 15 GM/60ML suspension Take 120 mLs by mouth once for 1 dose 1 Bottle 0     No facility-administered encounter medications on file as of 4/6/2021. Return in about 2 weeks (around 4/20/2021), or if symptoms worsen or fail to improve. PATIENT COUNSELING    Counseling was provided today regardingthe following topics: Healthy eating habits, Regular exercise, substance abuse and healthy sleep habits. Discussed use, benefit, and side effectsof prescribed medications. Barriers to medication compliance addressed. All patient questions answered. Pt voiced understanding.

## 2021-04-06 NOTE — PROGRESS NOTES
Chief Complaint   Patient presents with    Hypertension     f/u       Have you seen any other physician or provider since your last visit no    Have you had any other diagnostic tests since your last visit? no    Have you changed or stopped any medications since your last visit? no

## 2021-04-20 ENCOUNTER — HOSPITAL ENCOUNTER (OUTPATIENT)
Facility: HOSPITAL | Age: 61
Discharge: HOME | End: 2021-04-20
Payer: MEDICAID

## 2021-04-20 ENCOUNTER — OFFICE VISIT (OUTPATIENT)
Dept: FAMILY MEDICINE CLINIC | Age: 61
End: 2021-04-20
Payer: MEDICAID

## 2021-04-20 VITALS
BODY MASS INDEX: 35.34 KG/M2 | WEIGHT: 238.6 LBS | SYSTOLIC BLOOD PRESSURE: 156 MMHG | HEART RATE: 77 BPM | HEIGHT: 69 IN | DIASTOLIC BLOOD PRESSURE: 98 MMHG | TEMPERATURE: 96.6 F | OXYGEN SATURATION: 98 % | RESPIRATION RATE: 18 BRPM

## 2021-04-20 DIAGNOSIS — E87.5 HYPERKALEMIA: ICD-10-CM

## 2021-04-20 DIAGNOSIS — E87.5 HYPERKALEMIA: Primary | ICD-10-CM

## 2021-04-20 LAB
A/G RATIO: 1.6 (ref 0.8–2)
ALBUMIN SERPL-MCNC: 4.1 G/DL (ref 3.4–4.8)
ALP BLD-CCNC: 140 U/L (ref 25–100)
ALT SERPL-CCNC: 25 U/L (ref 4–36)
ANION GAP SERPL CALCULATED.3IONS-SCNC: 9 MMOL/L (ref 3–16)
AST SERPL-CCNC: 19 U/L (ref 8–33)
BILIRUB SERPL-MCNC: 0.3 MG/DL (ref 0.3–1.2)
BUN BLDV-MCNC: 17 MG/DL (ref 6–20)
CALCIUM SERPL-MCNC: 9.9 MG/DL (ref 8.5–10.5)
CHLORIDE BLD-SCNC: 105 MMOL/L (ref 98–107)
CO2: 31 MMOL/L (ref 20–30)
CREAT SERPL-MCNC: 1 MG/DL (ref 0.4–1.2)
GFR AFRICAN AMERICAN: >59
GFR NON-AFRICAN AMERICAN: >59
GLOBULIN: 2.6 G/DL
GLUCOSE BLD-MCNC: 104 MG/DL (ref 74–106)
POTASSIUM SERPL-SCNC: 5.6 MMOL/L (ref 3.4–5.1)
SODIUM BLD-SCNC: 145 MMOL/L (ref 136–145)
TOTAL PROTEIN: 6.7 G/DL (ref 6.4–8.3)

## 2021-04-20 PROCEDURE — G8427 DOCREV CUR MEDS BY ELIG CLIN: HCPCS | Performed by: NURSE PRACTITIONER

## 2021-04-20 PROCEDURE — 1036F TOBACCO NON-USER: CPT | Performed by: NURSE PRACTITIONER

## 2021-04-20 PROCEDURE — G8417 CALC BMI ABV UP PARAM F/U: HCPCS | Performed by: NURSE PRACTITIONER

## 2021-04-20 PROCEDURE — 99213 OFFICE O/P EST LOW 20 MIN: CPT | Performed by: NURSE PRACTITIONER

## 2021-04-20 PROCEDURE — 80053 COMPREHEN METABOLIC PANEL: CPT

## 2021-04-20 PROCEDURE — 82088 ASSAY OF ALDOSTERONE: CPT

## 2021-04-20 PROCEDURE — 3017F COLORECTAL CA SCREEN DOC REV: CPT | Performed by: NURSE PRACTITIONER

## 2021-04-20 RX ORDER — SODIUM POLYSTYRENE SULFONATE 15 G/60ML
30 SUSPENSION ORAL; RECTAL ONCE
Qty: 1 BOTTLE | Refills: 0 | Status: SHIPPED | OUTPATIENT
Start: 2021-04-20 | End: 2021-12-13

## 2021-04-20 ASSESSMENT — ENCOUNTER SYMPTOMS
BACK PAIN: 1
EYES NEGATIVE: 1
RESPIRATORY NEGATIVE: 1
GASTROINTESTINAL NEGATIVE: 1

## 2021-04-20 NOTE — PROGRESS NOTES
SUBJECTIVE:  Dwayne Stallings is a 64 y.o. male that presents with   Chief Complaint   Patient presents with    Hypertension     f/u   . HPI:    This is a very pleasant 61-year-old white male who presents today for follow-up with hyperkalemia. We have stopped his ACE inhibitor and are checking today to see if this is made a difference on his potassium. His potassium has been elevated for the past several years every time that I draw labs on him I have to give him Kayexalate to bring it down. He is on Lasix without potassium he is also on lactulose which has a side effect of possible hypokalemia and he was taken the lisinopril which could have caused hyperkalemia so that is why we stopped it. I am going to order a aldosterone and renin level on him today in addition to a CMP and then make a referral to nephrology even though his renal function is normal.  Blood pressure was elevated since stopping the lisinopril I did put him on Toprol he is taking that but his blood pressure was still elevated. Review of Systems   Constitutional: Negative. HENT: Negative. Eyes: Negative. Respiratory: Negative. Cardiovascular: Positive for leg swelling. Gastrointestinal: Negative. Endocrine: Negative. Genitourinary: Negative. Musculoskeletal: Positive for arthralgias, back pain and myalgias. Skin: Negative. Neurological: Negative. Hematological: Negative. Psychiatric/Behavioral: Negative. All other systems reviewed and are negative. OBJECTIVE:  BP (!) 156/98   Pulse 77   Temp 96.6 °F (35.9 °C) (Temporal)   Resp 18   Ht 5' 9\" (1.753 m)   Wt 238 lb 9.6 oz (108.2 kg)   SpO2 98% Comment: room air  BMI 35.24 kg/m²   Physical Exam  Vitals signs and nursing note reviewed. Constitutional:       Appearance: Normal appearance. He is obese. HENT:      Head: Normocephalic and atraumatic.    Eyes:      Conjunctiva/sclera: Conjunctivae normal.   Neck:      Musculoskeletal: Normal range of motion and neck supple. Cardiovascular:      Rate and Rhythm: Normal rate and regular rhythm. Pulses: Normal pulses. Heart sounds: Normal heart sounds. Pulmonary:      Effort: Pulmonary effort is normal.      Breath sounds: Normal breath sounds. Musculoskeletal:      Lumbar back: He exhibits decreased range of motion, tenderness and pain. Right lower leg: Edema present. Left lower leg: Edema present. Skin:     General: Skin is warm and dry. Capillary Refill: Capillary refill takes less than 2 seconds. Neurological:      Mental Status: He is alert and oriented to person, place, and time. Psychiatric:         Mood and Affect: Mood normal.         Behavior: Behavior normal.         Thought Content:  Thought content normal.         Judgment: Judgment normal.       Results in Past 30 Days  Result Component Current Result Ref Range Previous Result Ref Range   Albumin/Globulin Ratio 1.5 (3/23/2021) 0.8 - 2.0 Not in Time Range    Albumin 4.3 (3/23/2021) 3.4 - 4.8 g/dL Not in Time Range    Alkaline Phosphatase 139 (H) (3/23/2021) 25 - 100 U/L Not in Time Range    ALT 32 (3/23/2021) 4 - 36 U/L Not in Time Range    AST 21 (3/23/2021) 8 - 33 U/L Not in Time Range    BUN 14 (4/6/2021) 6 - 20 mg/dL 23 (H) (3/23/2021) 6 - 20 mg/dL   Calcium 10.2 (4/6/2021) 8.5 - 10.5 mg/dL 9.9 (3/23/2021) 8.5 - 10.5 mg/dL   Chloride 105 (4/6/2021) 98 - 107 mmol/L 102 (3/23/2021) 98 - 107 mmol/L   CO2 35 (H) (4/6/2021) 20 - 30 mmol/L 30 (3/23/2021) 20 - 30 mmol/L   CREATININE 1.0 (4/6/2021) 0.4 - 1.2 mg/dL 1.0 (3/23/2021) 0.4 - 1.2 mg/dL   GFR  >59 (4/6/2021) >59 >59 (3/23/2021) >59   GFR Non- >59 (4/6/2021) >59 >59 (3/23/2021) >59   Globulin 2.8 (3/23/2021) g/dL Not in Time Range    Glucose 111 (H) (4/6/2021) 74 - 106 mg/dL 114 (H) (3/23/2021) 74 - 106 mg/dL   Potassium 5.7 (H) (4/6/2021) 3.4 - 5.1 mmol/L 5.2 (H) (3/29/2021) 3.4 - 5.1 mmol/L   Sodium 151 (H) (4/6/2021) 136 - MOUTH AT BEDTIME FOR RESTLESS LEGS 60 tablet 11    ASPIRIN ADULT LOW STRENGTH 81 MG EC tablet TAKE 1 TABLET BY MOUTH DAILY 60 tablet 11    pantoprazole (PROTONIX) 40 MG tablet TAKE ONE TABLET BY MOUTH DAILY FOR STOMACH 60 tablet 11    rosuvastatin (CRESTOR) 20 MG tablet Take 1 tablet by mouth nightly 30 tablet 11    fluticasone (FLONASE) 50 MCG/ACT nasal spray 2 sprays by Nasal route nightly as needed for Rhinitis or Allergies 1 Bottle 11    lactulose (CHRONULAC) 10 GM/15ML solution Take 15 mLs by mouth nightly 3 Bottle 11    oxyCODONE HCl (OXY-IR) 10 MG immediate release tablet Take 10 mg by mouth every 6 hours as needed. 0    sodium polystyrene (SPS) 15 GM/60ML suspension Take 120 mLs by mouth once for 1 dose 1 Bottle 0    [DISCONTINUED] lisinopril (PRINIVIL;ZESTRIL) 10 MG tablet TAKE ONE TABLET BY MOUTH DAILY FOR BLOOD PRESSURE 60 tablet 11    [DISCONTINUED] vitamin D (ERGOCALCIFEROL) 1.25 MG (11391 UT) CAPS capsule take 1 capsule by mouth every week 4 capsule 11     No facility-administered encounter medications on file as of 4/20/2021. Return if symptoms worsen or fail to improve. PATIENT COUNSELING    Counseling was provided today regardingthe following topics: Healthy eating habits, Regular exercise, substance abuse and healthy sleep habits. Discussed use, benefit, and side effectsof prescribed medications. Barriers to medication compliance addressed. All patient questions answered. Pt voiced understanding.

## 2021-04-21 ENCOUNTER — HOSPITAL ENCOUNTER (OUTPATIENT)
Facility: HOSPITAL | Age: 61
Discharge: HOME | End: 2021-04-21
Payer: MEDICAID

## 2021-04-23 LAB — ALDOSTERONE: 4.8 NG/DL

## 2021-04-28 ENCOUNTER — TELEPHONE (OUTPATIENT)
Dept: FAMILY MEDICINE CLINIC | Age: 61
End: 2021-04-28

## 2021-04-28 NOTE — TELEPHONE ENCOUNTER
Patient's referral, along with all pertinent medical records faxed to the attention of Dr. JOHNS ([address] [place] [address] Nephrology) on 04/28/21, they will contact the patient and our office with appointment date/time/location.

## 2021-05-12 NOTE — TELEPHONE ENCOUNTER
Patient scheduled to see Dr. Amanda Euceda ( [facility]) on 8/13/21 at 2:30. Patient's referral, along with all pertinent medical records faxed to the attention of scheduling on 4/28/21. I have mailed patient referral with appointment date/time/location.

## 2021-08-09 ENCOUNTER — HOSPITAL ENCOUNTER (OUTPATIENT)
Facility: HOSPITAL | Age: 61
Discharge: HOME | End: 2021-08-09
Payer: MEDICAID

## 2021-08-09 LAB
A/G RATIO: 1.4 (ref 0.8–2)
ALBUMIN SERPL-MCNC: 3.9 G/DL (ref 3.4–4.8)
ALP BLD-CCNC: 126 U/L (ref 25–100)
ALT SERPL-CCNC: 30 U/L (ref 4–36)
ANION GAP SERPL CALCULATED.3IONS-SCNC: 5 MMOL/L (ref 3–16)
AST SERPL-CCNC: 21 U/L (ref 8–33)
BILIRUB SERPL-MCNC: 0.3 MG/DL (ref 0.3–1.2)
BILIRUBIN URINE: NEGATIVE
BLOOD, URINE: NEGATIVE
BUN BLDV-MCNC: 15 MG/DL (ref 6–20)
CALCIUM SERPL-MCNC: 9 MG/DL (ref 8.5–10.5)
CHLORIDE BLD-SCNC: 104 MMOL/L (ref 98–107)
CLARITY: CLEAR
CO2: 30 MMOL/L (ref 20–30)
COLOR: YELLOW
CREAT SERPL-MCNC: 1 MG/DL (ref 0.4–1.2)
GFR AFRICAN AMERICAN: >59
GFR NON-AFRICAN AMERICAN: >59
GLOBULIN: 2.8 G/DL
GLUCOSE BLD-MCNC: 116 MG/DL (ref 74–106)
GLUCOSE URINE: NEGATIVE MG/DL
KETONES, URINE: NEGATIVE MG/DL
LEUKOCYTE ESTERASE, URINE: NEGATIVE
MICROSCOPIC EXAMINATION: NORMAL
NITRITE, URINE: NEGATIVE
PH UA: 5.5 (ref 5–8)
POTASSIUM SERPL-SCNC: 4.3 MMOL/L (ref 3.4–5.1)
PROTEIN UA: NEGATIVE MG/DL
RBC UA: NORMAL /HPF (ref 0–4)
SODIUM BLD-SCNC: 139 MMOL/L (ref 136–145)
SPECIFIC GRAVITY UA: >=1.03 (ref 1–1.03)
TOTAL PROTEIN: 6.7 G/DL (ref 6.4–8.3)
URINE TYPE: NORMAL
UROBILINOGEN, URINE: 0.2 E.U./DL
WBC UA: NORMAL /HPF (ref 0–5)

## 2021-08-09 PROCEDURE — 36415 COLL VENOUS BLD VENIPUNCTURE: CPT

## 2021-08-09 PROCEDURE — 81001 URINALYSIS AUTO W/SCOPE: CPT

## 2021-08-09 PROCEDURE — 80053 COMPREHEN METABOLIC PANEL: CPT

## 2021-08-27 ENCOUNTER — HOSPITAL ENCOUNTER (OUTPATIENT)
Facility: HOSPITAL | Age: 61
Discharge: HOME | End: 2021-08-27
Payer: MEDICAID

## 2021-08-27 ENCOUNTER — OFFICE VISIT (OUTPATIENT)
Dept: FAMILY MEDICINE CLINIC | Age: 61
End: 2021-08-27
Payer: MEDICAID

## 2021-08-27 VITALS
RESPIRATION RATE: 16 BRPM | TEMPERATURE: 96.8 F | HEIGHT: 69 IN | OXYGEN SATURATION: 98 % | HEART RATE: 79 BPM | WEIGHT: 246.4 LBS | SYSTOLIC BLOOD PRESSURE: 138 MMHG | DIASTOLIC BLOOD PRESSURE: 82 MMHG | BODY MASS INDEX: 36.5 KG/M2

## 2021-08-27 DIAGNOSIS — E78.2 MIXED HYPERLIPIDEMIA: ICD-10-CM

## 2021-08-27 DIAGNOSIS — R73.03 PREDIABETES: ICD-10-CM

## 2021-08-27 DIAGNOSIS — M54.16 LUMBAR BACK PAIN WITH RADICULOPATHY AFFECTING RIGHT LOWER EXTREMITY: ICD-10-CM

## 2021-08-27 DIAGNOSIS — I10 ESSENTIAL HYPERTENSION: Primary | ICD-10-CM

## 2021-08-27 DIAGNOSIS — G62.9 PERIPHERAL POLYNEUROPATHY: ICD-10-CM

## 2021-08-27 LAB
A/G RATIO: 1.5 (ref 0.8–2)
ALBUMIN SERPL-MCNC: 3.8 G/DL (ref 3.4–4.8)
ALP BLD-CCNC: 129 U/L (ref 25–100)
ALT SERPL-CCNC: 62 U/L (ref 4–36)
ANION GAP SERPL CALCULATED.3IONS-SCNC: 7 MMOL/L (ref 3–16)
AST SERPL-CCNC: 38 U/L (ref 8–33)
BILIRUB SERPL-MCNC: 0.3 MG/DL (ref 0.3–1.2)
BUN BLDV-MCNC: 21 MG/DL (ref 6–20)
CALCIUM SERPL-MCNC: 9.6 MG/DL (ref 8.5–10.5)
CHLORIDE BLD-SCNC: 103 MMOL/L (ref 98–107)
CHOLESTEROL, TOTAL: 156 MG/DL (ref 0–200)
CO2: 34 MMOL/L (ref 20–30)
CREAT SERPL-MCNC: 1.1 MG/DL (ref 0.4–1.2)
GFR AFRICAN AMERICAN: >59
GFR NON-AFRICAN AMERICAN: >59
GLOBULIN: 2.6 G/DL
GLUCOSE BLD-MCNC: 109 MG/DL (ref 74–106)
HBA1C MFR BLD: 6.1 %
HDLC SERPL-MCNC: 44 MG/DL (ref 40–60)
LDL CHOLESTEROL CALCULATED: 69 MG/DL
POTASSIUM SERPL-SCNC: 4.9 MMOL/L (ref 3.4–5.1)
SODIUM BLD-SCNC: 144 MMOL/L (ref 136–145)
TOTAL PROTEIN: 6.4 G/DL (ref 6.4–8.3)
TRIGL SERPL-MCNC: 213 MG/DL (ref 0–249)
VLDLC SERPL CALC-MCNC: 43 MG/DL

## 2021-08-27 PROCEDURE — 1036F TOBACCO NON-USER: CPT | Performed by: NURSE PRACTITIONER

## 2021-08-27 PROCEDURE — 3017F COLORECTAL CA SCREEN DOC REV: CPT | Performed by: NURSE PRACTITIONER

## 2021-08-27 PROCEDURE — 36415 COLL VENOUS BLD VENIPUNCTURE: CPT

## 2021-08-27 PROCEDURE — G8417 CALC BMI ABV UP PARAM F/U: HCPCS | Performed by: NURSE PRACTITIONER

## 2021-08-27 PROCEDURE — 80061 LIPID PANEL: CPT

## 2021-08-27 PROCEDURE — 80053 COMPREHEN METABOLIC PANEL: CPT

## 2021-08-27 PROCEDURE — 83036 HEMOGLOBIN GLYCOSYLATED A1C: CPT

## 2021-08-27 PROCEDURE — G8427 DOCREV CUR MEDS BY ELIG CLIN: HCPCS | Performed by: NURSE PRACTITIONER

## 2021-08-27 PROCEDURE — 99214 OFFICE O/P EST MOD 30 MIN: CPT | Performed by: NURSE PRACTITIONER

## 2021-08-27 RX ORDER — METOLAZONE 2.5 MG/1
1 TABLET ORAL EVERY OTHER DAY
COMMUNITY
Start: 2021-08-13

## 2021-08-27 ASSESSMENT — ENCOUNTER SYMPTOMS
BACK PAIN: 1
EYES NEGATIVE: 1
GASTROINTESTINAL NEGATIVE: 1
RESPIRATORY NEGATIVE: 1

## 2021-08-27 NOTE — PROGRESS NOTES
SUBJECTIVE:  Inge Hahn is a 64 y.o. male that presents with   Chief Complaint   Patient presents with    Hypertension     reg f/u   . HPI:    This is a very pleasant 79-year-old white male who presents today for follow-up with hypertension hyperlipidemia and back pain. He tells me he is taking all of his medications and tolerating everything well he did see the kidney doctor and they have put him on Zaroxolyn and encouraged him to go back and see cardiology. He tells me that he does have follow-up with them. He was kicked out of pain management because it and going for pill count he says he was too sick to go but that they would not listen. He has not had any pain medicine since July. He wants me to send him to another pain management. He tells me that he has used some Gummies that his children brought him I do think that they get them out of [place] so they may very well be marijuana and I told him that that may be an issue. He should verify or what it is he is taking before he takes it. He says other than the pain that he does seem to be doing okay. Hypertension  This is a chronic problem. The current episode started more than 1 year ago. The problem is unchanged. The problem is controlled. Associated symptoms include peripheral edema. There are no associated agents to hypertension. Risk factors for coronary artery disease include dyslipidemia, family history, male gender, obesity, stress and sedentary lifestyle. Past treatments include ACE inhibitors. The current treatment provides significant improvement. Compliance problems include exercise. Hyperlipidemia  This is a chronic problem. The current episode started more than 1 year ago. The problem is controlled. Recent lipid tests were reviewed and are normal. Factors aggravating his hyperlipidemia include fatty foods. Associated symptoms include leg pain and myalgias. Current antihyperlipidemic treatment includes statins and diet change.  The current treatment provides moderate improvement of lipids. Compliance problems include adherence to exercise (very limited for exercise). Risk factors for coronary artery disease include dyslipidemia, family history, male sex, obesity, a sedentary lifestyle and stress. Back Pain  This is a chronic (sees pain management) problem. The current episode started more than 1 year ago. The problem occurs constantly. The pain is present in the lumbar spine, sacro-iliac, gluteal and thoracic spine. The quality of the pain is described as aching, burning, cramping and stabbing. The pain radiates to the left foot, left knee and left thigh. The pain is at a severity of 6/10. The pain is moderate. The pain is the same all the time. The symptoms are aggravated by bending, position and twisting. Stiffness is present all day, at night and in the morning. Associated symptoms include leg pain and paresis. Risk factors include lack of exercise and sedentary lifestyle. He has tried NSAIDs, home exercises, heat and analgesics for the symptoms. The treatment provided mild relief. Review of Systems   Constitutional: Negative. HENT: Negative. Eyes: Negative. Respiratory: Negative. Cardiovascular: Positive for leg swelling. Gastrointestinal: Negative. Endocrine: Negative. Genitourinary: Negative. Musculoskeletal: Positive for arthralgias, back pain and myalgias. Skin: Negative. Neurological: Negative. Hematological: Negative. Psychiatric/Behavioral: Negative. All other systems reviewed and are negative. OBJECTIVE:  /82   Pulse 79   Temp 96.8 °F (36 °C) (Infrared)   Resp 16   Ht 5' 9\" (1.753 m)   Wt 246 lb 6.4 oz (111.8 kg)   SpO2 98%   BMI 36.39 kg/m²   Physical Exam  Vitals and nursing note reviewed. Constitutional:       Appearance: Normal appearance. He is obese. HENT:      Head: Normocephalic and atraumatic.    Eyes:      Conjunctiva/sclera: Conjunctivae normal. Cardiovascular:      Rate and Rhythm: Normal rate and regular rhythm. Pulses: Normal pulses. Heart sounds: Normal heart sounds. Pulmonary:      Effort: Pulmonary effort is normal.      Breath sounds: Normal breath sounds. Musculoskeletal:      Cervical back: Normal range of motion and neck supple. Lumbar back: Tenderness present. Decreased range of motion. Right lower leg: Edema present. Left lower leg: Edema present. Skin:     General: Skin is warm and dry. Capillary Refill: Capillary refill takes less than 2 seconds. Neurological:      Mental Status: He is alert and oriented to person, place, and time. Psychiatric:         Mood and Affect: Mood normal.         Behavior: Behavior normal.         Thought Content:  Thought content normal.         Judgment: Judgment normal.       Results in Past 30 Days  Result Component Current Result Ref Range Previous Result Ref Range   Albumin/Globulin Ratio 1.4 (8/9/2021) 0.8 - 2.0 Not in Time Range    Albumin 3.9 (8/9/2021) 3.4 - 4.8 g/dL Not in Time Range    Alkaline Phosphatase 126 (H) (8/9/2021) 25 - 100 U/L Not in Time Range    ALT 30 (8/9/2021) 4 - 36 U/L Not in Time Range    AST 21 (8/9/2021) 8 - 33 U/L Not in Time Range    BUN 15 (8/9/2021) 6 - 20 mg/dL Not in Time Range    Calcium 9.0 (8/9/2021) 8.5 - 10.5 mg/dL Not in Time Range    Chloride 104 (8/9/2021) 98 - 107 mmol/L Not in Time Range    CO2 30 (8/9/2021) 20 - 30 mmol/L Not in Time Range    CREATININE 1.0 (8/9/2021) 0.4 - 1.2 mg/dL Not in Time Range    GFR  >59 (8/9/2021) >59 Not in Time Range    GFR Non- >59 (8/9/2021) >59 Not in Time Range    Globulin 2.8 (8/9/2021) g/dL Not in Time Range    Glucose 116 (H) (8/9/2021) 74 - 106 mg/dL Not in Time Range    Potassium 4.3 (8/9/2021) 3.4 - 5.1 mmol/L Not in Time Range    Sodium 139 (8/9/2021) 136 - 145 mmol/L Not in Time Range    Total Bilirubin 0.3 (8/9/2021) 0.3 - 1.2 mg/dL Not in Time Range Total Protein 6.7 (8/9/2021) 6.4 - 8.3 g/dL Not in Time Range        Hemoglobin A1C (%)   Date Value   03/23/2021 5.6     Microscopic Examination (no units)   Date Value   08/09/2021 Not Indicated     LDL Calculated (mg/dL)   Date Value   03/23/2021 142 (H)         Lab Results   Component Value Date    WBC 6.3 12/05/2017    NEUTROABS 2.8 12/05/2017    HGB 14.6 12/05/2017    HCT 45.8 12/05/2017    MCV 98.1 12/05/2017     12/05/2017       Lab Results   Component Value Date    TSH 1.52 03/10/2020         ASSESSMENT/PLAN:   Diagnosis Orders   1. Essential hypertension     2. Mixed hyperlipidemia  Comprehensive Metabolic Panel    Lipid Panel   3. Lumbar back pain with radiculopathy affecting right lower extremity  External Referral To Pain Clinic   4. Peripheral polyneuropathy  External Referral To Pain Clinic   5.  Prediabetes  Hemoglobin A1C           Orders Placed This Encounter   Procedures    Hemoglobin A1C     Standing Status:   Future     Number of Occurrences:   1     Standing Expiration Date:   8/27/2022    Comprehensive Metabolic Panel     Standing Status:   Future     Number of Occurrences:   1     Standing Expiration Date:   8/27/2022    Lipid Panel     Standing Status:   Future     Number of Occurrences:   1     Standing Expiration Date:   8/27/2022     Order Specific Question:   Is Patient Fasting?/# of Hours     Answer:   6    External Referral To Pain Clinic     Referral Priority:   Routine     Referral Type:   Eval and Treat     Referral Reason:   Specialty Services Required     Requested Specialty:   Pain Medicine     Number of Visits Requested:   1        Outpatient Encounter Medications as of 8/27/2021   Medication Sig Dispense Refill    metOLazone (ZAROXOLYN) 2.5 MG tablet Take 1 tablet by mouth every other day      metoprolol succinate (TOPROL XL) 50 MG extended release tablet Take 1 tablet by mouth daily (Patient taking differently: Take 100 mg by mouth daily ) 30 tablet 3    atorvastatin (LIPITOR) 20 MG tablet Take 1 tablet by mouth daily 30 tablet 5    brimonidine (ALPHAGAN) 0.2 % ophthalmic solution       latanoprost (XALATAN) 0.005 % ophthalmic solution       Cholecalciferol (VITAMIN D3) 1.25 MG (72301 UT) CAPS TAKE 1 CAPSULE BY MOUTH EVERY WEEK 8 capsule 11    escitalopram (LEXAPRO) 20 MG tablet TAKE 1 TABLET BY MOUTH DAILY 60 tablet 11    bumetanide (BUMEX) 2 MG tablet TAKE ONE TABLET BY MOUTH EVERY MORNING 60 tablet 11    rOPINIRole (REQUIP) 2 MG tablet TAKE 1 TABLET BY MOUTH AT BEDTIME FOR RESTLESS LEGS 60 tablet 11    ASPIRIN ADULT LOW STRENGTH 81 MG EC tablet TAKE 1 TABLET BY MOUTH DAILY 60 tablet 11    pantoprazole (PROTONIX) 40 MG tablet TAKE ONE TABLET BY MOUTH DAILY FOR STOMACH 60 tablet 11    rosuvastatin (CRESTOR) 20 MG tablet Take 1 tablet by mouth nightly 30 tablet 11    fluticasone (FLONASE) 50 MCG/ACT nasal spray 2 sprays by Nasal route nightly as needed for Rhinitis or Allergies 1 Bottle 11    lactulose (CHRONULAC) 10 GM/15ML solution Take 15 mLs by mouth nightly 3 Bottle 11    sodium polystyrene (SPS) 15 GM/60ML suspension Take 120 mLs by mouth once for 1 dose 1 Bottle 0    [DISCONTINUED] vitamin D (ERGOCALCIFEROL) 1.25 MG (92026 UT) CAPS capsule take 1 capsule by mouth every week 4 capsule 11    [DISCONTINUED] oxyCODONE HCl (OXY-IR) 10 MG immediate release tablet Take 10 mg by mouth every 6 hours as needed. 0     No facility-administered encounter medications on file as of 8/27/2021. Return in about 3 months (around 11/27/2021), or if symptoms worsen or fail to improve. PATIENT COUNSELING    Counseling was provided today regardingthe following topics: Healthy eating habits, Regular exercise, substance abuse and healthy sleep habits. Discussed use, benefit, and side effectsof prescribed medications. Barriers to medication compliance addressed. All patient questions answered. Pt voiced understanding.

## 2021-08-27 NOTE — PROGRESS NOTES
Chief Complaint   Patient presents with    Hypertension     reg f/u       Have you seen any other physician or provider since your last visit yes - nephrology    Have you had any other diagnostic tests since your last visit? no    Have you changed or stopped any medications since your last visit?  yes - addded to med list

## 2021-09-09 ENCOUNTER — TELEPHONE (OUTPATIENT)
Dept: FAMILY MEDICINE CLINIC | Age: 61
End: 2021-09-09

## 2021-09-09 NOTE — TELEPHONE ENCOUNTER
I have faxed Pain Mgt referral to [address] [place] [address], they will contact the patient with appointment date/time/location.

## 2021-12-01 ENCOUNTER — HOSPITAL ENCOUNTER (OUTPATIENT)
Facility: HOSPITAL | Age: 61
Discharge: HOME | End: 2021-12-01
Payer: MEDICAID

## 2021-12-01 LAB
A/G RATIO: 1.6 (ref 0.8–2)
ALBUMIN SERPL-MCNC: 4.1 G/DL (ref 3.4–4.8)
ALP BLD-CCNC: 119 U/L (ref 25–100)
ALT SERPL-CCNC: 62 U/L (ref 4–36)
ANION GAP SERPL CALCULATED.3IONS-SCNC: 13 MMOL/L (ref 3–16)
AST SERPL-CCNC: 35 U/L (ref 8–33)
BILIRUB SERPL-MCNC: <0.2 MG/DL (ref 0.3–1.2)
BILIRUBIN URINE: NEGATIVE
BLOOD, URINE: NEGATIVE
BUN BLDV-MCNC: 19 MG/DL (ref 6–20)
CALCIUM SERPL-MCNC: 9.5 MG/DL (ref 8.5–10.5)
CHLORIDE BLD-SCNC: 99 MMOL/L (ref 98–107)
CLARITY: CLEAR
CO2: 31 MMOL/L (ref 20–30)
COLOR: YELLOW
CREAT SERPL-MCNC: 1.1 MG/DL (ref 0.4–1.2)
GFR AFRICAN AMERICAN: >59
GFR NON-AFRICAN AMERICAN: >59
GLOBULIN: 2.5 G/DL
GLUCOSE BLD-MCNC: 104 MG/DL (ref 74–106)
GLUCOSE URINE: NEGATIVE MG/DL
HCT VFR BLD CALC: 45.4 % (ref 40–54)
HEMOGLOBIN: 14.6 G/DL (ref 13–18)
KETONES, URINE: NEGATIVE MG/DL
LEUKOCYTE ESTERASE, URINE: NEGATIVE
MCH RBC QN AUTO: 31.7 PG (ref 27–32)
MCHC RBC AUTO-ENTMCNC: 32.2 G/DL (ref 31–35)
MCV RBC AUTO: 98.5 FL (ref 80–100)
MICROSCOPIC EXAMINATION: NORMAL
NITRITE, URINE: NEGATIVE
PDW BLD-RTO: 16 % (ref 11–16)
PH UA: 6 (ref 5–8)
PLATELET # BLD: 373 K/UL (ref 150–400)
PMV BLD AUTO: 9.3 FL (ref 6–10)
POTASSIUM SERPL-SCNC: 3.8 MMOL/L (ref 3.4–5.1)
PROTEIN UA: NEGATIVE MG/DL
RBC # BLD: 4.61 M/UL (ref 4.5–6)
SODIUM BLD-SCNC: 143 MMOL/L (ref 136–145)
SPECIFIC GRAVITY UA: >=1.03 (ref 1–1.03)
TOTAL PROTEIN: 6.6 G/DL (ref 6.4–8.3)
URINE TYPE: NORMAL
UROBILINOGEN, URINE: 0.2 E.U./DL
WBC # BLD: 7.7 K/UL (ref 4–11)

## 2021-12-01 PROCEDURE — 81003 URINALYSIS AUTO W/O SCOPE: CPT

## 2021-12-01 PROCEDURE — 36415 COLL VENOUS BLD VENIPUNCTURE: CPT

## 2021-12-01 PROCEDURE — 80053 COMPREHEN METABOLIC PANEL: CPT

## 2021-12-01 PROCEDURE — 85027 COMPLETE CBC AUTOMATED: CPT

## 2021-12-13 ENCOUNTER — OFFICE VISIT (OUTPATIENT)
Dept: FAMILY MEDICINE CLINIC | Age: 61
End: 2021-12-13
Payer: MEDICAID

## 2021-12-13 VITALS
HEART RATE: 91 BPM | HEIGHT: 69 IN | TEMPERATURE: 97.9 F | DIASTOLIC BLOOD PRESSURE: 70 MMHG | OXYGEN SATURATION: 98 % | SYSTOLIC BLOOD PRESSURE: 132 MMHG | BODY MASS INDEX: 37.68 KG/M2 | WEIGHT: 254.4 LBS | RESPIRATION RATE: 16 BRPM

## 2021-12-13 DIAGNOSIS — I10 ESSENTIAL HYPERTENSION: Primary | ICD-10-CM

## 2021-12-13 DIAGNOSIS — E78.2 MIXED HYPERLIPIDEMIA: ICD-10-CM

## 2021-12-13 DIAGNOSIS — G62.9 PERIPHERAL POLYNEUROPATHY: ICD-10-CM

## 2021-12-13 DIAGNOSIS — Z23 FLU VACCINE NEED: ICD-10-CM

## 2021-12-13 DIAGNOSIS — R73.03 PREDIABETES: ICD-10-CM

## 2021-12-13 DIAGNOSIS — M54.16 LUMBAR BACK PAIN WITH RADICULOPATHY AFFECTING RIGHT LOWER EXTREMITY: ICD-10-CM

## 2021-12-13 PROCEDURE — 90674 CCIIV4 VAC NO PRSV 0.5 ML IM: CPT | Performed by: NURSE PRACTITIONER

## 2021-12-13 PROCEDURE — G8417 CALC BMI ABV UP PARAM F/U: HCPCS | Performed by: NURSE PRACTITIONER

## 2021-12-13 PROCEDURE — 3017F COLORECTAL CA SCREEN DOC REV: CPT | Performed by: NURSE PRACTITIONER

## 2021-12-13 PROCEDURE — 99213 OFFICE O/P EST LOW 20 MIN: CPT | Performed by: NURSE PRACTITIONER

## 2021-12-13 PROCEDURE — G8482 FLU IMMUNIZE ORDER/ADMIN: HCPCS | Performed by: NURSE PRACTITIONER

## 2021-12-13 PROCEDURE — G8427 DOCREV CUR MEDS BY ELIG CLIN: HCPCS | Performed by: NURSE PRACTITIONER

## 2021-12-13 PROCEDURE — 90471 IMMUNIZATION ADMIN: CPT | Performed by: NURSE PRACTITIONER

## 2021-12-13 PROCEDURE — 1036F TOBACCO NON-USER: CPT | Performed by: NURSE PRACTITIONER

## 2021-12-13 ASSESSMENT — ENCOUNTER SYMPTOMS
GASTROINTESTINAL NEGATIVE: 1
BACK PAIN: 1
RESPIRATORY NEGATIVE: 1
EYES NEGATIVE: 1

## 2021-12-13 NOTE — PROGRESS NOTES
SUBJECTIVE:  Mandie Calderon is a 63 y.o. male that presents with   Chief Complaint   Patient presents with    Hypertension     reg f/u    . HPI:    63year old male is here for a follow up visit. VSS. Pt denies any SOB, CP or palpitations. Lungs are clear, no wheezes or rales. Heart RRR, no murmurs or rales. Bilateral LE have +1 edema. Says he hasn't taken his Bumex this morning. Pt notes that when resting he does keep his feet propped up. Pt denies any concerns at this time. Does not need refills on meds. He reports he is taking all of his medications they did put him on some lactulose which has miraculously helped his potassium. Hypertension  This is a chronic problem. The current episode started more than 1 year ago. The problem is unchanged. The problem is controlled. Associated symptoms include peripheral edema. There are no associated agents to hypertension. Risk factors for coronary artery disease include dyslipidemia, family history, male gender, obesity, stress and sedentary lifestyle. Past treatments include ACE inhibitors. The current treatment provides significant improvement. Compliance problems include exercise. Hyperlipidemia  This is a chronic problem. The current episode started more than 1 year ago. The problem is controlled. Recent lipid tests were reviewed and are normal. Factors aggravating his hyperlipidemia include fatty foods. Associated symptoms include leg pain and myalgias. Current antihyperlipidemic treatment includes statins and diet change. The current treatment provides moderate improvement of lipids. Compliance problems include adherence to exercise (very limited for exercise). Risk factors for coronary artery disease include dyslipidemia, family history, male sex, obesity, a sedentary lifestyle and stress. Back Pain  This is a chronic (sees pain management) problem. The current episode started more than 1 year ago. The problem occurs constantly.  The pain is present in the lumbar spine, sacro-iliac, gluteal and thoracic spine. The quality of the pain is described as aching, burning, cramping and stabbing. The pain radiates to the left foot, left knee and left thigh. The pain is at a severity of 6/10. The pain is moderate. The pain is the same all the time. The symptoms are aggravated by bending, position and twisting. Stiffness is present all day, at night and in the morning. Associated symptoms include leg pain and paresis. Risk factors include lack of exercise and sedentary lifestyle. He has tried NSAIDs, home exercises, heat and analgesics for the symptoms. The treatment provided mild relief. Review of Systems   Constitutional: Negative. HENT: Negative. Eyes: Negative. Respiratory: Negative. Cardiovascular: Positive for leg swelling. Gastrointestinal: Negative. Endocrine: Negative. Genitourinary: Negative. Musculoskeletal: Positive for arthralgias, back pain and myalgias. Skin: Negative. Neurological: Negative. Hematological: Negative. Psychiatric/Behavioral: Negative. All other systems reviewed and are negative. OBJECTIVE:  /70   Pulse 91   Temp 97.9 °F (36.6 °C) (Infrared)   Resp 16   Ht 5' 9\" (1.753 m)   Wt 254 lb 6.4 oz (115.4 kg)   SpO2 98%   BMI 37.57 kg/m²   Physical Exam  Vitals and nursing note reviewed. Constitutional:       Appearance: Normal appearance. He is obese. HENT:      Head: Normocephalic and atraumatic. Eyes:      Conjunctiva/sclera: Conjunctivae normal.   Cardiovascular:      Rate and Rhythm: Normal rate and regular rhythm. Pulses: Normal pulses. Heart sounds: Normal heart sounds. Pulmonary:      Effort: Pulmonary effort is normal.      Breath sounds: Normal breath sounds. Musculoskeletal:      Cervical back: Normal range of motion and neck supple. Lumbar back: Tenderness present. Decreased range of motion. Right lower leg: Edema present.       Left lower leg: Edema present. Skin:     General: Skin is warm and dry. Capillary Refill: Capillary refill takes less than 2 seconds. Neurological:      Mental Status: He is alert and oriented to person, place, and time. Psychiatric:         Mood and Affect: Mood normal.         Behavior: Behavior normal.         Thought Content:  Thought content normal.         Judgment: Judgment normal.       Results in Past 30 Days  Result Component Current Result Ref Range Previous Result Ref Range   Albumin/Globulin Ratio 1.6 (12/1/2021) 0.8 - 2.0 Not in Time Range    Albumin 4.1 (12/1/2021) 3.4 - 4.8 g/dL Not in Time Range    Alkaline Phosphatase 119 (H) (12/1/2021) 25 - 100 U/L Not in Time Range    ALT 62 (H) (12/1/2021) 4 - 36 U/L Not in Time Range    AST 35 (H) (12/1/2021) 8 - 33 U/L Not in Time Range    BUN 19 (12/1/2021) 6 - 20 mg/dL Not in Time Range    Calcium 9.5 (12/1/2021) 8.5 - 10.5 mg/dL Not in Time Range    Chloride 99 (12/1/2021) 98 - 107 mmol/L Not in Time Range    CO2 31 (H) (12/1/2021) 20 - 30 mmol/L Not in Time Range    CREATININE 1.1 (12/1/2021) 0.4 - 1.2 mg/dL Not in Time Range    GFR  >59 (12/1/2021) >59 Not in Time Range    GFR Non- >59 (12/1/2021) >59 Not in Time Range    Globulin 2.5 (12/1/2021) Not Established g/dL Not in Time Range    Glucose 104 (12/1/2021) 74 - 106 mg/dL Not in Time Range    Potassium 3.8 (12/1/2021) 3.4 - 5.1 mmol/L Not in Time Range    Sodium 143 (12/1/2021) 136 - 145 mmol/L Not in Time Range    Total Bilirubin <0.2 (L) (12/1/2021) 0.3 - 1.2 mg/dL Not in Time Range    Total Protein 6.6 (12/1/2021) 6.4 - 8.3 g/dL Not in Time Range        Hemoglobin A1C (%)   Date Value   08/27/2021 6.1 (H)     Microscopic Examination (no units)   Date Value   12/01/2021 Not Indicated     LDL Calculated (mg/dL)   Date Value   08/27/2021 69         Lab Results   Component Value Date    WBC 7.7 12/01/2021    NEUTROABS 2.8 12/05/2017    HGB 14.6 12/01/2021    HCT 45.4 12/01/2021    MCV 98.5 12/01/2021     12/01/2021       Lab Results   Component Value Date    TSH 1.52 03/10/2020         ASSESSMENT/PLAN:   Diagnosis Orders   1. Essential hypertension     2. Mixed hyperlipidemia     3. Prediabetes     4. Peripheral polyneuropathy     5. Lumbar back pain with radiculopathy affecting right lower extremity     6.  Flu vaccine need  INFLUENZA, MDCK QUADV, 2 YRS AND OLDER, IM, PF, PREFILL SYR OR SDV, 0.5ML (FLUCELVAX QUADV, PF)           Orders Placed This Encounter   Procedures    INFLUENZA, MDCK QUADV, 2 YRS AND OLDER, IM, PF, PREFILL SYR OR SDV, 0.5ML (FLUCELVAX QUADV, PF)        Outpatient Encounter Medications as of 12/13/2021   Medication Sig Dispense Refill    metOLazone (ZAROXOLYN) 2.5 MG tablet Take 1 tablet by mouth every other day      metoprolol succinate (TOPROL XL) 50 MG extended release tablet Take 1 tablet by mouth daily (Patient taking differently: Take 100 mg by mouth daily ) 30 tablet 3    atorvastatin (LIPITOR) 20 MG tablet Take 1 tablet by mouth daily 30 tablet 5    brimonidine (ALPHAGAN) 0.2 % ophthalmic solution       latanoprost (XALATAN) 0.005 % ophthalmic solution       Cholecalciferol (VITAMIN D3) 1.25 MG (28473 UT) CAPS TAKE 1 CAPSULE BY MOUTH EVERY WEEK 8 capsule 11    escitalopram (LEXAPRO) 20 MG tablet TAKE 1 TABLET BY MOUTH DAILY 60 tablet 11    bumetanide (BUMEX) 2 MG tablet TAKE ONE TABLET BY MOUTH EVERY MORNING 60 tablet 11    rOPINIRole (REQUIP) 2 MG tablet TAKE 1 TABLET BY MOUTH AT BEDTIME FOR RESTLESS LEGS 60 tablet 11    ASPIRIN ADULT LOW STRENGTH 81 MG EC tablet TAKE 1 TABLET BY MOUTH DAILY 60 tablet 11    pantoprazole (PROTONIX) 40 MG tablet TAKE ONE TABLET BY MOUTH DAILY FOR STOMACH 60 tablet 11    rosuvastatin (CRESTOR) 20 MG tablet Take 1 tablet by mouth nightly 30 tablet 11    fluticasone (FLONASE) 50 MCG/ACT nasal spray 2 sprays by Nasal route nightly as needed for Rhinitis or Allergies 1 Bottle 11    lactulose (CHRONULAC) 10 GM/15ML solution Take 15 mLs by mouth nightly 3 Bottle 11    [DISCONTINUED] sodium polystyrene (SPS) 15 GM/60ML suspension Take 120 mLs by mouth once for 1 dose 1 Bottle 0    [DISCONTINUED] vitamin D (ERGOCALCIFEROL) 1.25 MG (36883 UT) CAPS capsule take 1 capsule by mouth every week 4 capsule 11     No facility-administered encounter medications on file as of 12/13/2021. Return in about 3 months (around 3/13/2022), or if symptoms worsen or fail to improve. PATIENT COUNSELING    Counseling was provided today regardingthe following topics: Healthy eating habits, Regular exercise, substance abuse and healthy sleep habits. Discussed use, benefit, and side effectsof prescribed medications. Barriers to medication compliance addressed. All patient questions answered. Pt voiced understanding.

## 2022-03-14 ENCOUNTER — OFFICE VISIT (OUTPATIENT)
Dept: FAMILY MEDICINE CLINIC | Age: 62
End: 2022-03-14
Payer: MEDICAID

## 2022-03-14 ENCOUNTER — HOSPITAL ENCOUNTER (OUTPATIENT)
Facility: HOSPITAL | Age: 62
Discharge: HOME | End: 2022-03-14
Payer: MEDICAID

## 2022-03-14 VITALS
BODY MASS INDEX: 40.11 KG/M2 | SYSTOLIC BLOOD PRESSURE: 130 MMHG | RESPIRATION RATE: 18 BRPM | OXYGEN SATURATION: 98 % | HEIGHT: 69 IN | HEART RATE: 79 BPM | DIASTOLIC BLOOD PRESSURE: 78 MMHG | TEMPERATURE: 97.3 F | WEIGHT: 270.8 LBS

## 2022-03-14 DIAGNOSIS — E78.2 MIXED HYPERLIPIDEMIA: ICD-10-CM

## 2022-03-14 DIAGNOSIS — R73.03 PREDIABETES: ICD-10-CM

## 2022-03-14 DIAGNOSIS — M54.16 LUMBAR BACK PAIN WITH RADICULOPATHY AFFECTING RIGHT LOWER EXTREMITY: ICD-10-CM

## 2022-03-14 DIAGNOSIS — I10 ESSENTIAL HYPERTENSION: Primary | ICD-10-CM

## 2022-03-14 DIAGNOSIS — R60.0 LEG EDEMA, LEFT: ICD-10-CM

## 2022-03-14 LAB
A/G RATIO: 1.6 (ref 0.8–2)
ALBUMIN SERPL-MCNC: 3.9 G/DL (ref 3.4–4.8)
ALP BLD-CCNC: 101 U/L (ref 25–100)
ALT SERPL-CCNC: 56 U/L (ref 4–36)
ANION GAP SERPL CALCULATED.3IONS-SCNC: 12 MMOL/L (ref 3–16)
AST SERPL-CCNC: 30 U/L (ref 8–33)
BILIRUB SERPL-MCNC: 0.3 MG/DL (ref 0.3–1.2)
BUN BLDV-MCNC: 22 MG/DL (ref 6–20)
CALCIUM SERPL-MCNC: 9.6 MG/DL (ref 8.5–10.5)
CHLORIDE BLD-SCNC: 98 MMOL/L (ref 98–107)
CHOLESTEROL, TOTAL: 188 MG/DL (ref 0–200)
CO2: 31 MMOL/L (ref 20–30)
CREAT SERPL-MCNC: 0.8 MG/DL (ref 0.4–1.2)
GFR AFRICAN AMERICAN: >59
GFR NON-AFRICAN AMERICAN: >59
GLOBULIN: 2.5 G/DL
GLUCOSE BLD-MCNC: 109 MG/DL (ref 74–106)
HBA1C MFR BLD: 6.4 %
HDLC SERPL-MCNC: 46 MG/DL (ref 40–60)
LDL CHOLESTEROL CALCULATED: 98 MG/DL
POTASSIUM SERPL-SCNC: 3.8 MMOL/L (ref 3.4–5.1)
SODIUM BLD-SCNC: 141 MMOL/L (ref 136–145)
TOTAL PROTEIN: 6.4 G/DL (ref 6.4–8.3)
TRIGL SERPL-MCNC: 221 MG/DL (ref 0–249)
VLDLC SERPL CALC-MCNC: 44 MG/DL

## 2022-03-14 PROCEDURE — G8417 CALC BMI ABV UP PARAM F/U: HCPCS | Performed by: NURSE PRACTITIONER

## 2022-03-14 PROCEDURE — 83036 HEMOGLOBIN GLYCOSYLATED A1C: CPT

## 2022-03-14 PROCEDURE — 36415 COLL VENOUS BLD VENIPUNCTURE: CPT

## 2022-03-14 PROCEDURE — G8427 DOCREV CUR MEDS BY ELIG CLIN: HCPCS | Performed by: NURSE PRACTITIONER

## 2022-03-14 PROCEDURE — 3017F COLORECTAL CA SCREEN DOC REV: CPT | Performed by: NURSE PRACTITIONER

## 2022-03-14 PROCEDURE — 1036F TOBACCO NON-USER: CPT | Performed by: NURSE PRACTITIONER

## 2022-03-14 PROCEDURE — G8482 FLU IMMUNIZE ORDER/ADMIN: HCPCS | Performed by: NURSE PRACTITIONER

## 2022-03-14 PROCEDURE — 99214 OFFICE O/P EST MOD 30 MIN: CPT | Performed by: NURSE PRACTITIONER

## 2022-03-14 PROCEDURE — 80053 COMPREHEN METABOLIC PANEL: CPT

## 2022-03-14 PROCEDURE — 80061 LIPID PANEL: CPT

## 2022-03-14 RX ORDER — ESCITALOPRAM OXALATE 20 MG/1
20 TABLET ORAL DAILY
Qty: 60 TABLET | Refills: 11 | Status: SHIPPED | OUTPATIENT
Start: 2022-03-14

## 2022-03-14 RX ORDER — CHOLECALCIFEROL (VITAMIN D3) 1250 MCG
CAPSULE ORAL
Qty: 8 CAPSULE | Refills: 11 | Status: SHIPPED | OUTPATIENT
Start: 2022-03-14

## 2022-03-14 RX ORDER — PANTOPRAZOLE SODIUM 40 MG/1
TABLET, DELAYED RELEASE ORAL
Qty: 60 TABLET | Refills: 11 | Status: SHIPPED | OUTPATIENT
Start: 2022-03-14

## 2022-03-14 RX ORDER — METOPROLOL SUCCINATE 50 MG/1
50 TABLET, EXTENDED RELEASE ORAL DAILY
Qty: 30 TABLET | Refills: 11 | Status: SHIPPED | OUTPATIENT
Start: 2022-03-14

## 2022-03-14 RX ORDER — ROPINIROLE 2 MG/1
TABLET, FILM COATED ORAL
Qty: 60 TABLET | Refills: 11 | Status: SHIPPED | OUTPATIENT
Start: 2022-03-14

## 2022-03-14 RX ORDER — LACTULOSE 10 G/15ML
10 SOLUTION ORAL NIGHTLY
Qty: 450 ML | Refills: 11 | Status: SHIPPED | OUTPATIENT
Start: 2022-03-14

## 2022-03-14 RX ORDER — ASPIRIN 81 MG/1
TABLET ORAL
Qty: 60 TABLET | Refills: 11 | Status: SHIPPED | OUTPATIENT
Start: 2022-03-14

## 2022-03-14 RX ORDER — SILDENAFIL 100 MG/1
100 TABLET, FILM COATED ORAL PRN
Qty: 30 TABLET | Refills: 0 | Status: SHIPPED | OUTPATIENT
Start: 2022-03-14

## 2022-03-14 RX ORDER — ATORVASTATIN CALCIUM 20 MG/1
20 TABLET, FILM COATED ORAL DAILY
Qty: 30 TABLET | Refills: 11 | Status: SHIPPED | OUTPATIENT
Start: 2022-03-14

## 2022-03-14 RX ORDER — ROSUVASTATIN CALCIUM 20 MG/1
20 TABLET, COATED ORAL NIGHTLY
Qty: 30 TABLET | Refills: 11 | Status: SHIPPED | OUTPATIENT
Start: 2022-03-14

## 2022-03-14 RX ORDER — FLUTICASONE PROPIONATE 50 MCG
2 SPRAY, SUSPENSION (ML) NASAL NIGHTLY PRN
Qty: 1 EACH | Refills: 11 | Status: SHIPPED | OUTPATIENT
Start: 2022-03-14

## 2022-03-14 RX ORDER — BUMETANIDE 2 MG/1
TABLET ORAL
Qty: 60 TABLET | Refills: 11 | Status: SHIPPED | OUTPATIENT
Start: 2022-03-14

## 2022-03-14 ASSESSMENT — ENCOUNTER SYMPTOMS
GASTROINTESTINAL NEGATIVE: 1
RESPIRATORY NEGATIVE: 1
BACK PAIN: 1
EYES NEGATIVE: 1

## 2022-03-14 NOTE — PROGRESS NOTES
Chief Complaint   Patient presents with    Hypertension     reg f/u        Have you seen any other physician or provider since your last visit yes - [facility]     Have you had any other diagnostic tests since your last visit?  yes - injections in back    Have you changed or stopped any medications since your last visit? no

## 2022-03-28 DIAGNOSIS — R60.0 LEG EDEMA, LEFT: ICD-10-CM

## 2022-06-14 ENCOUNTER — OFFICE VISIT (OUTPATIENT)
Dept: FAMILY MEDICINE CLINIC | Age: 62
End: 2022-06-14
Payer: MEDICAID

## 2022-06-14 VITALS
RESPIRATION RATE: 16 BRPM | HEIGHT: 69 IN | DIASTOLIC BLOOD PRESSURE: 78 MMHG | HEART RATE: 86 BPM | OXYGEN SATURATION: 98 % | BODY MASS INDEX: 39.37 KG/M2 | WEIGHT: 265.8 LBS | TEMPERATURE: 97.5 F | SYSTOLIC BLOOD PRESSURE: 136 MMHG

## 2022-06-14 DIAGNOSIS — Z12.11 COLON CANCER SCREENING: ICD-10-CM

## 2022-06-14 DIAGNOSIS — M54.16 LUMBAR BACK PAIN WITH RADICULOPATHY AFFECTING RIGHT LOWER EXTREMITY: ICD-10-CM

## 2022-06-14 DIAGNOSIS — E78.2 MIXED HYPERLIPIDEMIA: ICD-10-CM

## 2022-06-14 DIAGNOSIS — F33.1 MODERATE EPISODE OF RECURRENT MAJOR DEPRESSIVE DISORDER (HCC): ICD-10-CM

## 2022-06-14 DIAGNOSIS — R73.03 PREDIABETES: ICD-10-CM

## 2022-06-14 DIAGNOSIS — I10 ESSENTIAL HYPERTENSION: Primary | ICD-10-CM

## 2022-06-14 PROCEDURE — 1036F TOBACCO NON-USER: CPT | Performed by: NURSE PRACTITIONER

## 2022-06-14 PROCEDURE — G8427 DOCREV CUR MEDS BY ELIG CLIN: HCPCS | Performed by: NURSE PRACTITIONER

## 2022-06-14 PROCEDURE — G8417 CALC BMI ABV UP PARAM F/U: HCPCS | Performed by: NURSE PRACTITIONER

## 2022-06-14 PROCEDURE — 99214 OFFICE O/P EST MOD 30 MIN: CPT | Performed by: NURSE PRACTITIONER

## 2022-06-14 PROCEDURE — 3017F COLORECTAL CA SCREEN DOC REV: CPT | Performed by: NURSE PRACTITIONER

## 2022-06-14 RX ORDER — BUPROPION HYDROCHLORIDE 150 MG/1
150 TABLET ORAL EVERY MORNING
Qty: 30 TABLET | Refills: 5 | Status: SHIPPED | OUTPATIENT
Start: 2022-06-14

## 2022-06-14 RX ORDER — LIRAGLUTIDE 6 MG/ML
INJECTION SUBCUTANEOUS
Qty: 2 PEN | Refills: 0 | Status: SHIPPED | OUTPATIENT
Start: 2022-06-14 | End: 2022-09-27

## 2022-06-14 SDOH — ECONOMIC STABILITY: FOOD INSECURITY: WITHIN THE PAST 12 MONTHS, YOU WORRIED THAT YOUR FOOD WOULD RUN OUT BEFORE YOU GOT MONEY TO BUY MORE.: OFTEN TRUE

## 2022-06-14 SDOH — ECONOMIC STABILITY: FOOD INSECURITY: WITHIN THE PAST 12 MONTHS, THE FOOD YOU BOUGHT JUST DIDN'T LAST AND YOU DIDN'T HAVE MONEY TO GET MORE.: OFTEN TRUE

## 2022-06-14 ASSESSMENT — ENCOUNTER SYMPTOMS
EYES NEGATIVE: 1
GASTROINTESTINAL NEGATIVE: 1
RESPIRATORY NEGATIVE: 1
BACK PAIN: 1

## 2022-06-14 ASSESSMENT — COLUMBIA-SUICIDE SEVERITY RATING SCALE - C-SSRS
6. HAVE YOU EVER DONE ANYTHING, STARTED TO DO ANYTHING, OR PREPARED TO DO ANYTHING TO END YOUR LIFE?: NO
2. HAVE YOU ACTUALLY HAD ANY THOUGHTS OF KILLING YOURSELF?: NO
1. WITHIN THE PAST MONTH, HAVE YOU WISHED YOU WERE DEAD OR WISHED YOU COULD GO TO SLEEP AND NOT WAKE UP?: NO

## 2022-06-14 ASSESSMENT — PATIENT HEALTH QUESTIONNAIRE - PHQ9
8. MOVING OR SPEAKING SO SLOWLY THAT OTHER PEOPLE COULD HAVE NOTICED. OR THE OPPOSITE, BEING SO FIGETY OR RESTLESS THAT YOU HAVE BEEN MOVING AROUND A LOT MORE THAN USUAL: 0
SUM OF ALL RESPONSES TO PHQ QUESTIONS 1-9: 20
2. FEELING DOWN, DEPRESSED OR HOPELESS: 3
1. LITTLE INTEREST OR PLEASURE IN DOING THINGS: 3
7. TROUBLE CONCENTRATING ON THINGS, SUCH AS READING THE NEWSPAPER OR WATCHING TELEVISION: 3
9. THOUGHTS THAT YOU WOULD BE BETTER OFF DEAD, OR OF HURTING YOURSELF: 0
SUM OF ALL RESPONSES TO PHQ9 QUESTIONS 1 & 2: 6
SUM OF ALL RESPONSES TO PHQ QUESTIONS 1-9: 20
SUM OF ALL RESPONSES TO PHQ QUESTIONS 1-9: 20
6. FEELING BAD ABOUT YOURSELF - OR THAT YOU ARE A FAILURE OR HAVE LET YOURSELF OR YOUR FAMILY DOWN: 3
4. FEELING TIRED OR HAVING LITTLE ENERGY: 3
10. IF YOU CHECKED OFF ANY PROBLEMS, HOW DIFFICULT HAVE THESE PROBLEMS MADE IT FOR YOU TO DO YOUR WORK, TAKE CARE OF THINGS AT HOME, OR GET ALONG WITH OTHER PEOPLE: 2
5. POOR APPETITE OR OVEREATING: 3
SUM OF ALL RESPONSES TO PHQ QUESTIONS 1-9: 20
3. TROUBLE FALLING OR STAYING ASLEEP: 2

## 2022-06-14 ASSESSMENT — SOCIAL DETERMINANTS OF HEALTH (SDOH): HOW HARD IS IT FOR YOU TO PAY FOR THE VERY BASICS LIKE FOOD, HOUSING, MEDICAL CARE, AND HEATING?: VERY HARD

## 2022-06-14 NOTE — PROGRESS NOTES
SUBJECTIVE:  Jay Eastman is a 63 y.o. male that presents with   Chief Complaint   Patient presents with    Hypertension     reg f/u    . HPI:    This is a 63year old white male who presents today for follow up with htn, hyperlipidemia and prediabetes. He is taking all of his medications and tolerating them well. Wants cologuard but had colonoscopy in 2017 and was supposed to repeat in 5 years so I am sending him to surgery for colonoscopy. He is agreeable. The only real abnormality was internal hemorrhoids but Dr. Kimmy Boyce did not clear him for 10 years he cleared him for 5 years ago it concerns me for him and his safety I do think that the colonoscopy would be better for him than just a Cologuard. He also is seeing an increase in the hemoglobin A1c was 6.4 last time and his he is morbidly obese I am going to put him on thick tablets and see if that will help with better control of his sugar as well as helping with some weight loss his blood pressure is good today he does continue to have back pain. He has no complaints of pain today. He is also having some significant issues with depression he brought it up he has got a 21 on the PDQ and his wife said that he has he was having some issues he is willing to try some Wellbutrin to see if that helps his kidneys see me back in 4 weeks at which time we will evaluate and possibly increase the dose on the Victoza and we will see how he is doing with the Wellbutrin. He does deny thoughts of hurting himself or anyone else he just has some depression based on his health and financial status. Hypertension  This is a chronic problem. The current episode started more than 1 year ago. The problem is unchanged. The problem is controlled. Associated symptoms include peripheral edema. There are no associated agents to hypertension. Risk factors for coronary artery disease include dyslipidemia, family history, male gender, obesity, stress and sedentary lifestyle.  Past treatments include ACE inhibitors. The current treatment provides significant improvement. Compliance problems include exercise. Back Pain  This is a chronic (sees pain management) problem. The current episode started more than 1 year ago. The problem occurs constantly. The pain is present in the lumbar spine, sacro-iliac, gluteal and thoracic spine. The quality of the pain is described as aching, burning, cramping and stabbing. The pain radiates to the left foot, left knee and left thigh. The pain is at a severity of 6/10. The pain is moderate. The pain is the same all the time. The symptoms are aggravated by bending, position and twisting. Stiffness is present all day, at night and in the morning. Associated symptoms include leg pain and paresis. Risk factors include lack of exercise and sedentary lifestyle. He has tried NSAIDs, home exercises, heat and analgesics for the symptoms. The treatment provided mild relief. Hyperlipidemia  This is a chronic problem. The current episode started more than 1 year ago. The problem is controlled. Recent lipid tests were reviewed and are normal. Factors aggravating his hyperlipidemia include fatty foods. Associated symptoms include leg pain and myalgias. Current antihyperlipidemic treatment includes statins and diet change. The current treatment provides moderate improvement of lipids. Compliance problems include adherence to exercise (very limited for exercise). Risk factors for coronary artery disease include dyslipidemia, family history, male sex, obesity, a sedentary lifestyle and stress. Review of Systems   Constitutional: Negative. HENT: Negative. Eyes: Negative. Respiratory: Negative. Cardiovascular: Positive for leg swelling. Gastrointestinal: Negative. Endocrine: Negative. Genitourinary: Negative. Musculoskeletal: Positive for arthralgias, back pain and myalgias. Skin: Negative. Neurological: Negative.     Hematological: Negative. Psychiatric/Behavioral: Negative. All other systems reviewed and are negative. OBJECTIVE:  /78   Pulse 86   Temp 97.5 °F (36.4 °C) (Infrared)   Resp 16   Ht 5' 9\" (1.753 m)   Wt 265 lb 12.8 oz (120.6 kg)   SpO2 98% Comment: ra  BMI 39.25 kg/m²   Physical Exam  Vitals and nursing note reviewed. Constitutional:       Appearance: Normal appearance. He is obese. HENT:      Head: Normocephalic and atraumatic. Eyes:      Conjunctiva/sclera: Conjunctivae normal.   Cardiovascular:      Rate and Rhythm: Normal rate and regular rhythm. Pulses: Normal pulses. Heart sounds: Normal heart sounds. Pulmonary:      Effort: Pulmonary effort is normal.      Breath sounds: Normal breath sounds. Musculoskeletal:      Cervical back: Normal range of motion and neck supple. Lumbar back: Tenderness present. Decreased range of motion. Right lower leg: Edema present. Left lower leg: Edema present. Skin:     General: Skin is warm and dry. Capillary Refill: Capillary refill takes less than 2 seconds. Neurological:      Mental Status: He is alert and oriented to person, place, and time. Psychiatric:         Mood and Affect: Mood normal.         Behavior: Behavior normal.         Thought Content: Thought content normal.         Judgment: Judgment normal.       No results found for requested labs within last 30 days. Hemoglobin A1C (%)   Date Value   03/14/2022 6.4 (H)     Microscopic Examination (no units)   Date Value   12/01/2021 Not Indicated     LDL Calculated (mg/dL)   Date Value   03/14/2022 98         Lab Results   Component Value Date    WBC 7.7 12/01/2021    NEUTROABS 2.8 12/05/2017    HGB 14.6 12/01/2021    HCT 45.4 12/01/2021    MCV 98.5 12/01/2021     12/01/2021       Lab Results   Component Value Date    TSH 1.52 03/10/2020         ASSESSMENT/PLAN:   Diagnosis Orders   1. Essential hypertension     2. Mixed hyperlipidemia     3.  Lumbar back pain with radiculopathy affecting right lower extremity     4. Moderate episode of recurrent major depressive disorder ([facility] [address]     5. Prediabetes     6. Colon cancer screening  External Referral To General Surgery           Orders Placed This Encounter   Procedures    External Referral To General Surgery     Referral Priority:   Routine     Referral Type:   Eval and Treat     Referral Reason:   Specialty Services Required     Requested Specialty:   General Surgery     Number of Visits Requested:   1        Outpatient Encounter Medications as of 6/14/2022   Medication Sig Dispense Refill    buPROPion (WELLBUTRIN XL) 150 MG extended release tablet Take 1 tablet by mouth every morning 30 tablet 5    Liraglutide (VICTOZA) 18 MG/3ML SOPN SC injection Inject 0.6 mg into the skin daily for 7 days, THEN 1.2 mg daily for 7 days, THEN 1.8 mg daily for 14 days.  2 pen 0    metoprolol succinate (TOPROL XL) 50 MG extended release tablet Take 1 tablet by mouth daily 30 tablet 11    atorvastatin (LIPITOR) 20 MG tablet Take 1 tablet by mouth daily 30 tablet 11    Cholecalciferol (VITAMIN D3) 1.25 MG (33040 UT) CAPS TAKE 1 CAPSULE BY MOUTH EVERY WEEK 8 capsule 11    escitalopram (LEXAPRO) 20 MG tablet Take 1 tablet by mouth daily 60 tablet 11    bumetanide (BUMEX) 2 MG tablet TAKE ONE TABLET BY MOUTH EVERY MORNING 60 tablet 11    rOPINIRole (REQUIP) 2 MG tablet TAKE 1 TABLET BY MOUTH AT BEDTIME FOR RESTLESS LEGS 60 tablet 11    aspirin (ASPIRIN ADULT LOW STRENGTH) 81 MG EC tablet TAKE 1 TABLET BY MOUTH DAILY 60 tablet 11    pantoprazole (PROTONIX) 40 MG tablet TAKE ONE TABLET BY MOUTH DAILY FOR STOMACH 60 tablet 11    rosuvastatin (CRESTOR) 20 MG tablet Take 1 tablet by mouth nightly 30 tablet 11    fluticasone (FLONASE) 50 MCG/ACT nasal spray 2 sprays by Nasal route nightly as needed for Rhinitis or Allergies 1 each 11    lactulose (CHRONULAC) 10 GM/15ML solution Take 15 mLs by mouth nightly 450 mL 11    sildenafil (VIAGRA) 100 MG tablet Take 1 tablet by mouth as needed for Erectile Dysfunction 30 tablet 0    metOLazone (ZAROXOLYN) 2.5 MG tablet Take 1 tablet by mouth every other day      brimonidine (ALPHAGAN) 0.2 % ophthalmic solution       latanoprost (XALATAN) 0.005 % ophthalmic solution       [DISCONTINUED] vitamin D (ERGOCALCIFEROL) 1.25 MG (19348 UT) CAPS capsule take 1 capsule by mouth every week 4 capsule 11     No facility-administered encounter medications on file as of 6/14/2022. Return in about 4 weeks (around 7/12/2022), or if symptoms worsen or fail to improve. PATIENT COUNSELING    Counseling was provided today regardingthe following topics: Healthy eating habits, Regular exercise, substance abuse and healthy sleep habits. Discussed use, benefit, and side effectsof prescribed medications. Barriers to medication compliance addressed. All patient questions answered. Pt voiced understanding.

## 2022-06-15 NOTE — TELEPHONE ENCOUNTER
Edy Fitzpatrick called and was wondering if it was ok for Simba Avery to take the Lexapro and wellbutrin together?

## 2022-06-17 ENCOUNTER — TELEPHONE (OUTPATIENT)
Dept: FAMILY MEDICINE CLINIC | Age: 62
End: 2022-06-17

## 2022-09-27 ENCOUNTER — OFFICE VISIT (OUTPATIENT)
Dept: FAMILY MEDICINE CLINIC | Age: 62
End: 2022-09-27
Payer: MEDICAID

## 2022-09-27 ENCOUNTER — HOSPITAL ENCOUNTER (OUTPATIENT)
Facility: HOSPITAL | Age: 62
Discharge: HOME | End: 2022-09-27
Payer: MEDICAID

## 2022-09-27 VITALS
WEIGHT: 253.2 LBS | HEIGHT: 69 IN | HEART RATE: 72 BPM | BODY MASS INDEX: 37.5 KG/M2 | RESPIRATION RATE: 18 BRPM | OXYGEN SATURATION: 97 % | DIASTOLIC BLOOD PRESSURE: 72 MMHG | TEMPERATURE: 97.7 F | SYSTOLIC BLOOD PRESSURE: 128 MMHG

## 2022-09-27 DIAGNOSIS — E78.2 MIXED HYPERLIPIDEMIA: ICD-10-CM

## 2022-09-27 DIAGNOSIS — R73.03 PREDIABETES: ICD-10-CM

## 2022-09-27 DIAGNOSIS — M54.16 LUMBAR BACK PAIN WITH RADICULOPATHY AFFECTING RIGHT LOWER EXTREMITY: ICD-10-CM

## 2022-09-27 DIAGNOSIS — I10 ESSENTIAL HYPERTENSION: ICD-10-CM

## 2022-09-27 DIAGNOSIS — R60.1 ANASARCA: ICD-10-CM

## 2022-09-27 DIAGNOSIS — G62.9 PERIPHERAL POLYNEUROPATHY: ICD-10-CM

## 2022-09-27 DIAGNOSIS — Z23 FLU VACCINE NEED: ICD-10-CM

## 2022-09-27 DIAGNOSIS — R60.0 LEG EDEMA, LEFT: ICD-10-CM

## 2022-09-27 DIAGNOSIS — Z12.11 SCREENING FOR COLON CANCER: ICD-10-CM

## 2022-09-27 DIAGNOSIS — I10 ESSENTIAL HYPERTENSION: Primary | ICD-10-CM

## 2022-09-27 LAB
A/G RATIO: 1.4 (ref 0.8–2)
ALBUMIN SERPL-MCNC: 4.2 G/DL (ref 3.4–4.8)
ALP BLD-CCNC: 128 U/L (ref 25–100)
ALT SERPL-CCNC: 25 U/L (ref 4–36)
ANION GAP SERPL CALCULATED.3IONS-SCNC: 11 MMOL/L (ref 3–16)
AST SERPL-CCNC: 23 U/L (ref 8–33)
BILIRUB SERPL-MCNC: 0.3 MG/DL (ref 0.3–1.2)
BUN BLDV-MCNC: 13 MG/DL (ref 6–20)
CALCIUM SERPL-MCNC: 10.2 MG/DL (ref 8.5–10.5)
CHLORIDE BLD-SCNC: 97 MMOL/L (ref 98–107)
CHOLESTEROL, TOTAL: 211 MG/DL (ref 0–200)
CO2: 31 MMOL/L (ref 20–30)
CREAT SERPL-MCNC: 1.2 MG/DL (ref 0.4–1.2)
GFR AFRICAN AMERICAN: >59
GFR NON-AFRICAN AMERICAN: >59
GLOBULIN: 2.9 G/DL
GLUCOSE BLD-MCNC: 119 MG/DL (ref 74–106)
HBA1C MFR BLD: 6.1 %
HDLC SERPL-MCNC: 39 MG/DL (ref 40–60)
LDL CHOLESTEROL CALCULATED: 108 MG/DL
POTASSIUM SERPL-SCNC: 4 MMOL/L (ref 3.4–5.1)
SODIUM BLD-SCNC: 139 MMOL/L (ref 136–145)
TOTAL PROTEIN: 7.1 G/DL (ref 6.4–8.3)
TRIGL SERPL-MCNC: 318 MG/DL (ref 0–249)
VLDLC SERPL CALC-MCNC: 64 MG/DL

## 2022-09-27 PROCEDURE — 99214 OFFICE O/P EST MOD 30 MIN: CPT | Performed by: NURSE PRACTITIONER

## 2022-09-27 PROCEDURE — 90674 CCIIV4 VAC NO PRSV 0.5 ML IM: CPT | Performed by: NURSE PRACTITIONER

## 2022-09-27 PROCEDURE — 36415 COLL VENOUS BLD VENIPUNCTURE: CPT

## 2022-09-27 PROCEDURE — 83036 HEMOGLOBIN GLYCOSYLATED A1C: CPT

## 2022-09-27 PROCEDURE — 90471 IMMUNIZATION ADMIN: CPT | Performed by: NURSE PRACTITIONER

## 2022-09-27 PROCEDURE — 80053 COMPREHEN METABOLIC PANEL: CPT

## 2022-09-27 PROCEDURE — 80061 LIPID PANEL: CPT

## 2022-09-27 PROCEDURE — 1036F TOBACCO NON-USER: CPT | Performed by: NURSE PRACTITIONER

## 2022-09-27 PROCEDURE — G8417 CALC BMI ABV UP PARAM F/U: HCPCS | Performed by: NURSE PRACTITIONER

## 2022-09-27 PROCEDURE — G8427 DOCREV CUR MEDS BY ELIG CLIN: HCPCS | Performed by: NURSE PRACTITIONER

## 2022-09-27 PROCEDURE — 3017F COLORECTAL CA SCREEN DOC REV: CPT | Performed by: NURSE PRACTITIONER

## 2022-09-27 NOTE — PROGRESS NOTES
Chief Complaint   Patient presents with    Hypertension     Follow up       Have you seen any other physician or provider since your last visit no    Have you had any other diagnostic tests since your last visit? no    Have you changed or stopped any medications since your last visit? no     Colonoscopy to be scheduled, problem with physician at last one.

## 2022-09-27 NOTE — PROGRESS NOTES
Immunizations Administered       Name Date Dose Route    Influenza, FLUCELVAX, (age 10 mo+), MDCK, PF, 0.5mL 9/27/2022 0.5 mL Intramuscular    Site: Deltoid- Left    Lot: [id number]    NDC: 35859-177-21          Vaccine Information Sheet, \"Influenza - Inactivated\"  given to Tanya Arnold, or parent/legal guardian of  Tanya Arnold and verbalized understanding. Patient responses:    Have you ever had a reaction to a flu vaccine? No  Do you have any current illness? No  Have you ever had [place] Syndrome? No  Do you have a serious allergy to any of the follow: Neomycin, Polymyxin, Thimerosal, eggs or egg products? NoPatient tolerated injection well. Patient advised to wait 20 minutes in the office following the injection. No signs/symptoms of reaction noted after 20 minutes. Flu vaccine given per order. Please see immunization tab. Risks and benefits explained. Current VIS given.       Immunizations Administered       Name Date Dose Route    Influenza, FLUCELVAX, (age 10 mo+), MDCK, PF, 0.5mL 9/27/2022 0.5 mL Intramuscular    Site: Deltoid- Left    Lot: [id number]    NDC: 35308-359-08

## 2022-09-30 ASSESSMENT — ENCOUNTER SYMPTOMS
EYES NEGATIVE: 1
BACK PAIN: 1
RESPIRATORY NEGATIVE: 1
GASTROINTESTINAL NEGATIVE: 1

## 2022-09-30 NOTE — PROGRESS NOTES
SUBJECTIVE:  Satish Narvaez is a 63 y.o. male that presents with   Chief Complaint   Patient presents with    Hypertension     Follow up   . HPI:    This is a 63year old white male who presents today for follow up with htn, hyperlipidemia anasarca low back pain and prediabetes. He tells me that he is taking all of his medications tolerating everything well he is not getting any help with pain at this time he does have low back pain with radiculopathy. He does not have a past medical history significant for drug or alcohol abuse. He lives with his wife. They live in a home that is by one of their children. He denies chest pain or unusual shortness of breath. He does also see nephrology. He does have prediabetes so we will be checking that as well. Hypertension  This is a chronic problem. The current episode started more than 1 year ago. The problem is unchanged. The problem is controlled. Associated symptoms include peripheral edema. There are no associated agents to hypertension. Risk factors for coronary artery disease include dyslipidemia, family history, male gender, obesity, stress and sedentary lifestyle. Past treatments include ACE inhibitors. The current treatment provides significant improvement. Compliance problems include exercise. Back Pain  This is a chronic (sees pain management) problem. The current episode started more than 1 year ago. The problem occurs constantly. The pain is present in the lumbar spine, sacro-iliac, gluteal and thoracic spine. The quality of the pain is described as aching, burning, cramping and stabbing. The pain radiates to the left foot, left knee and left thigh. The pain is at a severity of 6/10. The pain is moderate. The pain is The same all the time. The symptoms are aggravated by bending, position and twisting. Stiffness is present All day, at night and in the morning. Associated symptoms include leg pain and paresis.  Risk factors include lack of exercise and sedentary lifestyle. He has tried NSAIDs, home exercises, heat and analgesics for the symptoms. The treatment provided mild relief. Hyperlipidemia  This is a chronic problem. The current episode started more than 1 year ago. The problem is controlled. Recent lipid tests were reviewed and are normal. Factors aggravating his hyperlipidemia include fatty foods. Associated symptoms include leg pain and myalgias. Current antihyperlipidemic treatment includes statins and diet change. The current treatment provides moderate improvement of lipids. Compliance problems include adherence to exercise (very limited for exercise). Risk factors for coronary artery disease include dyslipidemia, family history, male sex, obesity, a sedentary lifestyle and stress. Review of Systems   Constitutional: Negative. HENT: Negative. Eyes: Negative. Respiratory: Negative. Cardiovascular:  Positive for leg swelling. Gastrointestinal: Negative. Endocrine: Negative. Genitourinary: Negative. Musculoskeletal:  Positive for arthralgias, back pain and myalgias. Skin: Negative. Neurological: Negative. Hematological: Negative. Psychiatric/Behavioral: Negative. All other systems reviewed and are negative. OBJECTIVE:  /72   Pulse 72   Temp 97.7 °F (36.5 °C) (Infrared)   Resp 18   Ht 5' 9\" (1.753 m)   Wt 253 lb 3.2 oz (114.9 kg)   SpO2 97% Comment: ra  BMI 37.39 kg/m²   Physical Exam  Vitals and nursing note reviewed. Constitutional:       Appearance: Normal appearance. He is obese. HENT:      Head: Normocephalic and atraumatic. Eyes:      Conjunctiva/sclera: Conjunctivae normal.   Cardiovascular:      Rate and Rhythm: Normal rate and regular rhythm. Pulses: Normal pulses. Heart sounds: Normal heart sounds. Pulmonary:      Effort: Pulmonary effort is normal.      Breath sounds: Normal breath sounds.    Musculoskeletal:      Cervical back: Normal range of motion and neck supple. Lumbar back: Tenderness present. Decreased range of motion. Right lower leg: Edema present. Left lower leg: Edema present. Skin:     General: Skin is warm and dry. Capillary Refill: Capillary refill takes less than 2 seconds. Neurological:      Mental Status: He is alert and oriented to person, place, and time. Psychiatric:         Mood and Affect: Mood normal.         Behavior: Behavior normal.         Thought Content:  Thought content normal.         Judgment: Judgment normal.     Results in Past 30 Days  Result Component Current Result Ref Range Previous Result Ref Range   Albumin/Globulin Ratio 1.4 (9/27/2022) 0.8 - 2.0 Not in Time Range    Albumin 4.2 (9/27/2022) 3.4 - 4.8 g/dL Not in Time Range    Alkaline Phosphatase 128 (H) (9/27/2022) 25 - 100 U/L Not in Time Range    ALT 25 (9/27/2022) 4 - 36 U/L Not in Time Range    AST 23 (9/27/2022) 8 - 33 U/L Not in Time Range    BUN 13 (9/27/2022) 6 - 20 mg/dL Not in Time Range    Calcium 10.2 (9/27/2022) 8.5 - 10.5 mg/dL Not in Time Range    Chloride 97 (L) (9/27/2022) 98 - 107 mmol/L Not in Time Range    CO2 31 (H) (9/27/2022) 20 - 30 mmol/L Not in Time Range    Creatinine 1.2 (9/27/2022) 0.4 - 1.2 mg/dL Not in Time Range    GFR  >59 (9/27/2022) >59 Not in Time Range    GFR Non- >59 (9/27/2022) >59 Not in Time Range    Globulin 2.9 (9/27/2022) Not Established g/dL Not in Time Range    Glucose 119 (H) (9/27/2022) 74 - 106 mg/dL Not in Time Range    Potassium 4.0 (9/27/2022) 3.4 - 5.1 mmol/L Not in Time Range    Sodium 139 (9/27/2022) 136 - 145 mmol/L Not in Time Range    Total Bilirubin 0.3 (9/27/2022) 0.3 - 1.2 mg/dL Not in Time Range    Total Protein 7.1 (9/27/2022) 6.4 - 8.3 g/dL Not in Time Range        Hemoglobin A1C (%)   Date Value   09/27/2022 6.1 (H)     Microscopic Examination (no units)   Date Value   12/01/2021 Not Indicated     LDL Calculated (mg/dL)   Date Value   09/27/2022 108 Lab Results   Component Value Date/Time    WBC 7.7 12/01/2021 12:35 PM    NEUTROABS 2.8 12/05/2017 11:00 AM    HGB 14.6 12/01/2021 12:35 PM    HCT 45.4 12/01/2021 12:35 PM    MCV 98.5 12/01/2021 12:35 PM     12/01/2021 12:35 PM       Lab Results   Component Value Date    TSH 1.52 03/10/2020         ASSESSMENT/PLAN:   Diagnosis Orders   1. Essential hypertension  Comprehensive Metabolic Panel      2. Mixed hyperlipidemia  Lipid Panel      3. Prediabetes  Hemoglobin A1C      4. Lumbar back pain with radiculopathy affecting right lower extremity        5. Leg edema, left        6. Peripheral polyneuropathy        7. Screening for colon cancer  External Referral To General Surgery      8. Flu vaccine need  Influenza, FLUCELVAX, (age 10 mo+), IM, Preservative Free, 0.5 mL      9.  Anasarca                Orders Placed This Encounter   Procedures    Influenza, FLUCELVAX, (age 10 mo+), IM, Preservative Free, 0.5 mL    Hemoglobin A1C     Standing Status:   Future     Number of Occurrences:   1     Standing Expiration Date:   9/27/2023    Comprehensive Metabolic Panel     Standing Status:   Future     Number of Occurrences:   1     Standing Expiration Date:   9/27/2023    Lipid Panel     Standing Status:   Future     Number of Occurrences:   1     Standing Expiration Date:   9/27/2023     Order Specific Question:   Is Patient Fasting?/# of Hours     Answer:   6    External Referral To General Surgery     Referral Priority:   Routine     Referral Type:   Eval and Treat     Referral Reason:   Specialty Services Required     Requested Specialty:   General Surgery     Number of Visits Requested:   1        Outpatient Encounter Medications as of 9/27/2022   Medication Sig Dispense Refill    buPROPion (WELLBUTRIN XL) 150 MG extended release tablet Take 1 tablet by mouth every morning 30 tablet 5    Liraglutide (VICTOZA) 18 MG/3ML SOPN SC injection Inject 0.6 mg into the skin daily for 7 days, THEN 1.2 mg daily for 7 days,

## 2022-10-07 ENCOUNTER — TELEPHONE (OUTPATIENT)
Dept: SURGERY | Facility: CLINIC | Age: 62
End: 2022-10-07

## 2022-10-07 NOTE — TELEPHONE ENCOUNTER
"Tried to call patient, no answer, recording stated \"The person you are trying to call is not accepting calls at this time\".  "

## 2022-10-11 ENCOUNTER — TELEPHONE (OUTPATIENT)
Dept: SURGERY | Facility: CLINIC | Age: 62
End: 2022-10-11

## 2022-10-14 NOTE — TELEPHONE ENCOUNTER
"Tried to call patient, \"the number your trying to call is not accepting calls at this time, mailed letter.  "

## 2022-11-01 ENCOUNTER — OFFICE VISIT (OUTPATIENT)
Dept: SURGERY | Facility: CLINIC | Age: 62
End: 2022-11-01

## 2022-11-01 VITALS
SYSTOLIC BLOOD PRESSURE: 116 MMHG | DIASTOLIC BLOOD PRESSURE: 75 MMHG | RESPIRATION RATE: 18 BRPM | BODY MASS INDEX: 37.84 KG/M2 | HEART RATE: 87 BPM | HEIGHT: 69 IN | OXYGEN SATURATION: 96 % | WEIGHT: 255.5 LBS

## 2022-11-01 DIAGNOSIS — R10.13 EPIGASTRIC ABDOMINAL PAIN: Primary | ICD-10-CM

## 2022-11-01 PROCEDURE — 99203 OFFICE O/P NEW LOW 30 MIN: CPT | Performed by: STUDENT IN AN ORGANIZED HEALTH CARE EDUCATION/TRAINING PROGRAM

## 2022-11-01 RX ORDER — LIRAGLUTIDE 6 MG/ML
INJECTION SUBCUTANEOUS
COMMUNITY
Start: 2022-06-14

## 2022-11-01 RX ORDER — ATORVASTATIN CALCIUM 20 MG/1
TABLET, FILM COATED ORAL
COMMUNITY
Start: 2022-10-05

## 2022-11-01 RX ORDER — BUPROPION HYDROCHLORIDE 150 MG/1
TABLET ORAL
COMMUNITY
Start: 2022-10-05

## 2022-11-01 RX ORDER — FLUTICASONE PROPIONATE 50 MCG
SPRAY, SUSPENSION (ML) NASAL
COMMUNITY
Start: 2022-10-05

## 2022-11-01 RX ORDER — LATANOPROST 50 UG/ML
SOLUTION/ DROPS OPHTHALMIC
COMMUNITY
Start: 2022-10-28

## 2022-11-01 RX ORDER — ERGOCALCIFEROL 1.25 MG/1
CAPSULE ORAL
COMMUNITY
Start: 2022-10-05

## 2022-11-01 RX ORDER — METOPROLOL SUCCINATE 50 MG/1
TABLET, EXTENDED RELEASE ORAL
COMMUNITY
Start: 2022-10-05

## 2022-11-04 ENCOUNTER — TELEPHONE (OUTPATIENT)
Dept: SURGERY | Facility: CLINIC | Age: 62
End: 2022-11-04

## 2022-11-04 NOTE — TELEPHONE ENCOUNTER
I sent a request for authorization CT I also faxed Dr. Sales's note, CMP, Hemoglobin, & lipid. [facility] denied the CT stating there needed to be imaging x-ray , US ... or labs supporting that the patient needs the CT. Refernce # [id number]. [facility] [phone].

## 2022-11-08 DIAGNOSIS — R10.13 EPIGASTRIC PAIN: Primary | ICD-10-CM

## 2022-11-09 ENCOUNTER — HOSPITAL ENCOUNTER (OUTPATIENT)
Dept: ULTRASOUND IMAGING | Facility: HOSPITAL | Age: 62
Discharge: HOME | End: 2022-11-09
Payer: MEDICAID

## 2022-11-09 DIAGNOSIS — K43.9 ABDOMINAL WALL HERNIA: ICD-10-CM

## 2022-11-09 DIAGNOSIS — R10.13 ABDOMINAL PAIN, EPIGASTRIC: ICD-10-CM

## 2022-11-09 PROCEDURE — 76705 ECHO EXAM OF ABDOMEN: CPT

## 2022-11-17 DIAGNOSIS — R10.13 EPIGASTRIC ABDOMINAL PAIN: Primary | ICD-10-CM

## 2022-11-22 ENCOUNTER — TELEPHONE (OUTPATIENT)
Dept: SURGERY | Facility: CLINIC | Age: 62
End: 2022-11-22

## 2022-11-22 DIAGNOSIS — R10.13 EPIGASTRIC ABDOMINAL PAIN: Primary | ICD-10-CM

## 2022-11-22 NOTE — TELEPHONE ENCOUNTER
Hub staff attempted to follow warm transfer process and was unsuccessful     Caller: MAR  Relationship to patient: SPOUSE    Best call back number: [phone]  Patient is needing: PT WAS CALLING TO SEE IF HE NEEDED APPT TODAY, HE WAS NOT ABLE TO GET US OR CT SCAN DONE.

## 2022-11-22 NOTE — TELEPHONE ENCOUNTER
Called [facility] for a Peer to Peer CT scan verification. Case # [id number]. Dr. Sales done a peer to peer and [facility] approved CT scan abdomen pelvis. Verification #  [id number] valid 1/16/2023.

## 2022-12-02 ENCOUNTER — HOSPITAL ENCOUNTER (OUTPATIENT)
Dept: CT IMAGING | Facility: HOSPITAL | Age: 62
Discharge: HOME | End: 2022-12-02
Payer: MEDICAID

## 2022-12-02 DIAGNOSIS — R10.13 EPIGASTRIC PAIN: ICD-10-CM

## 2022-12-02 PROCEDURE — 74176 CT ABD & PELVIS W/O CONTRAST: CPT

## 2022-12-06 NOTE — PROGRESS NOTES
Patient: Edgar Mendez  YOB: 1960    Date: 12/08/2022    Primary Care Provider: Tonia Gaona APRN    Chief Complaint   Patient presents with   • Follow-up     C/T       History: Patient is here for a follow up CT abdomen and pelvis. CT abdomen and pelvis did show small hiatal hernia. Severe fatty infiltration the liver. Anterior abdominal wall hernias containing fat in the epigastric region at the umbilicus and at the inguinal canals. He reports continued epigastric pain especially with pressure on his abdominal wall. He continues to have fluctuations in his bowel movements from diarrhea to constipation.     The following portions of the patient's history were reviewed and updated as appropriate: allergies, current medications, past family history, past medical history, past social history, past surgical history and problem list.    Review of Systems   Constitutional: Negative for chills, fever and unexpected weight change.   HENT: Negative for trouble swallowing and voice change.    Eyes: Negative for visual disturbance.   Respiratory: Negative for apnea, cough, chest tightness, shortness of breath and wheezing.    Cardiovascular: Negative for chest pain, palpitations and leg swelling.   Gastrointestinal: Negative for abdominal distention, abdominal pain, anal bleeding, blood in stool, constipation, diarrhea, nausea, rectal pain and vomiting.   Endocrine: Negative for cold intolerance and heat intolerance.   Genitourinary: Negative for difficulty urinating, dysuria, flank pain, scrotal swelling and testicular pain.   Musculoskeletal: Negative for back pain, gait problem and joint swelling.   Skin: Negative for color change, rash and wound.   Neurological: Negative for dizziness, syncope, speech difficulty, weakness, numbness and headaches.   Hematological: Negative for adenopathy. Does not bruise/bleed easily.   Psychiatric/Behavioral: Negative for confusion. The patient is not nervous/anxious.  "       Vital Signs  Vitals:    12/08/22 0842   BP: 126/82   Pulse: (!) 124   Temp: 98.5 °F (36.9 °C)   SpO2: 96%   Weight: 119 kg (262 lb 6.4 oz)   Height: 175.3 cm (69\")       Allergies:  No Known Allergies    Medications:    Current Outpatient Medications:   •  aspirin 81 MG chewable tablet, Chew 81 mg., Disp: , Rfl:   •  Aspirin Low Dose 81 MG EC tablet, , Disp: , Rfl:   •  atorvastatin (LIPITOR) 20 MG tablet, , Disp: , Rfl:   •  bumetanide (BUMEX) 2 MG tablet, Take 2 mg by mouth Daily., Disp: , Rfl: 0  •  buPROPion XL (WELLBUTRIN XL) 150 MG 24 hr tablet, , Disp: , Rfl:   •  escitalopram (LEXAPRO) 10 MG tablet, Take 10 mg by mouth., Disp: , Rfl:   •  escitalopram (LEXAPRO) 20 MG tablet, , Disp: , Rfl:   •  fluticasone (FLONASE) 50 MCG/ACT nasal spray, , Disp: , Rfl:   •  lactulose (CHRONULAC) 10 GM/15ML solution, , Disp: , Rfl:   •  latanoprost (XALATAN) 0.005 % ophthalmic solution, , Disp: , Rfl:   •  Liraglutide (Victoza) 18 MG/3ML solution pen-injector injection, Inject  under the skin into the appropriate area as directed., Disp: , Rfl:   •  lisinopril (PRINIVIL,ZESTRIL) 10 MG tablet, take 1 tablet by mouth once daily, Disp: , Rfl:   •  LORazepam (ATIVAN) 0.5 MG tablet, Take 0.5 mg by mouth Every 8 (Eight) Hours As Needed for Anxiety., Disp: , Rfl: 0  •  metoprolol succinate XL (TOPROL-XL) 50 MG 24 hr tablet, , Disp: , Rfl:   •  Morphine (MSIR) 15 MG tablet, Take 15 mg by mouth., Disp: , Rfl:   •  oxyCODONE (ROXICODONE) 10 MG tablet, , Disp: , Rfl: 0  •  pantoprazole (PROTONIX) 40 MG EC tablet, , Disp: , Rfl: 0  •  rOPINIRole (REQUIP) 2 MG tablet, , Disp: , Rfl: 0  •  vitamin D (ERGOCALCIFEROL) 1.25 MG (68542 UT) capsule capsule, , Disp: , Rfl:     Physical Exam:   General Appearance:    Alert, cooperative, in no acute distress   Head:    Normocephalic, without obvious abnormality, atraumatic   Lungs:     Clear to auscultation,respirations regular, even and                  unlabored    Heart:    Regular " rhythm and normal rate, normal S1 and S2, no            murmur, no gallop, no rub, no click   Abdomen:     Obese, normal bowel sounds, no masses, no organomegaly, soft, mildly tender to palpation in epigastrium, non-distended, no guarding, no rebound                tenderness   Extremities:   Moves all extremities well, no edema, no cyanosis, no             redness   Pulses:   Pulses palpable and equal bilaterally   Skin:   No bleeding, bruising or rash     Results Review:   I reviewed the patient's new imaging results and agree with the interpretation.     Review of Systems was reviewed and confirmed as accurate as documented by the MA.    ASSESSMENT/PLAN:    1. Encounter for screening colonoscopy    2. Epigastric abdominal pain    3. Ventral hernia without obstruction or gangrene       Will schedule patient for colonoscopy as his last colonoscopy was over 5 years ago, he does not recall what was found at that time, and he has a change in his bowel habits. Patient also has a history of multiple ventral hernia repairs with mesh secondary to history of trauma. Will refer to Dr. Yoder for likely complicated ventral hernia repair and possible need for reconstruction. Discussed with patient and significant other and he expresses understanding. All questions answered.     Electronically signed by Yanely Sales DO  12/08/22

## 2022-12-08 ENCOUNTER — OFFICE VISIT (OUTPATIENT)
Dept: SURGERY | Facility: CLINIC | Age: 62
End: 2022-12-08

## 2022-12-08 VITALS
HEART RATE: 124 BPM | HEIGHT: 69 IN | DIASTOLIC BLOOD PRESSURE: 82 MMHG | WEIGHT: 262.4 LBS | TEMPERATURE: 98.5 F | BODY MASS INDEX: 38.86 KG/M2 | OXYGEN SATURATION: 96 % | SYSTOLIC BLOOD PRESSURE: 126 MMHG

## 2022-12-08 DIAGNOSIS — K43.9 VENTRAL HERNIA WITHOUT OBSTRUCTION OR GANGRENE: ICD-10-CM

## 2022-12-08 DIAGNOSIS — Z12.11 ENCOUNTER FOR SCREENING COLONOSCOPY: Primary | ICD-10-CM

## 2022-12-08 DIAGNOSIS — R10.13 EPIGASTRIC ABDOMINAL PAIN: ICD-10-CM

## 2022-12-08 PROCEDURE — 99213 OFFICE O/P EST LOW 20 MIN: CPT | Performed by: STUDENT IN AN ORGANIZED HEALTH CARE EDUCATION/TRAINING PROGRAM

## 2022-12-08 RX ORDER — ASPIRIN 81 MG/1
TABLET, COATED ORAL
COMMUNITY
Start: 2022-11-04

## 2022-12-08 RX ORDER — ESCITALOPRAM OXALATE 20 MG/1
TABLET ORAL
COMMUNITY
Start: 2022-11-04

## 2022-12-08 RX ORDER — LACTULOSE 10 G/15ML
SOLUTION ORAL
COMMUNITY
Start: 2022-11-04

## 2022-12-08 RX ORDER — BUPROPION HYDROCHLORIDE 150 MG/1
TABLET ORAL
Qty: 30 TABLET | Refills: 5 | Status: SHIPPED | OUTPATIENT
Start: 2022-12-08

## 2023-01-12 ENCOUNTER — TELEPHONE (OUTPATIENT)
Dept: SURGERY | Facility: CLINIC | Age: 63
End: 2023-01-12
Payer: COMMERCIAL

## 2023-01-17 ENCOUNTER — ANESTHESIA EVENT (OUTPATIENT)
Dept: ENDOSCOPY | Facility: HOSPITAL | Age: 63
End: 2023-01-17
Payer: MEDICAID

## 2023-01-17 ENCOUNTER — OUTSIDE FACILITY SERVICE (OUTPATIENT)
Dept: SURGERY | Facility: CLINIC | Age: 63
End: 2023-01-17
Payer: COMMERCIAL

## 2023-01-17 ENCOUNTER — ANESTHESIA (OUTPATIENT)
Dept: ENDOSCOPY | Facility: HOSPITAL | Age: 63
End: 2023-01-17
Payer: MEDICAID

## 2023-01-17 ENCOUNTER — HOSPITAL ENCOUNTER (OUTPATIENT)
Facility: HOSPITAL | Age: 63
Setting detail: OUTPATIENT SURGERY
Discharge: HOME | End: 2023-01-17
Attending: STUDENT IN AN ORGANIZED HEALTH CARE EDUCATION/TRAINING PROGRAM | Admitting: STUDENT IN AN ORGANIZED HEALTH CARE EDUCATION/TRAINING PROGRAM
Payer: MEDICAID

## 2023-01-17 VITALS
WEIGHT: 251 LBS | TEMPERATURE: 97.7 F | HEART RATE: 76 BPM | RESPIRATION RATE: 18 BRPM | SYSTOLIC BLOOD PRESSURE: 128 MMHG | HEIGHT: 69 IN | BODY MASS INDEX: 37.18 KG/M2 | DIASTOLIC BLOOD PRESSURE: 85 MMHG | OXYGEN SATURATION: 94 %

## 2023-01-17 PROCEDURE — 3609027000 HC COLONOSCOPY: Performed by: STUDENT IN AN ORGANIZED HEALTH CARE EDUCATION/TRAINING PROGRAM

## 2023-01-17 PROCEDURE — 6360000002 HC RX W HCPCS: Performed by: NURSE ANESTHETIST, CERTIFIED REGISTERED

## 2023-01-17 PROCEDURE — 7100000010 HC PHASE II RECOVERY - FIRST 15 MIN: Performed by: STUDENT IN AN ORGANIZED HEALTH CARE EDUCATION/TRAINING PROGRAM

## 2023-01-17 PROCEDURE — 3700000001 HC ADD 15 MINUTES (ANESTHESIA): Performed by: STUDENT IN AN ORGANIZED HEALTH CARE EDUCATION/TRAINING PROGRAM

## 2023-01-17 PROCEDURE — 7100000011 HC PHASE II RECOVERY - ADDTL 15 MIN: Performed by: STUDENT IN AN ORGANIZED HEALTH CARE EDUCATION/TRAINING PROGRAM

## 2023-01-17 PROCEDURE — 3700000000 HC ANESTHESIA ATTENDED CARE: Performed by: STUDENT IN AN ORGANIZED HEALTH CARE EDUCATION/TRAINING PROGRAM

## 2023-01-17 PROCEDURE — 2580000003 HC RX 258: Performed by: STUDENT IN AN ORGANIZED HEALTH CARE EDUCATION/TRAINING PROGRAM

## 2023-01-17 PROCEDURE — 45378 DIAGNOSTIC COLONOSCOPY: CPT | Performed by: STUDENT IN AN ORGANIZED HEALTH CARE EDUCATION/TRAINING PROGRAM

## 2023-01-17 PROCEDURE — 2500000003 HC RX 250 WO HCPCS: Performed by: NURSE ANESTHETIST, CERTIFIED REGISTERED

## 2023-01-17 RX ORDER — SODIUM CHLORIDE, SODIUM LACTATE, POTASSIUM CHLORIDE, CALCIUM CHLORIDE 600; 310; 30; 20 MG/100ML; MG/100ML; MG/100ML; MG/100ML
INJECTION, SOLUTION INTRAVENOUS CONTINUOUS
Status: DISCONTINUED | OUTPATIENT
Start: 2023-01-17 | End: 2023-01-17 | Stop reason: HOSPADM

## 2023-01-17 RX ORDER — PROPOFOL 10 MG/ML
INJECTION, EMULSION INTRAVENOUS PRN
Status: DISCONTINUED | OUTPATIENT
Start: 2023-01-17 | End: 2023-01-17 | Stop reason: SDUPTHER

## 2023-01-17 RX ORDER — LIDOCAINE HYDROCHLORIDE 20 MG/ML
INJECTION, SOLUTION INFILTRATION; PERINEURAL PRN
Status: DISCONTINUED | OUTPATIENT
Start: 2023-01-17 | End: 2023-01-17 | Stop reason: SDUPTHER

## 2023-01-17 RX ADMIN — PROPOFOL 50 MG: 10 INJECTION, EMULSION INTRAVENOUS at 08:48

## 2023-01-17 RX ADMIN — LIDOCAINE HYDROCHLORIDE 60 MG: 20 INJECTION, SOLUTION INFILTRATION; PERINEURAL at 08:48

## 2023-01-17 RX ADMIN — PROPOFOL 100 MG: 10 INJECTION, EMULSION INTRAVENOUS at 08:56

## 2023-01-17 RX ADMIN — SODIUM CHLORIDE, POTASSIUM CHLORIDE, SODIUM LACTATE AND CALCIUM CHLORIDE: 600; 310; 30; 20 INJECTION, SOLUTION INTRAVENOUS at 08:36

## 2023-01-17 ASSESSMENT — PAIN - FUNCTIONAL ASSESSMENT: PAIN_FUNCTIONAL_ASSESSMENT: NONE - DENIES PAIN

## 2023-01-17 NOTE — H&P
PATIENT:    Shea Hammer     PHYSICIAN:    Kelly Antonio DO, MD, FACS    YOB: 1960    Primary Care Provider: VITO Brisoce    Reason for Consultation: Colonoscopy    Chief complaint: Constipation and diarrhea      Subjective . History of present illness:  I saw the patient as a consultation for evaluation and treatment of intermittent constipation and diarrhea. Patient had a colonoscopy 5 years ago but does not remember what was found at that time. Negative family history of colon cancer. Review of Systems:  Constitutional:  Negative for chills, fever, and unexpected weight change. HENT: Negative for trouble swallowing and voice change. Eyes:  Negative for visual disturbance. Respiratory:  Negative for apnea, cough, chest tightness, shortness of breath, and wheezing. Cardiovascular:  Negative for chest pain, palpitations, and leg swelling. Gastrointestinal:  Negative for abdominal distention, abdominal pain, anal bleeding, blood in stool, constipation, diarrhea, nausea, rectal pain, and vomiting. Musculoskeletal:  Negative for back pain, gait problem, and joint swelling. Skin:  Negative for color change, rash, and wound  Neurological:  Negative for dizziness, syncope, speech difficulty, weakness, numbness, and headaches. Hematological:  Negative for adenopathy. Does not bruise/bleed easily. Psychiatric/Behavioral:  Negative for confusion.   The patient is not nervous/anxious      History:  Past Medical History:   Diagnosis Date    Chronic back pain     DDD (degenerative disc disease)     DVT (deep venous thrombosis) ([address]     Dyspepsia     Hyperlipidemia     Hypertension 2017    Osteoarthritis        Past Surgical History:   Procedure Laterality Date    ANKLE SURGERY  12 years ago    CHOLECYSTECTOMY      HERNIA REPAIR  2013    HERNIA REPAIR  2013    incisional hernia repair    SHOULDER SURGERY  2008    SPLENECTOMY  2008       Family History   Problem Relation Age of Onset Diabetes Mother     Heart Disease Mother     High Blood Pressure Mother     Arthritis Mother     High Blood Pressure Father     Heart Disease Father        Social History     Tobacco Use    Smoking status: Former     Packs/day: 1.00     Years: 42.00     Pack years: 42.00     Types: Cigarettes     Start date: 67     Quit date:      Years since quittin.0    Smokeless tobacco: Never   Substance Use Topics    Alcohol use: No    Drug use: No       Medications: No current facility-administered medications for this encounter. Allergies: No Known Allergies    Objective     Vital Signs: There were no vitals filed for this visit. Physical Exam:   General Appearance:    Alert, cooperative, in no acute distress   Head:    Normocephalic, without obvious abnormality, atraumatic   Eyes:            Lids and lashes normal, conjunctivae and sclerae normal, no   icterus, no pallor, corneas clear, PERRL   Ears:    Ears appear intact with no abnormalities noted   Throat:   No oral lesions, no thrush, oral mucosa moist   Neck:   No adenopathy, supple, trachea midline, no thyromegaly,  no JVD   Lungs:     Clear to auscultation,respirations regular, even and                  unlabored    Heart:    Regular rhythm and normal rate, normal S1 and S2, no            murmur   Abdomen:     Soft, protuberant, multiple abdominal surgical scars   Extremities:   Moves all extremities well, no edema, no cyanosis, no             redness   Pulses:   Pulses palpable and equal bilaterally   Skin:   No bleeding, bruising or rash   Lymph nodes:   No palpable adenopathy   Neurologic:   Cranial nerves 2 - 12 grossly intact, sensation intact  Psychiatric: No evidence of depression or anxiety   Results Review:   I reviewed the patient's new clinical results. Assessment and Plan:    Encounter for screening colonoscopy  Change in bowel habits    I recommend a colonoscopy for further evaluation.  The procedure was explained as well as the risks which include but are not limited to bleeding, infection, perforation, abdominal pain etc. The patient understands these risks and the procedure and wishes to proceed.       Electronically signed by Khushbu Alejandre DO, FACS  01/17/23  8:26 AM

## 2023-01-17 NOTE — PROGRESS NOTES
Pt to room via stretcher, NAD, No c/o noted. HOB elevated 30 degrees, Left side lying position. BS active in all 4 quads. Lung sounds CTA and no distress noted. Rails up x2, stretcher locked. Assessment completed. Awakens easily. Abd soft /NT/ND:+flatus, +BS, Denies N/V/abd pain. Report received from [facility].

## 2023-01-17 NOTE — PROGRESS NOTES
Pt tolerating PO well without N/V. IV DC'ed with tip intact and pressure applied. No complications at site. DC instructions given prior to procedure and after procedure with follow up appointment given. Pt verb understanding and denies questions. Condition stable. Belongings with pt. Pt left endoscopy with wife. Pt ambulatory. DC instructions in hand.

## 2023-01-17 NOTE — ANESTHESIA PRE PROCEDURE
Department of Anesthesiology  Preprocedure Note       Name:  Jonathan Vides   Age:  63 y.o.  :  1960                                          MRN:  [id number]         Date:  2023      Surgeon: Anna Bey): Russell Rosario DO    Procedure: Procedure(s):  COLORECTAL CANCER SCREENING, NOT HIGH RISK    Medications prior to admission:   Prior to Admission medications    Medication Sig Start Date End Date Taking? Authorizing Provider   buPROPion (WELLBUTRIN XL) 150 MG extended release tablet TAKE ONE TABLET BY MOUTH EVERY MORNING 22   VITO Carbajal   Liraglutide (VICTOZA) 18 MG/3ML SOPN SC injection Inject 0.6 mg into the skin daily for 7 days, THEN 1.2 mg daily for 7 days, THEN 1.8 mg daily for 14 days.  22  VITO Carbajal   metoprolol succinate (TOPROL XL) 50 MG extended release tablet Take 1 tablet by mouth daily 3/14/22   VITO Carbajal   atorvastatin (LIPITOR) 20 MG tablet Take 1 tablet by mouth daily 3/14/22   VITO Carbajal   Cholecalciferol (VITAMIN D3) 1.25 MG (61714 UT) CAPS TAKE 1 CAPSULE BY MOUTH EVERY WEEK 3/14/22   VITO Carbajal   escitalopram (LEXAPRO) 20 MG tablet Take 1 tablet by mouth daily 3/14/22   VITO Carbajal   bumetanide (BUMEX) 2 MG tablet TAKE ONE TABLET BY MOUTH EVERY MORNING 3/14/22   VITO Carbajal   rOPINIRole (REQUIP) 2 MG tablet TAKE 1 TABLET BY MOUTH AT BEDTIME FOR RESTLESS LEGS 3/14/22   VITO Carbajal   aspirin (ASPIRIN ADULT LOW STRENGTH) 81 MG EC tablet TAKE 1 TABLET BY MOUTH DAILY 3/14/22   VITO Carbajal   pantoprazole (PROTONIX) 40 MG tablet TAKE ONE TABLET BY MOUTH DAILY FOR STOMACH 3/14/22   VITO Carbajal   rosuvastatin (CRESTOR) 20 MG tablet Take 1 tablet by mouth nightly 3/14/22   VITO Carbajal   fluticasone (FLONASE) 50 MCG/ACT nasal spray 2 sprays by Nasal route nightly as needed for Rhinitis or Allergies 3/14/22   VITO Carbajal   lactulose (CHRONULAC) 10 GM/15ML solution Take 15 mLs by mouth nightly 3/14/22   VITO Robison   sildenafil (VIAGRA) 100 MG tablet Take 1 tablet by mouth as needed for Erectile Dysfunction 3/14/22   VITO Robison   metOLazone (ZAROXOLYN) 2.5 MG tablet Take 1 tablet by mouth every other day 8/13/21   Historical Provider, MD   brimonidine (ALPHAGAN) 0.2 % ophthalmic solution  3/2/21   Historical Provider, MD   latanoprost (XALATAN) 0.005 % ophthalmic solution  3/2/21   Historical Provider, MD   vitamin D (ERGOCALCIFEROL) 1.25 MG (95236 UT) CAPS capsule take 1 capsule by mouth every week 3/10/20 4/6/21  VITO Robison       Current medications:    No current facility-administered medications for this encounter.        Allergies:  No Known Allergies    Problem List:    Patient Active Problem List   Diagnosis Code    DVT (deep venous thrombosis) ([facility]) I82.409    Generalized OA M15.9    DDD (degenerative disc disease) CPS2545    Dyspepsia R10.13    Incisional hernia K43.2    Peripheral neuropathy G62.9    Hyperkalemia E87.5    Hyperlipidemia E78.5    Polyarthralgia M25.50    Edema R60.9    Vitamin D deficiency E55.9    B12 deficiency E53.8    Internal hemorrhoid K64.8    Diverticular disease of large intestine without perforation or abscess K57.30    SANTINO (generalized anxiety disorder) F41.1       Past Medical History:        Diagnosis Date    Chronic back pain     DDD (degenerative disc disease)     [facility]) [facility] [address]     Dyspepsia     Hyperlipidemia     Hypertension 2017    Osteoarthritis        Past Surgical History:        Procedure Laterality Date    ANKLE SURGERY  12 years ago   [address] [place] [address]  2013    HERNIA REPAIR  2013    incisional hernia repair    SHOULDER SURGERY  2008    SPLENECTOMY  2008       Social History:    Social History     Tobacco Use    Smoking status: Former     Packs/day: 1.00     Years: 42.00     Pack years: 42.00     Types: Cigarettes     Start date: 1975     Quit date: 2012 Years since quittin.0    Smokeless tobacco: Never   Substance Use Topics    Alcohol use: No                                Counseling given: Not Answered      Vital Signs (Current): There were no vitals filed for this visit. BP Readings from Last 3 Encounters:   22 128/72   22 136/78   22 130/78       NPO Status:                                                                                 BMI:   Wt Readings from Last 3 Encounters:   22 253 lb 3.2 oz (114.9 kg)   22 265 lb 12.8 oz (120.6 kg)   22 270 lb 12.8 oz (122.8 kg)     There is no height or weight on file to calculate BMI.    CBC:   Lab Results   Component Value Date/Time    WBC 7.7 2021 12:35 PM    RBC 4.61 2021 12:35 PM    HGB 14.6 2021 12:35 PM    HCT 45.4 2021 12:35 PM    MCV 98.5 2021 12:35 PM    RDW 16.0 2021 12:35 PM     2021 12:35 PM       CMP:   Lab Results   Component Value Date/Time     2022 09:56 AM    K 4.0 2022 09:56 AM    CL 97 2022 09:56 AM    CO2 31 2022 09:56 AM    BUN 13 2022 09:56 AM    CREATININE 1.2 2022 09:56 AM    GFRAA >59 2022 09:56 AM    AGRATIO 1.4 2022 09:56 AM    LABGLOM >59 2022 09:56 AM    GLUCOSE 119 2022 09:56 AM    PROT 7.1 2022 09:56 AM    CALCIUM 10.2 2022 09:56 AM    BILITOT 0.3 2022 09:56 AM    ALKPHOS 128 2022 09:56 AM    AST 23 2022 09:56 AM    ALT 25 2022 09:56 AM       POC Tests: No results for input(s): POCGLU, POCNA, POCK, POCCL, POCBUN, POCHEMO, POCHCT in the last 72 hours.     Coags:   Lab Results   Component Value Date/Time    PROTIME 31.4 2016 08:30 AM    INR 2.90 2016 08:30 AM       HCG (If Applicable): No results found for: PREGTESTUR, PREGSERUM, HCG, HCGQUANT     ABGs: No results found for: PHART, PO2ART, SQP0NRO, CRT2DRT, BEART, P3DLBYLR     Type & Screen (If Applicable):  No results found for: LABABO, LABRH    Drug/Infectious Status (If Applicable):  No results found for: HIV, HEPCAB    COVID-19 Screening (If Applicable): No results found for: COVID19        Anesthesia Evaluation  Patient summary reviewed and Nursing notes reviewed  Airway: Mallampati: II  TM distance: >3 FB   Neck ROM: full  Mouth opening: > = 3 FB   Dental: normal exam         Pulmonary: breath sounds clear to auscultation            Patient did not smoke on day of surgery. Cardiovascular:  Exercise tolerance: good (>4 METS),   (+) hypertension: moderate, hyperlipidemia        Rhythm: regular  Rate: normal                    Neuro/Psych:   (+) neuromuscular disease:, psychiatric history: stable with treatmentdepression/anxiety             GI/Hepatic/Renal:   (+) bowel prep,           Endo/Other:    (+) : arthritis: OA., .                 Abdominal:   (+) obese,           Vascular:   + PE. Other Findings:           Anesthesia Plan      MAC     ASA 3             Anesthetic plan and risks discussed with patient. Plan discussed with VELASQUEZ Tai   1/17/2023

## 2023-01-17 NOTE — PROGRESS NOTES
Pt arrived ambulatory to endoscopy. wife to drive home. Consent verified without questions. LR at 100/hr per gravity. Prep completed and effective per pt. DC instructions completed with pt prior to sedation. No questions at this time. Denies need to void. Will continue to monitor closely.

## 2023-01-17 NOTE — OP NOTE
PATIENT:    Queen Mark    DATE OF SURGERY:  01/17/23    PHYSICIAN:    Leda Roberts DO, MD, FACS    REFERRING PHYSICIAN:  VITO Davis      YOB: 1960    PREOPERATIVE DIAGNOSIS:   Screening colonoscopy    POSTOPERATIVE DIAGNOSIS:  Same      Estimated blood loss: None    PROCEDURE:  Colonoscopy     HISTORY:   The patient was sent to me as a consultation via VITO Davis for evaluation and treatment of the above-mentioned symptomatology. The patient is here now today for elective colonoscopy. I did meet with the patient preoperatively who understands the full risks and benefits of the above-mentioned procedure. We will proceed with this today on an elective basis. ANESTHESIA:  The patient was monitored both preoperatively and postoperatively in the normal fashion from a cardiovascular standpoint. Oxygen was delivered at 2 liters per nasal cannula, and oxygen saturations were monitored during the procedure. The patient remained stable from a cardiovascular standpoint throughout the entire procedure. OPERATIVE PROCEDURE:  The patient was taken to the endoscopy unit and placed in the supine position and given anesthesia as mentioned above. The patient was then placed in the left lateral decubitus position and the scope was introduced into the patients anus and then into the rectum without difficulty. The scope was carefully advanced throughout the colon to the ileocecal valve. All mucosal surfaces were visualized. Upon careful withdrawal of the scope, there was noted to be completely normal mucosa with no evidence of polyps or masses. A retroflexed view was obtained of the patients rectum and this showed no hemorrhoids. Digital rectal examination was performed and revealed good sphincter tone and no obvious masses. The patient was stable at this point in time and subsequently transferred back to the recovery room in stable condition.     QUALITY OF PREP: Good    PLAN:  Repeat colonoscopy in 10 years    Electronically signed by Sarah Tomas DO, FACS on 1/17/2023 at 9:13 AM

## 2023-01-17 NOTE — ANESTHESIA POSTPROCEDURE EVALUATION
Department of Anesthesiology  Postprocedure Note    Patient: Yovana Hyman  MRN: [id number]  [place]: 1960  Date of evaluation: 1/17/2023      Procedure Summary     Date: 01/17/23 Room / Location: [address] [place] [address] 01 / [address] and [address]    Anesthesia Start: 2446 Anesthesia Stop: 5995    Procedure: COLORECTAL CANCER SCREENING, NOT HIGH RISK Diagnosis:       Screening for colon cancer      (Screening for colon cancer [Z12.11])    Surgeons: Khushbu Alejandre DO Responsible Provider: Zoila Sandoval    Anesthesia Type: MAC ASA Status: 3          Anesthesia Type: MAC    Sudeep Phase I: Sudeep Score: 10    Sudeep Phase II:        Anesthesia Post Evaluation    Patient location during evaluation: bedside  Patient participation: waiting for patient participation  Level of consciousness: responsive to light touch  Pain score: 0  Airway patency: patent  Nausea & Vomiting: no vomiting and no nausea  Complications: no  Cardiovascular status: blood pressure returned to baseline  Respiratory status: acceptable  Hydration status: euvolemic

## 2023-01-25 ENCOUNTER — OFFICE VISIT (OUTPATIENT)
Dept: FAMILY MEDICINE CLINIC | Age: 63
End: 2023-01-25
Payer: MEDICAID

## 2023-01-25 ENCOUNTER — HOSPITAL ENCOUNTER (OUTPATIENT)
Facility: HOSPITAL | Age: 63
Discharge: HOME | End: 2023-01-25
Payer: MEDICAID

## 2023-01-25 VITALS
WEIGHT: 264 LBS | RESPIRATION RATE: 18 BRPM | TEMPERATURE: 97.3 F | BODY MASS INDEX: 39.1 KG/M2 | DIASTOLIC BLOOD PRESSURE: 78 MMHG | HEIGHT: 69 IN | SYSTOLIC BLOOD PRESSURE: 128 MMHG | HEART RATE: 77 BPM | OXYGEN SATURATION: 96 %

## 2023-01-25 DIAGNOSIS — E11.8 DIABETES MELLITUS TYPE 2 WITH COMPLICATIONS (HCC): ICD-10-CM

## 2023-01-25 DIAGNOSIS — E78.2 MIXED HYPERLIPIDEMIA: ICD-10-CM

## 2023-01-25 DIAGNOSIS — M54.16 LUMBAR BACK PAIN WITH RADICULOPATHY AFFECTING RIGHT LOWER EXTREMITY: ICD-10-CM

## 2023-01-25 DIAGNOSIS — R60.1 ANASARCA: ICD-10-CM

## 2023-01-25 DIAGNOSIS — E11.8 DIABETES MELLITUS TYPE 2 WITH COMPLICATIONS (HCC): Primary | ICD-10-CM

## 2023-01-25 DIAGNOSIS — I10 ESSENTIAL HYPERTENSION: ICD-10-CM

## 2023-01-25 PROCEDURE — 80053 COMPREHEN METABOLIC PANEL: CPT

## 2023-01-25 PROCEDURE — 83036 HEMOGLOBIN GLYCOSYLATED A1C: CPT

## 2023-01-25 PROCEDURE — 3078F DIAST BP <80 MM HG: CPT | Performed by: NURSE PRACTITIONER

## 2023-01-25 PROCEDURE — 3074F SYST BP LT 130 MM HG: CPT | Performed by: NURSE PRACTITIONER

## 2023-01-25 PROCEDURE — G8417 CALC BMI ABV UP PARAM F/U: HCPCS | Performed by: NURSE PRACTITIONER

## 2023-01-25 PROCEDURE — 3017F COLORECTAL CA SCREEN DOC REV: CPT | Performed by: NURSE PRACTITIONER

## 2023-01-25 PROCEDURE — 80061 LIPID PANEL: CPT

## 2023-01-25 PROCEDURE — 1036F TOBACCO NON-USER: CPT | Performed by: NURSE PRACTITIONER

## 2023-01-25 PROCEDURE — G8427 DOCREV CUR MEDS BY ELIG CLIN: HCPCS | Performed by: NURSE PRACTITIONER

## 2023-01-25 PROCEDURE — 2022F DILAT RTA XM EVC RTNOPTHY: CPT | Performed by: NURSE PRACTITIONER

## 2023-01-25 PROCEDURE — 3044F HG A1C LEVEL LT 7.0%: CPT | Performed by: NURSE PRACTITIONER

## 2023-01-25 PROCEDURE — G8482 FLU IMMUNIZE ORDER/ADMIN: HCPCS | Performed by: NURSE PRACTITIONER

## 2023-01-25 PROCEDURE — 36415 COLL VENOUS BLD VENIPUNCTURE: CPT

## 2023-01-25 PROCEDURE — 99214 OFFICE O/P EST MOD 30 MIN: CPT | Performed by: NURSE PRACTITIONER

## 2023-01-25 RX ORDER — ROPINIROLE 2 MG/1
TABLET, FILM COATED ORAL
Qty: 60 TABLET | Refills: 11 | Status: SHIPPED | OUTPATIENT
Start: 2023-01-25

## 2023-01-25 RX ORDER — METOPROLOL SUCCINATE 50 MG/1
50 TABLET, EXTENDED RELEASE ORAL DAILY
Qty: 30 TABLET | Refills: 11 | Status: SHIPPED | OUTPATIENT
Start: 2023-01-25

## 2023-01-25 RX ORDER — ROSUVASTATIN CALCIUM 20 MG/1
20 TABLET, COATED ORAL NIGHTLY
Qty: 30 TABLET | Refills: 11 | Status: SHIPPED | OUTPATIENT
Start: 2023-01-25

## 2023-01-25 RX ORDER — FLUTICASONE PROPIONATE 50 MCG
2 SPRAY, SUSPENSION (ML) NASAL NIGHTLY PRN
Qty: 1 EACH | Refills: 11 | Status: SHIPPED | OUTPATIENT
Start: 2023-01-25

## 2023-01-25 RX ORDER — SILDENAFIL 100 MG/1
100 TABLET, FILM COATED ORAL PRN
Qty: 30 TABLET | Refills: 0 | Status: SHIPPED | OUTPATIENT
Start: 2023-01-25

## 2023-01-25 RX ORDER — ASPIRIN 81 MG/1
TABLET ORAL
Qty: 60 TABLET | Refills: 11 | Status: SHIPPED | OUTPATIENT
Start: 2023-01-25

## 2023-01-25 RX ORDER — CHOLECALCIFEROL (VITAMIN D3) 1250 MCG
CAPSULE ORAL
Qty: 8 CAPSULE | Refills: 11 | Status: SHIPPED | OUTPATIENT
Start: 2023-01-25

## 2023-01-25 RX ORDER — PANTOPRAZOLE SODIUM 40 MG/1
TABLET, DELAYED RELEASE ORAL
Qty: 60 TABLET | Refills: 11 | Status: SHIPPED | OUTPATIENT
Start: 2023-01-25

## 2023-01-25 RX ORDER — LACTULOSE 10 G/15ML
10 SOLUTION ORAL NIGHTLY
Qty: 450 ML | Refills: 11 | Status: SHIPPED | OUTPATIENT
Start: 2023-01-25

## 2023-01-25 RX ORDER — ATORVASTATIN CALCIUM 20 MG/1
20 TABLET, FILM COATED ORAL DAILY
Qty: 30 TABLET | Refills: 11 | Status: SHIPPED | OUTPATIENT
Start: 2023-01-25

## 2023-01-25 RX ORDER — ESCITALOPRAM OXALATE 20 MG/1
20 TABLET ORAL DAILY
Qty: 60 TABLET | Refills: 11 | Status: SHIPPED | OUTPATIENT
Start: 2023-01-25

## 2023-01-25 RX ORDER — BUMETANIDE 2 MG/1
TABLET ORAL
Qty: 60 TABLET | Refills: 11 | Status: SHIPPED | OUTPATIENT
Start: 2023-01-25

## 2023-01-25 RX ORDER — SEMAGLUTIDE 1.34 MG/ML
0.25 INJECTION, SOLUTION SUBCUTANEOUS WEEKLY
Qty: 1 ADJUSTABLE DOSE PRE-FILLED PEN SYRINGE | Refills: 2 | Status: SHIPPED | OUTPATIENT
Start: 2023-01-25

## 2023-01-25 ASSESSMENT — PATIENT HEALTH QUESTIONNAIRE - PHQ9
4. FEELING TIRED OR HAVING LITTLE ENERGY: 0
5. POOR APPETITE OR OVEREATING: 0
2. FEELING DOWN, DEPRESSED OR HOPELESS: 1
SUM OF ALL RESPONSES TO PHQ QUESTIONS 1-9: 1
9. THOUGHTS THAT YOU WOULD BE BETTER OFF DEAD, OR OF HURTING YOURSELF: 0
10. IF YOU CHECKED OFF ANY PROBLEMS, HOW DIFFICULT HAVE THESE PROBLEMS MADE IT FOR YOU TO DO YOUR WORK, TAKE CARE OF THINGS AT HOME, OR GET ALONG WITH OTHER PEOPLE: 0
SUM OF ALL RESPONSES TO PHQ QUESTIONS 1-9: 1
7. TROUBLE CONCENTRATING ON THINGS, SUCH AS READING THE NEWSPAPER OR WATCHING TELEVISION: 0
6. FEELING BAD ABOUT YOURSELF - OR THAT YOU ARE A FAILURE OR HAVE LET YOURSELF OR YOUR FAMILY DOWN: 0
8. MOVING OR SPEAKING SO SLOWLY THAT OTHER PEOPLE COULD HAVE NOTICED. OR THE OPPOSITE, BEING SO FIGETY OR RESTLESS THAT YOU HAVE BEEN MOVING AROUND A LOT MORE THAN USUAL: 0
3. TROUBLE FALLING OR STAYING ASLEEP: 0
SUM OF ALL RESPONSES TO PHQ9 QUESTIONS 1 & 2: 1
1. LITTLE INTEREST OR PLEASURE IN DOING THINGS: 0

## 2023-01-25 ASSESSMENT — ENCOUNTER SYMPTOMS
BACK PAIN: 1
GASTROINTESTINAL NEGATIVE: 1
RESPIRATORY NEGATIVE: 1
EYES NEGATIVE: 1

## 2023-01-25 NOTE — PROGRESS NOTES
Chief Complaint   Patient presents with    Hypertension     Reg f/u        Have you seen any other physician or provider since your last visit yes - Surgeon &GI    Have you had any other diagnostic tests since your last visit?  yes - colonoscopy & Ct    Have you changed or stopped any medications since your last visit? no

## 2023-01-25 NOTE — PROGRESS NOTES
SUBJECTIVE:  Jerel Maradiaga is a 63 y.o. male that presents with   Chief Complaint   Patient presents with    Hypertension     Reg f/u    . HPI:    This is a 63year old white male who presents today for follow up with htn, hyperlipidemia anasarca low back pain and prediabetes. Other he is taking all of his medications and tolerating them well. He continues to have significant lower extremity edema. He is doing well with his blood pressure and does see nephrology for ckd as well. He has chronic low back pain and uses a cane for stability when he is mobile. He denies new problems. Hypertension  This is a chronic problem. The current episode started more than 1 year ago. The problem is unchanged. The problem is controlled. Associated symptoms include peripheral edema. There are no associated agents to hypertension. Risk factors for coronary artery disease include dyslipidemia, family history, male gender, obesity, stress and sedentary lifestyle. Past treatments include ACE inhibitors. The current treatment provides significant improvement. Compliance problems include exercise. Hypertensive end-organ damage includes PVD. Back Pain  This is a chronic (sees pain management) problem. The current episode started more than 1 year ago. The problem occurs constantly. The pain is present in the lumbar spine, sacro-iliac, gluteal and thoracic spine. The quality of the pain is described as aching, burning, cramping and stabbing. The pain radiates to the left foot, left knee and left thigh. The pain is at a severity of 6/10. The pain is moderate. The pain is The same all the time. The symptoms are aggravated by bending, position and twisting. Stiffness is present All day, at night and in the morning. Associated symptoms include leg pain and paresis. Risk factors include lack of exercise and sedentary lifestyle. He has tried NSAIDs, home exercises, heat and analgesics for the symptoms.  The treatment provided mild relief. Hyperlipidemia  This is a chronic problem. The current episode started more than 1 year ago. The problem is controlled. Recent lipid tests were reviewed and are normal. Factors aggravating his hyperlipidemia include fatty foods. Associated symptoms include leg pain and myalgias. Current antihyperlipidemic treatment includes statins and diet change. The current treatment provides moderate improvement of lipids. Compliance problems include adherence to exercise (very limited for exercise). Risk factors for coronary artery disease include dyslipidemia, family history, male sex, obesity, a sedentary lifestyle and stress. Diabetes  He presents for his follow-up diabetic visit. He has type 2 diabetes mellitus. No MedicAlert identification noted. His disease course has been stable. There are no hypoglycemic associated symptoms. There are no diabetic associated symptoms. There are no hypoglycemic complications. Symptoms are stable. Diabetic complications include heart disease, peripheral neuropathy and PVD. Risk factors for coronary artery disease include diabetes mellitus, dyslipidemia, family history, obesity, male sex, hypertension and sedentary lifestyle. Current diabetic treatment includes diet. He is compliant with treatment most of the time. He is following a diabetic and low fat/cholesterol diet. Meal planning includes avoidance of concentrated sweets. He has not had a previous visit with a dietitian. He participates in exercise intermittently. There is no change in his home blood glucose trend. An ACE inhibitor/angiotensin II receptor blocker is being taken. Review of Systems   Constitutional: Negative. HENT: Negative. Eyes: Negative. Respiratory: Negative. Cardiovascular:  Positive for leg swelling. Gastrointestinal: Negative. Endocrine: Negative. Genitourinary: Negative. Musculoskeletal:  Positive for arthralgias, back pain and myalgias. Skin: Negative.     Neurological: Negative. Hematological: Negative. Psychiatric/Behavioral: Negative. All other systems reviewed and are negative. OBJECTIVE:  /78   Pulse 77   Temp 97.3 °F (36.3 °C) (Infrared)   Resp 18   Ht 5' 9\" (1.753 m)   Wt 264 lb (119.7 kg)   SpO2 96% Comment: ra  BMI 38.99 kg/m²   Physical Exam  Vitals and nursing note reviewed. Constitutional:       Appearance: Normal appearance. He is obese. HENT:      Head: Normocephalic and atraumatic. Eyes:      Conjunctiva/sclera: Conjunctivae normal.   Cardiovascular:      Rate and Rhythm: Normal rate and regular rhythm. Pulses: Normal pulses. Heart sounds: Normal heart sounds. Pulmonary:      Effort: Pulmonary effort is normal.      Breath sounds: Normal breath sounds. Musculoskeletal:      Cervical back: Normal range of motion and neck supple. Lumbar back: Tenderness present. Decreased range of motion. Right lower leg: Edema present. Left lower leg: Edema present. Skin:     General: Skin is warm and dry. Capillary Refill: Capillary refill takes less than 2 seconds. Neurological:      Mental Status: He is alert and oriented to person, place, and time. Psychiatric:         Mood and Affect: Mood normal.         Behavior: Behavior normal.         Thought Content:  Thought content normal.         Judgment: Judgment normal.     Results in Past 30 Days  Result Component Current Result Ref Range Previous Result Ref Range   Albumin/Globulin Ratio 1.3 (1/25/2023) 0.8 - 2.0 Not in Time Range    Albumin 4.0 (1/25/2023) 3.4 - 4.8 g/dL Not in Time Range    Alkaline Phosphatase 144 (H) (1/25/2023) 25 - 100 U/L Not in Time Range    ALT 42 (H) (1/25/2023) 4 - 36 U/L Not in Time Range    AST 36 (H) (1/25/2023) 8 - 33 U/L Not in Time Range    BUN 11 (1/25/2023) 6 - 20 mg/dL Not in Time Range    Calcium 9.2 (1/25/2023) 8.5 - 10.5 mg/dL Not in Time Range    Chloride 100 (1/25/2023) 98 - 107 mmol/L Not in Time Range    CO2 29 (1/25/2023) 20 - 30 mmol/L Not in Time Range    Creatinine 0.9 (1/25/2023) 0.4 - 1.2 mg/dL Not in Time Range    Est, Glom Filt Rate >60 (1/25/2023) >59 Not in Time Range    Globulin 3.0 (1/25/2023) Not Established g/dL Not in Time Range    Glucose 113 (H) (1/25/2023) 74 - 106 mg/dL Not in Time Range    Potassium 4.7 (1/25/2023) 3.4 - 5.1 mmol/L Not in Time Range    Sodium 138 (1/25/2023) 136 - 145 mmol/L Not in Time Range    Total Bilirubin 0.5 (1/25/2023) 0.3 - 1.2 mg/dL Not in Time Range    Total Protein 7.0 (1/25/2023) 6.4 - 8.3 g/dL Not in Time Range        Hemoglobin A1C (%)   Date Value   01/25/2023 6.0     Microscopic Examination (no units)   Date Value   12/01/2021 Not Indicated     LDL Calculated (mg/dL)   Date Value   01/25/2023 82         Lab Results   Component Value Date/Time    WBC 7.7 12/01/2021 12:35 PM    NEUTROABS 2.8 12/05/2017 11:00 AM    HGB 14.6 12/01/2021 12:35 PM    HCT 45.4 12/01/2021 12:35 PM    MCV 98.5 12/01/2021 12:35 PM     12/01/2021 12:35 PM       Lab Results   Component Value Date    TSH 1.52 03/10/2020         ASSESSMENT/PLAN:   Diagnosis Orders   1. Diabetes mellitus type 2 with complications (HCC)  Hemoglobin A1C    Semaglutide,0.25 or 0.5MG/DOS, (OZEMPIC, 0.25 OR 0.5 MG/DOSE,) 2 MG/1.5ML SOPN      2. Essential hypertension        3. Mixed hyperlipidemia  Lipid Panel    Comprehensive Metabolic Panel      4. Lumbar back pain with radiculopathy affecting right lower extremity        5.  Anasarca                Orders Placed This Encounter   Procedures    Hemoglobin A1C     Standing Status:   Future     Number of Occurrences:   1     Standing Expiration Date:   1/25/2024    Lipid Panel     Standing Status:   Future     Number of Occurrences:   1     Standing Expiration Date:   1/25/2024     Order Specific Question:   Is Patient Fasting?/# of Hours     Answer:   6    Comprehensive Metabolic Panel     Standing Status:   Future     Number of Occurrences:   1     Standing Expiration Date:   1/25/2024        Outpatient Encounter Medications as of 1/25/2023   Medication Sig Dispense Refill    Semaglutide,0.25 or 0.5MG/DOS, (OZEMPIC, 0.25 OR 0.5 MG/DOSE,) 2 MG/1.5ML SOPN Inject 0.25 mg into the skin once a week 1 Adjustable Dose Pre-filled Pen Syringe 2    sildenafil (VIAGRA) 100 MG tablet Take 1 tablet by mouth as needed for Erectile Dysfunction 30 tablet 0    lactulose (CHRONULAC) 10 GM/15ML solution Take 15 mLs by mouth nightly 450 mL 11    fluticasone (FLONASE) 50 MCG/ACT nasal spray 2 sprays by Nasal route nightly as needed for Rhinitis or Allergies 1 each 11    rosuvastatin (CRESTOR) 20 MG tablet Take 1 tablet by mouth nightly 30 tablet 11    pantoprazole (PROTONIX) 40 MG tablet TAKE ONE TABLET BY MOUTH DAILY FOR STOMACH 60 tablet 11    aspirin (ASPIRIN ADULT LOW STRENGTH) 81 MG EC tablet TAKE 1 TABLET BY MOUTH DAILY 60 tablet 11    rOPINIRole (REQUIP) 2 MG tablet TAKE 1 TABLET BY MOUTH AT BEDTIME FOR RESTLESS LEGS 60 tablet 11    bumetanide (BUMEX) 2 MG tablet TAKE ONE TABLET BY MOUTH EVERY MORNING 60 tablet 11    escitalopram (LEXAPRO) 20 MG tablet Take 1 tablet by mouth daily 60 tablet 11    Cholecalciferol (VITAMIN D3) 1.25 MG (95966 UT) CAPS TAKE 1 CAPSULE BY MOUTH EVERY WEEK 8 capsule 11    atorvastatin (LIPITOR) 20 MG tablet Take 1 tablet by mouth daily 30 tablet 11    metoprolol succinate (TOPROL XL) 50 MG extended release tablet Take 1 tablet by mouth daily 30 tablet 11    buPROPion (WELLBUTRIN XL) 150 MG extended release tablet TAKE ONE TABLET BY MOUTH EVERY MORNING 30 tablet 5    metOLazone (ZAROXOLYN) 2.5 MG tablet Take 1 tablet by mouth every other day      brimonidine (ALPHAGAN) 0.2 % ophthalmic solution       latanoprost (XALATAN) 0.005 % ophthalmic solution       Liraglutide (VICTOZA) 18 MG/3ML SOPN SC injection Inject 0.6 mg into the skin daily for 7 days, THEN 1.2 mg daily for 7 days, THEN 1.8 mg daily for 14 days.  2 pen 0    [DISCONTINUED] metoprolol succinate (TOPROL XL) 50 MG extended release tablet Take 1 tablet by mouth daily 30 tablet 11    [DISCONTINUED] atorvastatin (LIPITOR) 20 MG tablet Take 1 tablet by mouth daily 30 tablet 11    [DISCONTINUED] Cholecalciferol (VITAMIN D3) 1.25 MG (74892 UT) CAPS TAKE 1 CAPSULE BY MOUTH EVERY WEEK 8 capsule 11    [DISCONTINUED] escitalopram (LEXAPRO) 20 MG tablet Take 1 tablet by mouth daily 60 tablet 11    [DISCONTINUED] bumetanide (BUMEX) 2 MG tablet TAKE ONE TABLET BY MOUTH EVERY MORNING 60 tablet 11    [DISCONTINUED] rOPINIRole (REQUIP) 2 MG tablet TAKE 1 TABLET BY MOUTH AT BEDTIME FOR RESTLESS LEGS 60 tablet 11    [DISCONTINUED] aspirin (ASPIRIN ADULT LOW STRENGTH) 81 MG EC tablet TAKE 1 TABLET BY MOUTH DAILY 60 tablet 11    [DISCONTINUED] pantoprazole (PROTONIX) 40 MG tablet TAKE ONE TABLET BY MOUTH DAILY FOR STOMACH 60 tablet 11    [DISCONTINUED] rosuvastatin (CRESTOR) 20 MG tablet Take 1 tablet by mouth nightly 30 tablet 11    [DISCONTINUED] fluticasone (FLONASE) 50 MCG/ACT nasal spray 2 sprays by Nasal route nightly as needed for Rhinitis or Allergies 1 each 11    [DISCONTINUED] lactulose (CHRONULAC) 10 GM/15ML solution Take 15 mLs by mouth nightly 450 mL 11    [DISCONTINUED] sildenafil (VIAGRA) 100 MG tablet Take 1 tablet by mouth as needed for Erectile Dysfunction 30 tablet 0    [DISCONTINUED] vitamin D (ERGOCALCIFEROL) 1.25 MG (06256 UT) CAPS capsule take 1 capsule by mouth every week 4 capsule 11     No facility-administered encounter medications on file as of 1/25/2023. Return in about 3 months (around 4/25/2023), or if symptoms worsen or fail to improve. PATIENT COUNSELING    Counseling was provided today regardingthe following topics: Healthy eating habits, Regular exercise, substance abuse and healthy sleep habits. Discussed use, benefit, and side effectsof prescribed medications. Barriers to medication compliance addressed. All patient questions answered. Pt voiced understanding.

## 2023-01-26 ENCOUNTER — TELEPHONE (OUTPATIENT)
Dept: FAMILY MEDICINE CLINIC | Age: 63
End: 2023-01-26

## 2023-01-26 LAB
A/G RATIO: 1.3 (ref 0.8–2)
ALBUMIN SERPL-MCNC: 4 G/DL (ref 3.4–4.8)
ALP BLD-CCNC: 144 U/L (ref 25–100)
ALT SERPL-CCNC: 42 U/L (ref 4–36)
ANION GAP SERPL CALCULATED.3IONS-SCNC: 9 MMOL/L (ref 3–16)
AST SERPL-CCNC: 36 U/L (ref 8–33)
BILIRUB SERPL-MCNC: 0.5 MG/DL (ref 0.3–1.2)
BUN BLDV-MCNC: 11 MG/DL (ref 6–20)
CALCIUM SERPL-MCNC: 9.2 MG/DL (ref 8.5–10.5)
CHLORIDE BLD-SCNC: 100 MMOL/L (ref 98–107)
CHOLESTEROL, TOTAL: 136 MG/DL (ref 0–200)
CO2: 29 MMOL/L (ref 20–30)
CREAT SERPL-MCNC: 0.9 MG/DL (ref 0.4–1.2)
GFR SERPL CREATININE-BSD FRML MDRD: >60 ML/MIN/{1.73_M2}
GLOBULIN: 3 G/DL
GLUCOSE BLD-MCNC: 113 MG/DL (ref 74–106)
HBA1C MFR BLD: 6 %
HDLC SERPL-MCNC: 35 MG/DL (ref 40–60)
LDL CHOLESTEROL CALCULATED: 82 MG/DL
POTASSIUM SERPL-SCNC: 4.7 MMOL/L (ref 3.4–5.1)
SODIUM BLD-SCNC: 138 MMOL/L (ref 136–145)
TOTAL PROTEIN: 7 G/DL (ref 6.4–8.3)
TRIGL SERPL-MCNC: 97 MG/DL (ref 0–249)
VLDLC SERPL CALC-MCNC: 19 MG/DL

## 2023-01-31 ENCOUNTER — TELEPHONE (OUTPATIENT)
Dept: FAMILY MEDICINE CLINIC | Age: 63
End: 2023-01-31

## 2023-03-27 RX ORDER — ERGOCALCIFEROL 1.25 MG/1
CAPSULE ORAL
Qty: 4 CAPSULE | Refills: 11 | Status: SHIPPED | OUTPATIENT
Start: 2023-03-27

## 2023-03-27 RX ORDER — ASPIRIN 81 MG/1
TABLET ORAL
Qty: 30 TABLET | Refills: 11 | OUTPATIENT
Start: 2023-03-27

## 2023-03-27 RX ORDER — ESCITALOPRAM OXALATE 20 MG/1
20 TABLET ORAL DAILY
Qty: 30 TABLET | Refills: 11 | Status: SHIPPED | OUTPATIENT
Start: 2023-03-27

## 2023-03-27 RX ORDER — ROPINIROLE 2 MG/1
TABLET, FILM COATED ORAL
Qty: 30 TABLET | Refills: 11 | Status: SHIPPED | OUTPATIENT
Start: 2023-03-27

## 2023-03-27 RX ORDER — ASPIRIN 81 MG/1
TABLET ORAL
Qty: 30 TABLET | Refills: 11 | Status: SHIPPED | OUTPATIENT
Start: 2023-03-27

## 2023-03-27 RX ORDER — ERGOCALCIFEROL 1.25 MG/1
CAPSULE ORAL
Qty: 4 CAPSULE | Refills: 11 | OUTPATIENT
Start: 2023-03-27

## 2023-03-27 RX ORDER — FLUTICASONE PROPIONATE 50 MCG
SPRAY, SUSPENSION (ML) NASAL
Qty: 16 G | Refills: 11 | Status: SHIPPED | OUTPATIENT
Start: 2023-03-27

## 2023-03-27 RX ORDER — PANTOPRAZOLE SODIUM 40 MG/1
TABLET, DELAYED RELEASE ORAL
Qty: 30 TABLET | Refills: 11 | Status: SHIPPED | OUTPATIENT
Start: 2023-03-27

## 2023-03-28 ENCOUNTER — TELEPHONE (OUTPATIENT)
Dept: FAMILY MEDICINE CLINIC | Age: 63
End: 2023-03-28

## 2023-03-29 DIAGNOSIS — E11.8 DIABETES MELLITUS TYPE 2 WITH COMPLICATIONS (HCC): ICD-10-CM

## 2023-03-29 RX ORDER — SEMAGLUTIDE 1.34 MG/ML
0.25 INJECTION, SOLUTION SUBCUTANEOUS WEEKLY
Qty: 3 ML | Refills: 2 | Status: SHIPPED | OUTPATIENT
Start: 2023-03-29

## 2023-04-25 ENCOUNTER — HOSPITAL ENCOUNTER (OUTPATIENT)
Facility: HOSPITAL | Age: 63
Discharge: HOME | End: 2023-04-25
Payer: MEDICAID

## 2023-04-25 ENCOUNTER — OFFICE VISIT (OUTPATIENT)
Dept: FAMILY MEDICINE CLINIC | Age: 63
End: 2023-04-25
Payer: MEDICAID

## 2023-04-25 VITALS
HEART RATE: 82 BPM | HEIGHT: 69 IN | SYSTOLIC BLOOD PRESSURE: 150 MMHG | BODY MASS INDEX: 38.24 KG/M2 | RESPIRATION RATE: 18 BRPM | WEIGHT: 258.2 LBS | TEMPERATURE: 98.2 F | DIASTOLIC BLOOD PRESSURE: 90 MMHG | OXYGEN SATURATION: 96 %

## 2023-04-25 DIAGNOSIS — I10 UNCONTROLLED HYPERTENSION: ICD-10-CM

## 2023-04-25 DIAGNOSIS — E11.8 DIABETES MELLITUS TYPE 2 WITH COMPLICATIONS (HCC): ICD-10-CM

## 2023-04-25 DIAGNOSIS — E11.8 DIABETES MELLITUS TYPE 2 WITH COMPLICATIONS (HCC): Primary | ICD-10-CM

## 2023-04-25 DIAGNOSIS — N28.9 RENAL INSUFFICIENCY: ICD-10-CM

## 2023-04-25 DIAGNOSIS — E78.2 MIXED HYPERLIPIDEMIA: ICD-10-CM

## 2023-04-25 DIAGNOSIS — M54.16 LUMBAR BACK PAIN WITH RADICULOPATHY AFFECTING RIGHT LOWER EXTREMITY: ICD-10-CM

## 2023-04-25 LAB
ALBUMIN SERPL-MCNC: 4.1 G/DL (ref 3.4–4.8)
ALBUMIN/GLOB SERPL: 1.5 {RATIO} (ref 0.8–2)
ALP SERPL-CCNC: 142 U/L (ref 25–100)
ALT SERPL-CCNC: 25 U/L (ref 4–36)
ANION GAP SERPL CALCULATED.3IONS-SCNC: 10 MMOL/L (ref 3–16)
AST SERPL-CCNC: 16 U/L (ref 8–33)
BILIRUB SERPL-MCNC: 0.4 MG/DL (ref 0.3–1.2)
BUN SERPL-MCNC: 12 MG/DL (ref 6–20)
CALCIUM SERPL-MCNC: 9.9 MG/DL (ref 8.5–10.5)
CHLORIDE SERPL-SCNC: 101 MMOL/L (ref 98–107)
CHOLEST SERPL-MCNC: 176 MG/DL (ref 0–200)
CO2 SERPL-SCNC: 30 MMOL/L (ref 20–30)
CREAT SERPL-MCNC: 0.9 MG/DL (ref 0.4–1.2)
CREAT UR-MCNC: 123 MG/DL (ref 1.5–300)
GFR SERPLBLD CREATININE-BSD FMLA CKD-EPI: >60 ML/MIN/{1.73_M2}
GLOBULIN SER CALC-MCNC: 2.8 G/DL
GLUCOSE SERPL-MCNC: 104 MG/DL (ref 74–106)
HBA1C MFR BLD: 6 %
HDLC SERPL-MCNC: 43 MG/DL (ref 40–60)
LDLC SERPL CALC-MCNC: 92 MG/DL
MICROALBUMIN UR DL<=1MG/L-MCNC: <1.2 MG/DL (ref 0–22)
MICROALBUMIN/CREAT UR: NORMAL MG/G (ref 0–30)
POTASSIUM SERPL-SCNC: 4.8 MMOL/L (ref 3.4–5.1)
PROT SERPL-MCNC: 6.9 G/DL (ref 6.4–8.3)
SODIUM SERPL-SCNC: 141 MMOL/L (ref 136–145)
TRIGL SERPL-MCNC: 205 MG/DL (ref 0–249)
VLDLC SERPL CALC-MCNC: 41 MG/DL

## 2023-04-25 PROCEDURE — 1036F TOBACCO NON-USER: CPT | Performed by: NURSE PRACTITIONER

## 2023-04-25 PROCEDURE — 3078F DIAST BP <80 MM HG: CPT | Performed by: NURSE PRACTITIONER

## 2023-04-25 PROCEDURE — 83036 HEMOGLOBIN GLYCOSYLATED A1C: CPT

## 2023-04-25 PROCEDURE — 82043 UR ALBUMIN QUANTITATIVE: CPT

## 2023-04-25 PROCEDURE — G8417 CALC BMI ABV UP PARAM F/U: HCPCS | Performed by: NURSE PRACTITIONER

## 2023-04-25 PROCEDURE — 3044F HG A1C LEVEL LT 7.0%: CPT | Performed by: NURSE PRACTITIONER

## 2023-04-25 PROCEDURE — G8427 DOCREV CUR MEDS BY ELIG CLIN: HCPCS | Performed by: NURSE PRACTITIONER

## 2023-04-25 PROCEDURE — 3017F COLORECTAL CA SCREEN DOC REV: CPT | Performed by: NURSE PRACTITIONER

## 2023-04-25 PROCEDURE — 2022F DILAT RTA XM EVC RTNOPTHY: CPT | Performed by: NURSE PRACTITIONER

## 2023-04-25 PROCEDURE — 3074F SYST BP LT 130 MM HG: CPT | Performed by: NURSE PRACTITIONER

## 2023-04-25 PROCEDURE — 82570 ASSAY OF URINE CREATININE: CPT

## 2023-04-25 PROCEDURE — 80061 LIPID PANEL: CPT

## 2023-04-25 PROCEDURE — 80053 COMPREHEN METABOLIC PANEL: CPT

## 2023-04-25 PROCEDURE — 36415 COLL VENOUS BLD VENIPUNCTURE: CPT

## 2023-04-25 PROCEDURE — 99214 OFFICE O/P EST MOD 30 MIN: CPT | Performed by: NURSE PRACTITIONER

## 2023-04-25 RX ORDER — GABAPENTIN 300 MG/1
300 CAPSULE ORAL 3 TIMES DAILY
Qty: 90 CAPSULE | Refills: 2 | Status: SHIPPED | OUTPATIENT
Start: 2023-04-25 | End: 2023-05-25

## 2023-04-25 ASSESSMENT — ENCOUNTER SYMPTOMS
RESPIRATORY NEGATIVE: 1
BACK PAIN: 1
EYES NEGATIVE: 1
GASTROINTESTINAL NEGATIVE: 1

## 2023-05-18 RX ORDER — BUPROPION HYDROCHLORIDE 150 MG/1
TABLET ORAL
Qty: 30 TABLET | Refills: 5 | Status: SHIPPED | OUTPATIENT
Start: 2023-05-18

## 2023-07-20 DIAGNOSIS — M54.16 LUMBAR BACK PAIN WITH RADICULOPATHY AFFECTING RIGHT LOWER EXTREMITY: ICD-10-CM

## 2023-07-20 RX ORDER — SEMAGLUTIDE 0.68 MG/ML
INJECTION, SOLUTION SUBCUTANEOUS
Qty: 3 ML | Refills: 2 | Status: SHIPPED | OUTPATIENT
Start: 2023-07-20

## 2023-07-20 NOTE — TELEPHONE ENCOUNTER
Refill request from pharmacy for [address] [place] [address].      Sent to PCP for review to verify patient is taking the correct dose of 0.5mg weekly

## 2023-07-21 RX ORDER — GABAPENTIN 300 MG/1
CAPSULE ORAL
Qty: 90 CAPSULE | Refills: 2 | Status: SHIPPED | OUTPATIENT
Start: 2023-07-21 | End: 2023-10-21

## 2023-08-30 ENCOUNTER — OFFICE VISIT (OUTPATIENT)
Dept: FAMILY MEDICINE CLINIC | Age: 63
End: 2023-08-30
Payer: MEDICAID

## 2023-08-30 ENCOUNTER — HOSPITAL ENCOUNTER (OUTPATIENT)
Facility: HOSPITAL | Age: 63
Discharge: HOME | End: 2023-08-30
Payer: MEDICAID

## 2023-08-30 VITALS
RESPIRATION RATE: 18 BRPM | HEIGHT: 69 IN | WEIGHT: 261.4 LBS | SYSTOLIC BLOOD PRESSURE: 136 MMHG | BODY MASS INDEX: 38.72 KG/M2 | HEART RATE: 77 BPM | DIASTOLIC BLOOD PRESSURE: 84 MMHG | TEMPERATURE: 97.1 F | OXYGEN SATURATION: 96 %

## 2023-08-30 DIAGNOSIS — R60.0 PEDAL EDEMA: ICD-10-CM

## 2023-08-30 DIAGNOSIS — M54.16 LUMBAR BACK PAIN WITH RADICULOPATHY AFFECTING RIGHT LOWER EXTREMITY: ICD-10-CM

## 2023-08-30 DIAGNOSIS — E78.2 MIXED HYPERLIPIDEMIA: ICD-10-CM

## 2023-08-30 DIAGNOSIS — I10 ESSENTIAL HYPERTENSION: ICD-10-CM

## 2023-08-30 DIAGNOSIS — E11.8 DIABETES MELLITUS TYPE 2 WITH COMPLICATIONS (HCC): Primary | ICD-10-CM

## 2023-08-30 DIAGNOSIS — E11.8 DIABETES MELLITUS TYPE 2 WITH COMPLICATIONS (HCC): ICD-10-CM

## 2023-08-30 LAB
ALBUMIN SERPL-MCNC: 4.1 G/DL (ref 3.4–4.8)
ALBUMIN/GLOB SERPL: 1.6 {RATIO} (ref 0.8–2)
ALP SERPL-CCNC: 137 U/L (ref 25–100)
ALT SERPL-CCNC: 29 U/L (ref 4–36)
ANION GAP SERPL CALCULATED.3IONS-SCNC: 10 MMOL/L (ref 3–16)
AST SERPL-CCNC: 22 U/L (ref 8–33)
BILIRUB SERPL-MCNC: <0.2 MG/DL (ref 0.3–1.2)
BUN SERPL-MCNC: 19 MG/DL (ref 6–20)
CALCIUM SERPL-MCNC: 9.5 MG/DL (ref 8.5–10.5)
CHLORIDE SERPL-SCNC: 102 MMOL/L (ref 98–107)
CHOLEST SERPL-MCNC: 198 MG/DL (ref 0–200)
CO2 SERPL-SCNC: 27 MMOL/L (ref 20–30)
CREAT SERPL-MCNC: 1 MG/DL (ref 0.4–1.2)
GFR SERPLBLD CREATININE-BSD FMLA CKD-EPI: >60 ML/MIN/{1.73_M2}
GLOBULIN SER CALC-MCNC: 2.6 G/DL
GLUCOSE SERPL-MCNC: 96 MG/DL (ref 74–106)
HBA1C MFR BLD: 6 %
HDLC SERPL-MCNC: 43 MG/DL (ref 40–60)
LDLC SERPL CALC-MCNC: 113 MG/DL
POTASSIUM SERPL-SCNC: 5.2 MMOL/L (ref 3.4–5.1)
PROT SERPL-MCNC: 6.7 G/DL (ref 6.4–8.3)
SODIUM SERPL-SCNC: 139 MMOL/L (ref 136–145)
TRIGL SERPL-MCNC: 212 MG/DL (ref 0–249)
VLDLC SERPL CALC-MCNC: 42 MG/DL

## 2023-08-30 PROCEDURE — G8427 DOCREV CUR MEDS BY ELIG CLIN: HCPCS | Performed by: NURSE PRACTITIONER

## 2023-08-30 PROCEDURE — 80053 COMPREHEN METABOLIC PANEL: CPT

## 2023-08-30 PROCEDURE — 99214 OFFICE O/P EST MOD 30 MIN: CPT | Performed by: NURSE PRACTITIONER

## 2023-08-30 PROCEDURE — 3078F DIAST BP <80 MM HG: CPT | Performed by: NURSE PRACTITIONER

## 2023-08-30 PROCEDURE — 3044F HG A1C LEVEL LT 7.0%: CPT | Performed by: NURSE PRACTITIONER

## 2023-08-30 PROCEDURE — 3074F SYST BP LT 130 MM HG: CPT | Performed by: NURSE PRACTITIONER

## 2023-08-30 PROCEDURE — 80061 LIPID PANEL: CPT

## 2023-08-30 PROCEDURE — 3017F COLORECTAL CA SCREEN DOC REV: CPT | Performed by: NURSE PRACTITIONER

## 2023-08-30 PROCEDURE — 2022F DILAT RTA XM EVC RTNOPTHY: CPT | Performed by: NURSE PRACTITIONER

## 2023-08-30 PROCEDURE — 83036 HEMOGLOBIN GLYCOSYLATED A1C: CPT

## 2023-08-30 PROCEDURE — 1036F TOBACCO NON-USER: CPT | Performed by: NURSE PRACTITIONER

## 2023-08-30 PROCEDURE — G8417 CALC BMI ABV UP PARAM F/U: HCPCS | Performed by: NURSE PRACTITIONER

## 2023-08-30 ASSESSMENT — ENCOUNTER SYMPTOMS
BACK PAIN: 1
EYES NEGATIVE: 1
GASTROINTESTINAL NEGATIVE: 1
RESPIRATORY NEGATIVE: 1

## 2023-08-30 NOTE — PROGRESS NOTES
SUBJECTIVE:  Jessica Wallace is a 63 y.o. male that presents with   Chief Complaint   Patient presents with    Diabetes     Reg f/u    . HPI:    This is a 63year old white male who presents today for follow up with htn, hyperlipidemia anasarca low back pain and diabetes. He tells me he is taking all of his medicines tolerating everything well he does take the gabapentin successfully. He says that his sugar has been okay blood pressures been good he does continue to have pain in the past was in pain management but today says he is doing better since he started taking the gabapentin. I did start him on semaglutide for his diabetes and his obesity and he has tolerated this medicine so at this point I will increase that dose to 0.5. .  I do think this will benefit him we will also get labs on him today. He does not have a past medical history of drug or alcohol abuse denies misuse or abuse of his medication. Hypertension  This is a chronic problem. The current episode started more than 1 year ago. The problem is unchanged. The problem is controlled. Associated symptoms include peripheral edema. There are no associated agents to hypertension. Risk factors for coronary artery disease include dyslipidemia, family history, male gender, obesity, stress and sedentary lifestyle. Past treatments include ACE inhibitors. The current treatment provides significant improvement. Compliance problems include exercise. Hypertensive end-organ damage includes PVD. Back Pain  This is a chronic (sees pain management) problem. The current episode started more than 1 year ago. The problem occurs constantly. The pain is present in the lumbar spine, sacro-iliac, gluteal and thoracic spine. The quality of the pain is described as aching, burning, cramping and stabbing. The pain radiates to the left foot, left knee and left thigh. The pain is at a severity of 6/10. The pain is moderate. The pain is The same all the time.  The symptoms

## 2023-09-12 ENCOUNTER — HOSPITAL ENCOUNTER (OUTPATIENT)
Facility: HOSPITAL | Age: 63
Discharge: HOME | End: 2023-09-12
Payer: MEDICAID

## 2023-09-12 LAB
ANION GAP SERPL CALCULATED.3IONS-SCNC: 13 MMOL/L (ref 3–16)
BUN SERPL-MCNC: 15 MG/DL (ref 6–20)
CALCIUM SERPL-MCNC: 9.5 MG/DL (ref 8.5–10.5)
CHLORIDE SERPL-SCNC: 102 MMOL/L (ref 98–107)
CO2 SERPL-SCNC: 29 MMOL/L (ref 20–30)
CREAT SERPL-MCNC: 1.1 MG/DL (ref 0.4–1.2)
GFR SERPLBLD CREATININE-BSD FMLA CKD-EPI: >60 ML/MIN/{1.73_M2}
GLUCOSE SERPL-MCNC: 121 MG/DL (ref 74–106)
POTASSIUM SERPL-SCNC: 5 MMOL/L (ref 3.4–5.1)
SODIUM SERPL-SCNC: 144 MMOL/L (ref 136–145)

## 2023-09-12 PROCEDURE — 80048 BASIC METABOLIC PNL TOTAL CA: CPT

## 2023-09-13 ENCOUNTER — HOSPITAL ENCOUNTER (OUTPATIENT)
Facility: HOSPITAL | Age: 63
Discharge: HOME | End: 2023-09-13
Payer: MEDICAID

## 2023-09-13 LAB
BACTERIA URNS QL MICRO: ABNORMAL /HPF
BILIRUB UR QL STRIP.AUTO: NEGATIVE
CALCIUM OXALATE CRYSTALS: ABNORMAL /HPF
CLARITY UR: CLEAR
COLOR UR: YELLOW
EPI CELLS #/AREA URNS HPF: ABNORMAL /HPF (ref 0–5)
GLUCOSE UR STRIP.AUTO-MCNC: NEGATIVE MG/DL
HGB UR QL STRIP.AUTO: NEGATIVE
KETONES UR STRIP.AUTO-MCNC: NEGATIVE MG/DL
LEUKOCYTE ESTERASE UR QL STRIP.AUTO: NEGATIVE
MUCOUS THREADS URNS QL MICRO: ABNORMAL /LPF
NITRITE UR QL STRIP.AUTO: NEGATIVE
PH UR STRIP.AUTO: 5.5 [PH] (ref 5–8)
PROT UR STRIP.AUTO-MCNC: NEGATIVE MG/DL
RBC #/AREA URNS HPF: ABNORMAL /HPF (ref 0–4)
SP GR UR STRIP.AUTO: 1.02 (ref 1–1.03)
UA DIPSTICK W REFLEX MICRO PNL UR: ABNORMAL
URN SPEC COLLECT METH UR: ABNORMAL
UROBILINOGEN UR STRIP-ACNC: 0.2 E.U./DL
WBC #/AREA URNS HPF: ABNORMAL /HPF (ref 0–5)

## 2023-09-13 PROCEDURE — 81001 URINALYSIS AUTO W/SCOPE: CPT

## 2023-09-19 DIAGNOSIS — E78.2 MIXED HYPERLIPIDEMIA: Primary | ICD-10-CM

## 2023-09-19 RX ORDER — ROSUVASTATIN CALCIUM 40 MG/1
40 TABLET, COATED ORAL DAILY
Qty: 30 TABLET | Refills: 5 | Status: SHIPPED | OUTPATIENT
Start: 2023-09-19

## 2023-10-24 DIAGNOSIS — M54.16 LUMBAR BACK PAIN WITH RADICULOPATHY AFFECTING RIGHT LOWER EXTREMITY: ICD-10-CM

## 2023-10-24 RX ORDER — GABAPENTIN 300 MG/1
CAPSULE ORAL
Qty: 90 CAPSULE | Refills: 2 | Status: SHIPPED | OUTPATIENT
Start: 2023-10-24 | End: 2024-01-24

## 2023-10-24 NOTE — TELEPHONE ENCOUNTER
Patient called, requested refill.        Next Office Visit Date:  Future Appointments   Date Time Provider [address]   11/30/2023 10:15 AM VITO Leung please review via PDMP

## 2023-11-15 RX ORDER — BUPROPION HYDROCHLORIDE 150 MG/1
TABLET ORAL
Qty: 30 TABLET | Refills: 5 | Status: SHIPPED | OUTPATIENT
Start: 2023-11-15

## 2023-11-15 NOTE — TELEPHONE ENCOUNTER
Patient called, requested refill.        Next Office Visit Date:  Future Appointments   Date Time Provider [address]   11/30/2023 10:15 AM VITO Mcdonald please review via PDMP

## 2023-11-28 DIAGNOSIS — E11.8 DIABETES MELLITUS TYPE 2 WITH COMPLICATIONS (HCC): ICD-10-CM

## 2023-11-28 NOTE — TELEPHONE ENCOUNTER
Patient called, requested refill.        Next Office Visit Date:  Future Appointments   Date Time Provider [address]   11/30/2023 10:15 AM VITO Nielson please review via PDMP

## 2023-11-29 RX ORDER — SEMAGLUTIDE 0.68 MG/ML
0.5 INJECTION, SOLUTION SUBCUTANEOUS WEEKLY
Qty: 3 ML | Refills: 0 | Status: SHIPPED | OUTPATIENT
Start: 2023-11-29

## 2024-02-19 RX ORDER — LACTULOSE 10 G/15ML
SOLUTION ORAL
Qty: 450 ML | Refills: 11 | Status: SHIPPED | OUTPATIENT
Start: 2024-02-19

## 2024-02-19 RX ORDER — BUMETANIDE 2 MG/1
TABLET ORAL
Qty: 30 TABLET | Refills: 11 | Status: SHIPPED | OUTPATIENT
Start: 2024-02-19

## 2024-02-19 RX ORDER — METOPROLOL SUCCINATE 50 MG/1
TABLET, EXTENDED RELEASE ORAL
Qty: 30 TABLET | Refills: 11 | Status: SHIPPED | OUTPATIENT
Start: 2024-02-19

## 2024-02-19 RX ORDER — ATORVASTATIN CALCIUM 20 MG/1
TABLET, FILM COATED ORAL
Qty: 30 TABLET | Refills: 11 | Status: SHIPPED | OUTPATIENT
Start: 2024-02-19

## 2024-02-19 NOTE — TELEPHONE ENCOUNTER
Refill request received from pharmacy      Next Office Visit Date:  Future Appointments   Date Time Provider Department Center   3/18/2024  9:15 AM Sherly Zaldivar APRN [facility] - Please review via PDMP        Last Office Visit:    12/18/2023

## 2024-03-25 ENCOUNTER — CARE COORDINATION (OUTPATIENT)
Dept: PRIMARY CARE CLINIC | Age: 64
End: 2024-03-25

## 2024-03-25 NOTE — CARE COORDINATION
Care Transitions Initial Follow Up Call    Call within 2 business days of discharge: Yes     Patient: Louie Fabian Patient : 1960 MRN: [id number]    Last Discharge Facility       Date Complaint Diagnosis Description Type Department Provider    23   Admission (Discharged) Marisabel Albert DO            RARS: No data recorded     Spoke with: Attempting HFU call, unable to leave message.     Discharge department/facility: [facility]    Non-face-to-face services provided:  Scheduled appointment with PCP-Kayley  Obtained and reviewed discharge summary and/or continuity of care documents    Follow Up  Future Appointments   Date Time Provider Department Center   3/27/2024  9:15 AM Sherly Zaldivar APRN [facility]       Eulalio Moseley RN

## 2024-03-27 ENCOUNTER — OFFICE VISIT (OUTPATIENT)
Dept: FAMILY MEDICINE CLINIC | Age: 64
End: 2024-03-27
Payer: MEDICAID

## 2024-03-27 ENCOUNTER — HOSPITAL ENCOUNTER (OUTPATIENT)
Facility: HOSPITAL | Age: 64
Discharge: HOME | End: 2024-03-27
Payer: MEDICAID

## 2024-03-27 VITALS
HEART RATE: 79 BPM | BODY MASS INDEX: 33.89 KG/M2 | DIASTOLIC BLOOD PRESSURE: 88 MMHG | RESPIRATION RATE: 18 BRPM | SYSTOLIC BLOOD PRESSURE: 146 MMHG | TEMPERATURE: 98.4 F | WEIGHT: 228.8 LBS | HEIGHT: 69 IN | OXYGEN SATURATION: 96 %

## 2024-03-27 DIAGNOSIS — R63.4 WEIGHT LOSS, NON-INTENTIONAL: ICD-10-CM

## 2024-03-27 DIAGNOSIS — M54.16 LUMBAR BACK PAIN WITH RADICULOPATHY AFFECTING RIGHT LOWER EXTREMITY: ICD-10-CM

## 2024-03-27 DIAGNOSIS — R19.7 DIARRHEA, UNSPECIFIED TYPE: Primary | ICD-10-CM

## 2024-03-27 DIAGNOSIS — Z09 HOSPITAL DISCHARGE FOLLOW-UP: ICD-10-CM

## 2024-03-27 DIAGNOSIS — R19.7 DIARRHEA, UNSPECIFIED TYPE: ICD-10-CM

## 2024-03-27 LAB
25(OH)D3 SERPL-MCNC: 29.8 NG/ML (ref 32–100)
ALBUMIN SERPL-MCNC: 4.1 G/DL (ref 3.4–4.8)
ALBUMIN/GLOB SERPL: 1.5 {RATIO} (ref 0.8–2)
ALP SERPL-CCNC: 186 U/L (ref 25–100)
ALT SERPL-CCNC: 68 U/L (ref 4–36)
ANION GAP SERPL CALCULATED.3IONS-SCNC: 13 MMOL/L (ref 3–16)
AST SERPL-CCNC: 37 U/L (ref 8–33)
BASOPHILS # BLD: 0 K/UL (ref 0–0.1)
BASOPHILS NFR BLD: 0.4 %
BILIRUB SERPL-MCNC: 0.5 MG/DL (ref 0.3–1.2)
BUN SERPL-MCNC: 15 MG/DL (ref 6–20)
CALCIUM SERPL-MCNC: 9.6 MG/DL (ref 8.5–10.5)
CHLORIDE SERPL-SCNC: 104 MMOL/L (ref 98–107)
CO2 SERPL-SCNC: 25 MMOL/L (ref 20–30)
CREAT SERPL-MCNC: 0.9 MG/DL (ref 0.4–1.2)
EOSINOPHIL # BLD: 0 K/UL (ref 0–0.4)
EOSINOPHIL NFR BLD: 0.3 %
ERYTHROCYTE [DISTWIDTH] IN BLOOD BY AUTOMATED COUNT: 14.4 % (ref 11–16)
FOLATE SERPL-MCNC: 6.79 NG/ML
GFR SERPLBLD CREATININE-BSD FMLA CKD-EPI: >90 ML/MIN/{1.73_M2}
GLOBULIN SER CALC-MCNC: 2.8 G/DL
GLUCOSE SERPL-MCNC: 116 MG/DL (ref 74–106)
HCT VFR BLD AUTO: 52 % (ref 40–54)
HGB BLD-MCNC: 17 G/DL (ref 13–18)
IMM GRANULOCYTES # BLD: 0 K/UL
IMM GRANULOCYTES NFR BLD: 0.3 % (ref 0–5)
LYMPHOCYTES # BLD: 1.5 K/UL (ref 1.5–4)
LYMPHOCYTES NFR BLD: 21.1 %
MAGNESIUM SERPL-MCNC: 2.2 MG/DL (ref 1.7–2.4)
MCH RBC QN AUTO: 30.7 PG (ref 27–32)
MCHC RBC AUTO-ENTMCNC: 32.7 G/DL (ref 31–35)
MCV RBC AUTO: 93.9 FL (ref 80–100)
MONOCYTES # BLD: 0.8 K/UL (ref 0.2–0.8)
MONOCYTES NFR BLD: 10.8 %
NEUTROPHILS # BLD: 4.9 K/UL (ref 2–7.5)
NEUTS SEG NFR BLD: 67.1 %
PLATELET # BLD AUTO: 295 K/UL (ref 150–400)
PMV BLD AUTO: 10.9 FL (ref 6–10)
POTASSIUM SERPL-SCNC: 4.5 MMOL/L (ref 3.4–5.1)
PROT SERPL-MCNC: 6.9 G/DL (ref 6.4–8.3)
RBC # BLD AUTO: 5.54 M/UL (ref 4.5–6)
SODIUM SERPL-SCNC: 142 MMOL/L (ref 136–145)
TSH SERPL-MCNC: 1.21 UIU/ML (ref 0.27–4.2)
VIT B12 SERPL-MCNC: 481 PG/ML (ref 211–911)
WBC # BLD AUTO: 7.2 K/UL (ref 4–11)

## 2024-03-27 PROCEDURE — G8482 FLU IMMUNIZE ORDER/ADMIN: HCPCS | Performed by: NURSE PRACTITIONER

## 2024-03-27 PROCEDURE — 82306 VITAMIN D 25 HYDROXY: CPT

## 2024-03-27 PROCEDURE — 82607 VITAMIN B-12: CPT

## 2024-03-27 PROCEDURE — 84443 ASSAY THYROID STIM HORMONE: CPT

## 2024-03-27 PROCEDURE — 83735 ASSAY OF MAGNESIUM: CPT

## 2024-03-27 PROCEDURE — 1111F DSCHRG MED/CURRENT MED MERGE: CPT | Performed by: NURSE PRACTITIONER

## 2024-03-27 PROCEDURE — 3017F COLORECTAL CA SCREEN DOC REV: CPT | Performed by: NURSE PRACTITIONER

## 2024-03-27 PROCEDURE — 99214 OFFICE O/P EST MOD 30 MIN: CPT | Performed by: NURSE PRACTITIONER

## 2024-03-27 PROCEDURE — 82746 ASSAY OF FOLIC ACID SERUM: CPT

## 2024-03-27 PROCEDURE — 1036F TOBACCO NON-USER: CPT | Performed by: NURSE PRACTITIONER

## 2024-03-27 PROCEDURE — 80053 COMPREHEN METABOLIC PANEL: CPT

## 2024-03-27 PROCEDURE — 85025 COMPLETE CBC W/AUTO DIFF WBC: CPT

## 2024-03-27 PROCEDURE — G8427 DOCREV CUR MEDS BY ELIG CLIN: HCPCS | Performed by: NURSE PRACTITIONER

## 2024-03-27 PROCEDURE — 36415 COLL VENOUS BLD VENIPUNCTURE: CPT

## 2024-03-27 PROCEDURE — G8417 CALC BMI ABV UP PARAM F/U: HCPCS | Performed by: NURSE PRACTITIONER

## 2024-03-27 RX ORDER — ONDANSETRON 4 MG/1
4 TABLET, ORALLY DISINTEGRATING ORAL EVERY 8 HOURS PRN
COMMUNITY
Start: 2024-03-22 | End: 2024-03-29

## 2024-03-27 RX ORDER — LOPERAMIDE HYDROCHLORIDE 2 MG/1
2 TABLET ORAL 4 TIMES DAILY PRN
COMMUNITY
Start: 2024-03-22 | End: 2024-04-01

## 2024-03-27 RX ORDER — GABAPENTIN 300 MG/1
300 CAPSULE ORAL 3 TIMES DAILY
Qty: 90 CAPSULE | Refills: 2 | Status: SHIPPED | OUTPATIENT
Start: 2024-03-27 | End: 2024-06-27

## 2024-03-27 NOTE — PROGRESS NOTES
Chief Complaint   Patient presents with    Follow-Up from Hospital     Had weakness and passed out.        Have you seen any other physician or provider since your last visit yes - ER    Have you had any other diagnostic tests since your last visit? no    Have you changed or stopped any medications since your last visit? no                
[] ondansetron (ZOFRAN-ODT) 4 MG disintegrating tablet Take 1 tablet by mouth every 8 hours as needed      gabapentin (NEURONTIN) 300 MG capsule Take 1 capsule by mouth in the morning, at noon, and at bedtime for 92 days. 90 capsule 2    metoprolol succinate (TOPROL XL) 50 MG extended release tablet TAKE ONE TABLET BY MOUTH DAILY FOR HEART OR BLOOD PRESSURE 30 tablet 11    atorvastatin (LIPITOR) 20 MG tablet TAKE 1 TABLET BY MOUTH AT BEDTIME FOR CHOLESTEROL 30 tablet 11    bumetanide (BUMEX) 2 MG tablet TAKE ONE TABLET BY MOUTH EVERY MORNING (FLUID PILL) 30 tablet 11    ARIPiprazole (ABILIFY) 5 MG tablet Take 1 tablet by mouth daily 30 tablet 3    fluticasone (FLONASE) 50 MCG/ACT nasal spray 2 SPRAYS BY NASAL ROUTE NIGHTLY AS NEEDED FOR RHINITIS OR ALLERGIES -SHAKE WELL- 16 g 11    pantoprazole (PROTONIX) 40 MG tablet TAKE ONE TABLET BY MOUTH DAILY FOR STOMACH 30 tablet 11    rOPINIRole (REQUIP) 2 MG tablet TAKE 1 TABLET BY MOUTH AT BEDTIME FOR RESTLESS LEGS 30 tablet 11    escitalopram (LEXAPRO) 20 MG tablet TAKE 1 TABLET BY MOUTH DAILY 30 tablet 11    aspirin (ASPIRIN ADULT LOW STRENGTH) 81 MG EC tablet TAKE 1 TABLET BY MOUTH DAILY 30 tablet 11    vitamin D (ERGOCALCIFEROL) 1.25 MG (55120 UT) CAPS capsule TAKE 1 CAPSULE BY MOUTH EVERY WEEK 4 capsule 11    sildenafil (VIAGRA) 100 MG tablet Take 1 tablet by mouth as needed for Erectile Dysfunction 30 tablet 0    brimonidine (ALPHAGAN) 0.2 % ophthalmic solution       latanoprost (XALATAN) 0.005 % ophthalmic solution           Medications patient taking as of now reconciled against medications ordered at time of hospital discharge: Yes    Review of Systems   Constitutional: Negative.    HENT: Negative.     Eyes: Negative.    Respiratory: Negative.     Cardiovascular:  Positive for leg swelling.   Gastrointestinal: Negative.    Endocrine: Negative.    Genitourinary: Negative.    Musculoskeletal:  Positive for arthralgias, back pain and myalgias.   Skin:

## 2024-04-01 RX ORDER — ERGOCALCIFEROL 1.25 MG/1
50000 CAPSULE ORAL WEEKLY
Qty: 4 CAPSULE | Refills: 11 | Status: SHIPPED | OUTPATIENT
Start: 2024-04-01

## 2024-04-09 ENCOUNTER — HOSPITAL ENCOUNTER (OUTPATIENT)
Facility: HOSPITAL | Age: 64
Discharge: HOME | End: 2024-04-09
Payer: MEDICAID

## 2024-04-09 ENCOUNTER — OFFICE VISIT (OUTPATIENT)
Dept: FAMILY MEDICINE CLINIC | Age: 64
End: 2024-04-09
Payer: MEDICAID

## 2024-04-09 VITALS
DIASTOLIC BLOOD PRESSURE: 72 MMHG | HEIGHT: 69 IN | OXYGEN SATURATION: 96 % | HEART RATE: 105 BPM | WEIGHT: 216 LBS | SYSTOLIC BLOOD PRESSURE: 122 MMHG | BODY MASS INDEX: 31.99 KG/M2 | RESPIRATION RATE: 18 BRPM | TEMPERATURE: 97.4 F

## 2024-04-09 DIAGNOSIS — R19.7 DIARRHEA, UNSPECIFIED TYPE: ICD-10-CM

## 2024-04-09 DIAGNOSIS — R60.1 ANASARCA: ICD-10-CM

## 2024-04-09 DIAGNOSIS — R74.8 ELEVATED LIVER ENZYMES: Primary | ICD-10-CM

## 2024-04-09 DIAGNOSIS — R63.0 ANOREXIA: ICD-10-CM

## 2024-04-09 DIAGNOSIS — R33.9 URINARY RETENTION: ICD-10-CM

## 2024-04-09 DIAGNOSIS — R63.4 WEIGHT LOSS, NON-INTENTIONAL: ICD-10-CM

## 2024-04-09 DIAGNOSIS — R74.8 ELEVATED LIVER ENZYMES: ICD-10-CM

## 2024-04-09 LAB
ALBUMIN SERPL-MCNC: 4.2 G/DL (ref 3.4–4.8)
ALBUMIN/GLOB SERPL: 1.6 {RATIO} (ref 0.8–2)
ALP SERPL-CCNC: 170 U/L (ref 25–100)
ALT SERPL-CCNC: 66 U/L (ref 4–36)
ANION GAP SERPL CALCULATED.3IONS-SCNC: 17 MMOL/L (ref 3–16)
AST SERPL-CCNC: 41 U/L (ref 8–33)
BASOPHILS # BLD: 0 K/UL (ref 0–0.1)
BASOPHILS NFR BLD: 0.2 %
BILIRUB SERPL-MCNC: 0.8 MG/DL (ref 0.3–1.2)
BUN SERPL-MCNC: 13 MG/DL (ref 6–20)
CALCIUM SERPL-MCNC: 9.7 MG/DL (ref 8.5–10.5)
CHLORIDE SERPL-SCNC: 98 MMOL/L (ref 98–107)
CO2 SERPL-SCNC: 25 MMOL/L (ref 20–30)
CREAT SERPL-MCNC: 1.1 MG/DL (ref 0.4–1.2)
EOSINOPHIL # BLD: 0 K/UL (ref 0–0.4)
EOSINOPHIL NFR BLD: 0.5 %
ERYTHROCYTE [DISTWIDTH] IN BLOOD BY AUTOMATED COUNT: 14.1 % (ref 11–16)
GFR SERPLBLD CREATININE-BSD FMLA CKD-EPI: 76 ML/MIN/{1.73_M2}
GLOBULIN SER CALC-MCNC: 2.6 G/DL
GLUCOSE SERPL-MCNC: 127 MG/DL (ref 74–106)
HCT VFR BLD AUTO: 51.8 % (ref 40–54)
HGB BLD-MCNC: 17 G/DL (ref 13–18)
IMM GRANULOCYTES # BLD: 0 K/UL
IMM GRANULOCYTES NFR BLD: 0.3 % (ref 0–5)
LYMPHOCYTES # BLD: 1.8 K/UL (ref 1.5–4)
LYMPHOCYTES NFR BLD: 26.8 %
MCH RBC QN AUTO: 30.6 PG (ref 27–32)
MCHC RBC AUTO-ENTMCNC: 32.8 G/DL (ref 31–35)
MCV RBC AUTO: 93.2 FL (ref 80–100)
MONOCYTES # BLD: 0.7 K/UL (ref 0.2–0.8)
MONOCYTES NFR BLD: 11.2 %
NEUTROPHILS # BLD: 4 K/UL (ref 2–7.5)
NEUTS SEG NFR BLD: 61 %
PLATELET # BLD AUTO: 265 K/UL (ref 150–400)
PMV BLD AUTO: 11.3 FL (ref 6–10)
POTASSIUM SERPL-SCNC: 4.2 MMOL/L (ref 3.4–5.1)
PROT SERPL-MCNC: 6.8 G/DL (ref 6.4–8.3)
RBC # BLD AUTO: 5.56 M/UL (ref 4.5–6)
SODIUM SERPL-SCNC: 140 MMOL/L (ref 136–145)
WBC # BLD AUTO: 6.6 K/UL (ref 4–11)

## 2024-04-09 PROCEDURE — 80053 COMPREHEN METABOLIC PANEL: CPT

## 2024-04-09 PROCEDURE — 1036F TOBACCO NON-USER: CPT | Performed by: NURSE PRACTITIONER

## 2024-04-09 PROCEDURE — 85025 COMPLETE CBC W/AUTO DIFF WBC: CPT

## 2024-04-09 PROCEDURE — 99214 OFFICE O/P EST MOD 30 MIN: CPT | Performed by: NURSE PRACTITIONER

## 2024-04-09 PROCEDURE — 3017F COLORECTAL CA SCREEN DOC REV: CPT | Performed by: NURSE PRACTITIONER

## 2024-04-09 PROCEDURE — G8417 CALC BMI ABV UP PARAM F/U: HCPCS | Performed by: NURSE PRACTITIONER

## 2024-04-09 PROCEDURE — G8427 DOCREV CUR MEDS BY ELIG CLIN: HCPCS | Performed by: NURSE PRACTITIONER

## 2024-04-09 SDOH — ECONOMIC STABILITY: FOOD INSECURITY: WITHIN THE PAST 12 MONTHS, YOU WORRIED THAT YOUR FOOD WOULD RUN OUT BEFORE YOU GOT MONEY TO BUY MORE.: NEVER TRUE

## 2024-04-09 SDOH — ECONOMIC STABILITY: HOUSING INSECURITY
IN THE LAST 12 MONTHS, WAS THERE A TIME WHEN YOU DID NOT HAVE A STEADY PLACE TO SLEEP OR SLEPT IN A SHELTER (INCLUDING NOW)?: NO

## 2024-04-09 SDOH — ECONOMIC STABILITY: FOOD INSECURITY: WITHIN THE PAST 12 MONTHS, THE FOOD YOU BOUGHT JUST DIDN'T LAST AND YOU DIDN'T HAVE MONEY TO GET MORE.: NEVER TRUE

## 2024-04-09 SDOH — ECONOMIC STABILITY: INCOME INSECURITY: HOW HARD IS IT FOR YOU TO PAY FOR THE VERY BASICS LIKE FOOD, HOUSING, MEDICAL CARE, AND HEATING?: SOMEWHAT HARD

## 2024-04-09 ASSESSMENT — PATIENT HEALTH QUESTIONNAIRE - PHQ9
2. FEELING DOWN, DEPRESSED OR HOPELESS: SEVERAL DAYS
10. IF YOU CHECKED OFF ANY PROBLEMS, HOW DIFFICULT HAVE THESE PROBLEMS MADE IT FOR YOU TO DO YOUR WORK, TAKE CARE OF THINGS AT HOME, OR GET ALONG WITH OTHER PEOPLE: SOMEWHAT DIFFICULT
8. MOVING OR SPEAKING SO SLOWLY THAT OTHER PEOPLE COULD HAVE NOTICED. OR THE OPPOSITE, BEING SO FIGETY OR RESTLESS THAT YOU HAVE BEEN MOVING AROUND A LOT MORE THAN USUAL: SEVERAL DAYS
3. TROUBLE FALLING OR STAYING ASLEEP: SEVERAL DAYS
SUM OF ALL RESPONSES TO PHQ QUESTIONS 1-9: 7
5. POOR APPETITE OR OVEREATING: SEVERAL DAYS
SUM OF ALL RESPONSES TO PHQ QUESTIONS 1-9: 7
6. FEELING BAD ABOUT YOURSELF - OR THAT YOU ARE A FAILURE OR HAVE LET YOURSELF OR YOUR FAMILY DOWN: NOT AT ALL
7. TROUBLE CONCENTRATING ON THINGS, SUCH AS READING THE NEWSPAPER OR WATCHING TELEVISION: SEVERAL DAYS
SUM OF ALL RESPONSES TO PHQ9 QUESTIONS 1 & 2: 2
9. THOUGHTS THAT YOU WOULD BE BETTER OFF DEAD, OR OF HURTING YOURSELF: NOT AT ALL
4. FEELING TIRED OR HAVING LITTLE ENERGY: SEVERAL DAYS
SUM OF ALL RESPONSES TO PHQ QUESTIONS 1-9: 7
1. LITTLE INTEREST OR PLEASURE IN DOING THINGS: SEVERAL DAYS
SUM OF ALL RESPONSES TO PHQ QUESTIONS 1-9: 7

## 2024-04-09 NOTE — PROGRESS NOTES
Pre-Visit chart review completed. All lab and imaging orders reviewed for outstanding orders and none found. Referrals reviewed and none outstanding. All Health Maintenance requirements reviewed and noted for any that are due at upcoming visit. Any hospital admission documentation, ER documentation or consult notes scanned to chart since last visit have been reviewed and noted for provider to review during upcoming visit.

## 2024-04-09 NOTE — PROGRESS NOTES
SUBJECTIVE:  Louie Fabian is a 63 y.o. male that presents with   Chief Complaint   Patient presents with    Anorexia     No desire to eat    Diarrhea   .    HPI:    This is a 63 year old white male who presents today for follow up with htn, hyperlipidemia anasarca low back pain and diabetes.  He continued to feel very weak without appetite and continues to lose weight does not have vomiting does have some nausea and he has diarrhea after he eats but he is not having diarrhea all the time and the diarrhea is not watery like it wants to be does feel like he is getting better.  He tells me he is taking all of his medicines tolerating everything well he does take the gabapentin successfully.  He says that his sugar has been okay blood pressures been good he does continue to have pain in the past was in pain management but today says he is doing better since he started taking the gabapentin.  He is on Ozempic at 1 mg and I am hoping that is not was causing his problem he does not have a past medical history of drug or alcohol abuse denies misuse or abuse of his medication.    Back Pain  This is a chronic (sees pain management) problem. The current episode started more than 1 year ago. The problem occurs constantly. The pain is present in the lumbar spine, sacro-iliac, gluteal and thoracic spine. The quality of the pain is described as aching, burning, cramping and stabbing. The pain radiates to the left foot, left knee and left thigh. The pain is at a severity of 6/10. The pain is moderate. The pain is The same all the time. The symptoms are aggravated by bending, position and twisting. Stiffness is present All day, at night and in the morning. Associated symptoms include leg pain and paresis. Risk factors include lack of exercise and sedentary lifestyle. He has tried NSAIDs, home exercises, heat and analgesics for the symptoms. The treatment provided mild relief.   Hyperlipidemia  This is a chronic problem. The current

## 2024-04-10 RX ORDER — ESCITALOPRAM OXALATE 20 MG/1
20 TABLET ORAL DAILY
Qty: 30 TABLET | Refills: 11 | Status: SHIPPED | OUTPATIENT
Start: 2024-04-10

## 2024-04-10 RX ORDER — FLUTICASONE PROPIONATE 50 MCG
SPRAY, SUSPENSION (ML) NASAL
Qty: 16 G | Refills: 11 | Status: SHIPPED | OUTPATIENT
Start: 2024-04-10

## 2024-04-10 RX ORDER — ASPIRIN 81 MG/1
TABLET ORAL
Qty: 30 TABLET | Refills: 11 | Status: SHIPPED | OUTPATIENT
Start: 2024-04-10

## 2024-04-10 RX ORDER — ROPINIROLE 2 MG/1
TABLET, FILM COATED ORAL
Qty: 30 TABLET | Refills: 11 | Status: SHIPPED | OUTPATIENT
Start: 2024-04-10

## 2024-04-10 RX ORDER — PANTOPRAZOLE SODIUM 40 MG/1
TABLET, DELAYED RELEASE ORAL
Qty: 30 TABLET | Refills: 11 | Status: SHIPPED | OUTPATIENT
Start: 2024-04-10

## 2024-04-10 NOTE — TELEPHONE ENCOUNTER
Refill request received from pharmacy      Next Office Visit Date:  No future appointments.    OSKAR - Please review via PDMP        Last Office Visit:    4/9/2024

## 2024-05-01 DIAGNOSIS — R74.8 ELEVATED LIVER ENZYMES: ICD-10-CM

## 2024-05-09 RX ORDER — MIRTAZAPINE 30 MG/1
30 TABLET, FILM COATED ORAL NIGHTLY
Qty: 30 TABLET | Refills: 5 | Status: SHIPPED | OUTPATIENT
Start: 2024-05-09